# Patient Record
Sex: MALE | Race: BLACK OR AFRICAN AMERICAN | NOT HISPANIC OR LATINO | URBAN - METROPOLITAN AREA
[De-identification: names, ages, dates, MRNs, and addresses within clinical notes are randomized per-mention and may not be internally consistent; named-entity substitution may affect disease eponyms.]

---

## 2021-10-26 ENCOUNTER — INPATIENT (INPATIENT)
Facility: HOSPITAL | Age: 66
LOS: 7 days | Discharge: HOME HEALTH | End: 2021-11-03
Attending: INTERNAL MEDICINE | Admitting: INTERNAL MEDICINE
Payer: MEDICARE

## 2021-10-26 VITALS
DIASTOLIC BLOOD PRESSURE: 82 MMHG | OXYGEN SATURATION: 100 % | SYSTOLIC BLOOD PRESSURE: 197 MMHG | TEMPERATURE: 98 F | WEIGHT: 199.96 LBS | HEART RATE: 83 BPM | RESPIRATION RATE: 16 BRPM | HEIGHT: 70 IN

## 2021-10-26 LAB
ALBUMIN SERPL ELPH-MCNC: 2.5 G/DL — LOW (ref 3.3–5)
ALP SERPL-CCNC: 92 U/L — SIGNIFICANT CHANGE UP (ref 40–120)
ALT FLD-CCNC: 7 U/L — LOW (ref 12–78)
ANION GAP SERPL CALC-SCNC: 19 MMOL/L — HIGH (ref 5–17)
AST SERPL-CCNC: 18 U/L — SIGNIFICANT CHANGE UP (ref 15–37)
BASOPHILS # BLD AUTO: 0.06 K/UL — SIGNIFICANT CHANGE UP (ref 0–0.2)
BASOPHILS NFR BLD AUTO: 0.9 % — SIGNIFICANT CHANGE UP (ref 0–2)
BILIRUB SERPL-MCNC: 0.3 MG/DL — SIGNIFICANT CHANGE UP (ref 0.2–1.2)
BUN SERPL-MCNC: 88 MG/DL — HIGH (ref 7–23)
CALCIUM SERPL-MCNC: 12.1 MG/DL — HIGH (ref 8.5–10.1)
CHLORIDE SERPL-SCNC: 98 MMOL/L — SIGNIFICANT CHANGE UP (ref 96–108)
CO2 SERPL-SCNC: 14 MMOL/L — LOW (ref 22–31)
CREAT SERPL-MCNC: 9.72 MG/DL — HIGH (ref 0.5–1.3)
EOSINOPHIL # BLD AUTO: 0.35 K/UL — SIGNIFICANT CHANGE UP (ref 0–0.5)
EOSINOPHIL NFR BLD AUTO: 5 % — SIGNIFICANT CHANGE UP (ref 0–6)
GLUCOSE SERPL-MCNC: 108 MG/DL — HIGH (ref 70–99)
HCT VFR BLD CALC: 24.1 % — LOW (ref 39–50)
HGB BLD-MCNC: 7.5 G/DL — LOW (ref 13–17)
IMM GRANULOCYTES NFR BLD AUTO: 0.4 % — SIGNIFICANT CHANGE UP (ref 0–1.5)
LACTATE SERPL-SCNC: 3.5 MMOL/L — HIGH (ref 0.7–2)
LYMPHOCYTES # BLD AUTO: 0.9 K/UL — LOW (ref 1–3.3)
LYMPHOCYTES # BLD AUTO: 12.9 % — LOW (ref 13–44)
MAGNESIUM SERPL-MCNC: 2.4 MG/DL — SIGNIFICANT CHANGE UP (ref 1.6–2.6)
MCHC RBC-ENTMCNC: 26 PG — LOW (ref 27–34)
MCHC RBC-ENTMCNC: 31.1 GM/DL — LOW (ref 32–36)
MCV RBC AUTO: 83.4 FL — SIGNIFICANT CHANGE UP (ref 80–100)
MONOCYTES # BLD AUTO: 0.43 K/UL — SIGNIFICANT CHANGE UP (ref 0–0.9)
MONOCYTES NFR BLD AUTO: 6.2 % — SIGNIFICANT CHANGE UP (ref 2–14)
NEUTROPHILS # BLD AUTO: 5.21 K/UL — SIGNIFICANT CHANGE UP (ref 1.8–7.4)
NEUTROPHILS NFR BLD AUTO: 74.6 % — SIGNIFICANT CHANGE UP (ref 43–77)
NRBC # BLD: 0 /100 WBCS — SIGNIFICANT CHANGE UP (ref 0–0)
PLATELET # BLD AUTO: 527 K/UL — HIGH (ref 150–400)
POTASSIUM SERPL-MCNC: 4.8 MMOL/L — SIGNIFICANT CHANGE UP (ref 3.5–5.3)
POTASSIUM SERPL-SCNC: 4.8 MMOL/L — SIGNIFICANT CHANGE UP (ref 3.5–5.3)
PROT SERPL-MCNC: 8.1 GM/DL — SIGNIFICANT CHANGE UP (ref 6–8.3)
RBC # BLD: 2.89 M/UL — LOW (ref 4.2–5.8)
RBC # FLD: 17.2 % — HIGH (ref 10.3–14.5)
SODIUM SERPL-SCNC: 131 MMOL/L — LOW (ref 135–145)
WBC # BLD: 6.98 K/UL — SIGNIFICANT CHANGE UP (ref 3.8–10.5)
WBC # FLD AUTO: 6.98 K/UL — SIGNIFICANT CHANGE UP (ref 3.8–10.5)

## 2021-10-26 PROCEDURE — 99285 EMERGENCY DEPT VISIT HI MDM: CPT

## 2021-10-26 RX ORDER — ACETAMINOPHEN 500 MG
975 TABLET ORAL ONCE
Refills: 0 | Status: COMPLETED | OUTPATIENT
Start: 2021-10-26 | End: 2021-10-26

## 2021-10-26 RX ORDER — SODIUM CHLORIDE 9 MG/ML
2000 INJECTION INTRAMUSCULAR; INTRAVENOUS; SUBCUTANEOUS ONCE
Refills: 0 | Status: COMPLETED | OUTPATIENT
Start: 2021-10-26 | End: 2021-10-26

## 2021-10-26 RX ORDER — HYDRALAZINE HCL 50 MG
10 TABLET ORAL ONCE
Refills: 0 | Status: COMPLETED | OUTPATIENT
Start: 2021-10-26 | End: 2021-10-26

## 2021-10-26 RX ADMIN — SODIUM CHLORIDE 2000 MILLILITER(S): 9 INJECTION INTRAMUSCULAR; INTRAVENOUS; SUBCUTANEOUS at 23:34

## 2021-10-26 RX ADMIN — Medication 10 MILLIGRAM(S): at 23:33

## 2021-10-26 NOTE — ED ADULT NURSE NOTE - OBJECTIVE STATEMENT
Patient received at bed E. Patient A&Ox4. Patient in no acute distress. Patient denies pain and discomfort, patient reports that he Patient received at bed E. Patient A&Ox4. Patient in no acute distress. Patient denies pain and discomfort, patient reports that he w Patient received at bed E. Patient A&Ox4. Patient in no acute distress. Patient denies pain and discomfort, patient reports that he had blood work done this week and was sent to the PMD. Patient reports that the PMD is concerned about GWEN, patient reports that his stream is diminished, but denies burning urination. Patient denies abdominal pain, n/v/d, headache and dizziness. Patient denies chest pain and sob, patient in no acute distress. Patient received at bed E. Patient A&Ox4. Patient in no acute distress. Patient denies pain and discomfort, patient reports that he had blood work done this week and was sent to the PMD. Patient reports that the PMD is concerned about GWEN, patient reports that his stream is diminished, but denies burning urination. Patient denies abdominal pain, n/v/d, headache and dizziness. Patient denies chest pain and sob, patient in no acute distress. Patient has swelling of the right hand and foot with open wound to the right great toe- with green and whitish pus coming out. Patient denies PMH and taking medications daily.

## 2021-10-26 NOTE — ED ADULT TRIAGE NOTE - CHIEF COMPLAINT QUOTE
BIBA as per EMS pt went to urgent care on saturday for R foot and R hand infection, notified today regarding abnormal blood work indicating early kidney failure. Reports no decrease in urine output, denies dysuria.

## 2021-10-27 DIAGNOSIS — N28.89 OTHER SPECIFIED DISORDERS OF KIDNEY AND URETER: ICD-10-CM

## 2021-10-27 DIAGNOSIS — M06.9 RHEUMATOID ARTHRITIS, UNSPECIFIED: ICD-10-CM

## 2021-10-27 DIAGNOSIS — N17.9 ACUTE KIDNEY FAILURE, UNSPECIFIED: ICD-10-CM

## 2021-10-27 LAB
ALBUMIN SERPL ELPH-MCNC: 2.1 G/DL — LOW (ref 3.3–5)
ALP SERPL-CCNC: 75 U/L — SIGNIFICANT CHANGE UP (ref 40–120)
ALT FLD-CCNC: 8 U/L — LOW (ref 12–78)
ANION GAP SERPL CALC-SCNC: 18 MMOL/L — HIGH (ref 5–17)
APPEARANCE UR: CLEAR — SIGNIFICANT CHANGE UP
AST SERPL-CCNC: 15 U/L — SIGNIFICANT CHANGE UP (ref 15–37)
BACTERIA # UR AUTO: ABNORMAL
BILIRUB SERPL-MCNC: 0.3 MG/DL — SIGNIFICANT CHANGE UP (ref 0.2–1.2)
BILIRUB UR-MCNC: NEGATIVE — SIGNIFICANT CHANGE UP
BLD GP AB SCN SERPL QL: SIGNIFICANT CHANGE UP
BUN SERPL-MCNC: 86 MG/DL — HIGH (ref 7–23)
CALCIUM SERPL-MCNC: 10.9 MG/DL — HIGH (ref 8.5–10.1)
CHLORIDE SERPL-SCNC: 101 MMOL/L — SIGNIFICANT CHANGE UP (ref 96–108)
CHOLEST SERPL-MCNC: 117 MG/DL — SIGNIFICANT CHANGE UP
CO2 SERPL-SCNC: 13 MMOL/L — LOW (ref 22–31)
COLOR SPEC: YELLOW — SIGNIFICANT CHANGE UP
CREAT SERPL-MCNC: 9.11 MG/DL — HIGH (ref 0.5–1.3)
CRP SERPL-MCNC: 75 MG/L — HIGH
DIFF PNL FLD: ABNORMAL
EPI CELLS # UR: SIGNIFICANT CHANGE UP
ERYTHROCYTE [SEDIMENTATION RATE] IN BLOOD: 91 MM/HR — HIGH (ref 0–20)
GLUCOSE SERPL-MCNC: 70 MG/DL — SIGNIFICANT CHANGE UP (ref 70–99)
GLUCOSE UR QL: NEGATIVE MG/DL — SIGNIFICANT CHANGE UP
GRAM STN FLD: SIGNIFICANT CHANGE UP
HDLC SERPL-MCNC: 28 MG/DL — LOW
KETONES UR-MCNC: NEGATIVE — SIGNIFICANT CHANGE UP
LACTATE SERPL-SCNC: 2 MMOL/L — SIGNIFICANT CHANGE UP (ref 0.7–2)
LEUKOCYTE ESTERASE UR-ACNC: NEGATIVE — SIGNIFICANT CHANGE UP
LIPID PNL WITH DIRECT LDL SERPL: 72 MG/DL — SIGNIFICANT CHANGE UP
NITRITE UR-MCNC: NEGATIVE — SIGNIFICANT CHANGE UP
NON HDL CHOLESTEROL: 88 MG/DL — SIGNIFICANT CHANGE UP
PH UR: 6 — SIGNIFICANT CHANGE UP (ref 5–8)
POTASSIUM SERPL-MCNC: 5 MMOL/L — SIGNIFICANT CHANGE UP (ref 3.5–5.3)
POTASSIUM SERPL-SCNC: 5 MMOL/L — SIGNIFICANT CHANGE UP (ref 3.5–5.3)
PROT SERPL-MCNC: 6.7 GM/DL — SIGNIFICANT CHANGE UP (ref 6–8.3)
PROT UR-MCNC: 30 MG/DL
RAPID RVP RESULT: SIGNIFICANT CHANGE UP
RBC CASTS # UR COMP ASSIST: SIGNIFICANT CHANGE UP /HPF (ref 0–4)
RHEUMATOID FACT SERPL-ACNC: <10 IU/ML — SIGNIFICANT CHANGE UP (ref 0–13)
SARS-COV-2 RNA SPEC QL NAA+PROBE: SIGNIFICANT CHANGE UP
SODIUM SERPL-SCNC: 132 MMOL/L — LOW (ref 135–145)
SP GR SPEC: 1.01 — SIGNIFICANT CHANGE UP (ref 1.01–1.02)
SPECIMEN SOURCE: SIGNIFICANT CHANGE UP
TRIGL SERPL-MCNC: 81 MG/DL — SIGNIFICANT CHANGE UP
URATE SERPL-MCNC: 11.4 MG/DL — HIGH (ref 3.4–8.8)
URATE SERPL-MCNC: 11.4 MG/DL — HIGH (ref 3.4–8.8)
UROBILINOGEN FLD QL: NEGATIVE MG/DL — SIGNIFICANT CHANGE UP
WBC UR QL: SIGNIFICANT CHANGE UP

## 2021-10-27 PROCEDURE — 76775 US EXAM ABDO BACK WALL LIM: CPT | Mod: 26

## 2021-10-27 PROCEDURE — 99222 1ST HOSP IP/OBS MODERATE 55: CPT

## 2021-10-27 PROCEDURE — 12345: CPT | Mod: NC

## 2021-10-27 PROCEDURE — 73130 X-RAY EXAM OF HAND: CPT | Mod: 26,RT

## 2021-10-27 PROCEDURE — 88304 TISSUE EXAM BY PATHOLOGIST: CPT | Mod: 26

## 2021-10-27 PROCEDURE — 73630 X-RAY EXAM OF FOOT: CPT | Mod: 26,RT

## 2021-10-27 PROCEDURE — 73718 MRI LOWER EXTREMITY W/O DYE: CPT | Mod: 26,RT

## 2021-10-27 RX ORDER — HYDRALAZINE HCL 50 MG
10 TABLET ORAL ONCE
Refills: 0 | Status: COMPLETED | OUTPATIENT
Start: 2021-10-27 | End: 2021-10-27

## 2021-10-27 RX ORDER — HEPARIN SODIUM 5000 [USP'U]/ML
5000 INJECTION INTRAVENOUS; SUBCUTANEOUS EVERY 8 HOURS
Refills: 0 | Status: DISCONTINUED | OUTPATIENT
Start: 2021-10-27 | End: 2021-10-28

## 2021-10-27 RX ORDER — INFLUENZA VIRUS VACCINE 15; 15; 15; 15 UG/.5ML; UG/.5ML; UG/.5ML; UG/.5ML
0.5 SUSPENSION INTRAMUSCULAR ONCE
Refills: 0 | Status: DISCONTINUED | OUTPATIENT
Start: 2021-10-27 | End: 2021-11-03

## 2021-10-27 RX ORDER — TRAMADOL HYDROCHLORIDE 50 MG/1
50 TABLET ORAL EVERY 6 HOURS
Refills: 0 | Status: DISCONTINUED | OUTPATIENT
Start: 2021-10-27 | End: 2021-10-28

## 2021-10-27 RX ORDER — CEFEPIME 1 G/1
500 INJECTION, POWDER, FOR SOLUTION INTRAMUSCULAR; INTRAVENOUS DAILY
Refills: 0 | Status: DISCONTINUED | OUTPATIENT
Start: 2021-10-27 | End: 2021-10-29

## 2021-10-27 RX ORDER — VANCOMYCIN HCL 1 G
1000 VIAL (EA) INTRAVENOUS ONCE
Refills: 0 | Status: COMPLETED | OUTPATIENT
Start: 2021-10-27 | End: 2021-10-27

## 2021-10-27 RX ORDER — SODIUM CHLORIDE 9 MG/ML
1000 INJECTION, SOLUTION INTRAVENOUS
Refills: 0 | Status: COMPLETED | OUTPATIENT
Start: 2021-10-27 | End: 2021-10-27

## 2021-10-27 RX ORDER — PIPERACILLIN AND TAZOBACTAM 4; .5 G/20ML; G/20ML
3.38 INJECTION, POWDER, LYOPHILIZED, FOR SOLUTION INTRAVENOUS ONCE
Refills: 0 | Status: COMPLETED | OUTPATIENT
Start: 2021-10-27 | End: 2021-10-27

## 2021-10-27 RX ADMIN — Medication 10 MILLIGRAM(S): at 05:09

## 2021-10-27 RX ADMIN — PIPERACILLIN AND TAZOBACTAM 3.38 GRAM(S): 4; .5 INJECTION, POWDER, LYOPHILIZED, FOR SOLUTION INTRAVENOUS at 05:39

## 2021-10-27 RX ADMIN — HEPARIN SODIUM 5000 UNIT(S): 5000 INJECTION INTRAVENOUS; SUBCUTANEOUS at 21:34

## 2021-10-27 RX ADMIN — PIPERACILLIN AND TAZOBACTAM 200 GRAM(S): 4; .5 INJECTION, POWDER, LYOPHILIZED, FOR SOLUTION INTRAVENOUS at 05:09

## 2021-10-27 RX ADMIN — Medication 250 MILLIGRAM(S): at 12:50

## 2021-10-27 RX ADMIN — CEFEPIME 100 MILLIGRAM(S): 1 INJECTION, POWDER, FOR SOLUTION INTRAMUSCULAR; INTRAVENOUS at 16:01

## 2021-10-27 RX ADMIN — TRAMADOL HYDROCHLORIDE 50 MILLIGRAM(S): 50 TABLET ORAL at 12:21

## 2021-10-27 RX ADMIN — SODIUM CHLORIDE 100 MILLILITER(S): 9 INJECTION, SOLUTION INTRAVENOUS at 07:53

## 2021-10-27 RX ADMIN — SODIUM CHLORIDE 100 MILLILITER(S): 9 INJECTION, SOLUTION INTRAVENOUS at 19:54

## 2021-10-27 NOTE — H&P ADULT - ASSESSMENT
Patient is a 66M with no reported PMH who was sent to the ED for abnormal labs.  Patient went to an urgent care as he was concerned of an infection to his R hand and R foot.  Was given PO antibiotics there, was later called and instructed to goto the emergency room for elevated creatinine.  Denies history of known kidney disease, states that he urinates multiple times per day.  Patient reports chronic deformity and swelling of his fingers, has not seen a physician for his condition.  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  Lives alone.  NKDA, does not take daily medications.  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  Patient has no other complaints.  Hypertensive in ED to 197/82, labs show significant creatinine elevation.  Will admit to med surg.

## 2021-10-27 NOTE — ED PROVIDER NOTE - PHYSICAL EXAMINATION
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

## 2021-10-27 NOTE — H&P ADULT - PROBLEM SELECTOR PLAN 1
patient likely has significant renal failure due to low BUN:Cr ratio, signs of AOCD, elevated calcium.  Will hydrate, trend labs.  Renal eval in  - Kristine

## 2021-10-27 NOTE — ED PROVIDER NOTE - CARE PLAN
1 Principal Discharge DX:	Acute renal failure  Secondary Diagnosis:	Tophaceous gout  Secondary Diagnosis:	Cellulitis of hand, right  Secondary Diagnosis:	Cellulitis of right foot   Principal Discharge DX:	Acute renal failure  Secondary Diagnosis:	Tophaceous gout  Secondary Diagnosis:	Cellulitis of hand, right  Secondary Diagnosis:	Cellulitis of right foot  Secondary Diagnosis:	Hypertensive emergency

## 2021-10-27 NOTE — CONSULT NOTE ADULT - SUBJECTIVE AND OBJECTIVE BOX
Patient chart reviewed, full consult to follow.     Thank you for the courtesy of this consultation. [facility] NEPHROLOGY [facility], [facility]  NEPHROLOGY AND HYPERTENSION  [address]  [address]  [place] [address]  [phone]    MD ELLI WHITTAKER MD ANDREY GONCHARUK, MD MADHU KORRAPATI, MD YELENA ROSENBERG, MD BINNY KOSHY, MD CHRISTOPHER CAPUTO, MD EDWARD BOVER, MD      Information from chart:  "Patient is a 66y old  Male who presents with a chief complaint of renal failure, hand/foot swelling (27 Oct 2021 18:41)    HPI:  Patient is a 66M with no reported PMH who was sent to the ED for abnormal labs.  Patient went to an urgent care as he was concerned of an infection to his R hand and R foot.  Was given PO antibiotics there, was later called and instructed to goto the emergency room for elevated creatinine.  Denies history of known kidney disease, states that he urinates multiple times per day.  Patient reports chronic deformity and swelling of his fingers, has not seen a physician for his condition.  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  NKDA, does not take daily medications.  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  Patient has no other complaints.  Hypertensive in ED to 197/82, labs show significant creatinine elevation.  Will admit to med surg.  (27 Oct 2021 05:01)   "    Patient has not been under the care of a physician for 6 years; no recent blood work  Noted Tophi in bilateral hands  Cr 9;   No over constitutional sx   No cough; hemoptysis   Noted hypercalcemia and hyperuricemia     PAST MEDICAL & SURGICAL HISTORY:  No pertinent past medical history      FAMILY HISTORY:    Allergies    No Known Allergies    Intolerances      Home Medications:    MEDICATIONS  (STANDING):  cefepime   IVPB 500 milliGRAM(s) IV Intermittent daily  heparin   Injectable 5000 Unit(s) SubCutaneous every 8 hours  influenza   Vaccine 0.5 milliLiter(s) IntraMuscular once    MEDICATIONS  (PRN):  traMADol 50 milliGRAM(s) Oral every 6 hours PRN Moderate Pain (4 - 6)    Vital Signs Last 24 Hrs  T(C): 36.7 (27 Oct 2021 17:24), Max: 36.7 (27 Oct 2021 09:58)  T(F): 98 (27 Oct 2021 17:24), Max: 98 (27 Oct 2021 09:58)  HR: 67 (27 Oct 2021 17:24) (67 - 79)  BP: 120/73 (27 Oct 2021 17:24) (120/73 - 160/76)  BP(mean): --  RR: 17 (27 Oct 2021 17:24) (17 - 18)  SpO2: 100% (27 Oct 2021 17:24) (99% - 100%)    Daily     Daily     10-27-21 @ 07:01  -  10-27-21 @ 22:44  --------------------------------------------------------  IN: 720 mL / OUT: 0 mL / NET: 720 mL      CAPILLARY BLOOD GLUCOSE        PHYSICAL EXAM:      T(C): 36.7 (10-27-21 @ 17:24), Max: 36.7 (10-27-21 @ 09:58)  HR: 67 (10-27-21 @ 17:24) (67 - 79)  BP: 120/73 (10-27-21 @ 17:24) (120/73 - 160/76)  RR: 17 (10-27-21 @ 17:24) (17 - 18)  SpO2: 100% (10-27-21 @ 17:24) (99% - 100%)  Wt(kg): --  Lungs clear  Heart S1S2  Abd soft NT ND  Extremities:   tr edema              10-27    132<L>  |  101  |  86<H>  ----------------------------<  70  5.0   |  13<L>  |  9.11<H>    Ca    10.9<H>      27 Oct 2021 05:27  Mg     2.4     10-26    TPro  6.7  /  Alb  2.1<L>  /  TBili  0.3  /  DBili  x   /  AST  15  /  ALT  8<L>  /  AlkPhos  75  10-27                          7.5    6.98  )-----------( 527      ( 26 Oct 2021 22:16 )             24.1     Creatinine Trend: 9.11<--, 9.72<--  Urinalysis Basic - ( 27 Oct 2021 03:00 )    Color: Yellow / Appearance: Clear / S.010 / pH: x  Gluc: x / Ketone: Negative  / Bili: Negative / Urobili: Negative mg/dL   Blood: x / Protein: 30 mg/dL / Nitrite: Negative   Leuk Esterase: Negative / RBC: 0-2 /HPF / WBC 3-5   Sq Epi: x / Non Sq Epi: Few / Bacteria: Occasional            Assessment   GWEN degree of CKD unclear, associated with tophaceous gout, anemia and hypercalcemia   r/o Urate nephropathy; paraprotein related disease; occult GN    Plan  Paraprotein screen; serologies;   Pb level  Pending course, will arrange for renal bx Friday;   IVF challenge;   Allopurinol   Rheum evaluation     Marcel Carmona MD

## 2021-10-27 NOTE — H&P ADULT - NSHPLABSRESULTS_GEN_ALL_CORE
Recent Vitals  T(C): 36.6 (10-26-21 @ 21:35), Max: 36.6 (10-26-21 @ 21:35)  HR: 83 (10-26-21 @ 21:35) (83 - 83)  BP: 197/82 (10-26-21 @ 21:35) (197/82 - 197/82)  RR: 16 (10-26-21 @ 21:35) (16 - 16)  SpO2: 100% (10-26-21 @ 21:35) (100% - 100%)                        7.5    6.98  )-----------( 527      ( 26 Oct 2021 22:16 )             24.1     10-26    131<L>  |  98  |  88<H>  ----------------------------<  108<H>  4.8   |  14<L>  |  9.72<H>    Ca    12.1<H>      26 Oct 2021 22:16  Mg     2.4     10-26    TPro  8.1  /  Alb  2.5<L>  /  TBili  0.3  /  DBili  x   /  AST  18  /  ALT  7<L>  /  AlkPhos  92  10-26      LIVER FUNCTIONS - ( 26 Oct 2021 22:16 )  Alb: 2.5 g/dL / Pro: 8.1 gm/dL / ALK PHOS: 92 U/L / ALT: 7 U/L / AST: 18 U/L / GGT: x           Urinalysis Basic - ( 27 Oct 2021 03:00 )    Color: Yellow / Appearance: Clear / S.010 / pH: x  Gluc: x / Ketone: Negative  / Bili: Negative / Urobili: Negative mg/dL   Blood: x / Protein: 30 mg/dL / Nitrite: Negative   Leuk Esterase: Negative / RBC: 0-2 /HPF / WBC 3-5   Sq Epi: x / Non Sq Epi: Few / Bacteria: Occasional        Home Medications:

## 2021-10-27 NOTE — H&P ADULT - NSHPREVIEWOFSYSTEMS_GEN_ALL_CORE
Constitutional: no fever, chills, night sweats  Ears: no hearing changes or ear pain,   Nose: no nasal congestion, sinus pain, or rhinorrhea  Cardio: no chest pain, orthopnea, edema, or palpitations  Resp: no dyspnea, cough, wheezing  GI: no nausea, vomiting, diarrhea, constipation, hematochezia, or melena  : no dysuria, urinary frequency, hematuria  MSK: significant pain to hands/feet  Skin: ulcerations to hands/feet   Neuro: no weakness, dizziness, lightheadedness, syncope   Heme/Lymph: no bruising or bleeding

## 2021-10-27 NOTE — CONSULT NOTE ADULT - ASSESSMENT
66M with R foot swelling and wounds   - Pt assessed and evaluated bedside in ED  - Afebrile, no leukocytosis, VSS, ESR 91, CRP pending   - Right foot X ray resident read: obliteration and fragmentation of proximal phalanx heads of digits 1-5 seen, fabi's sign to the R medial border of the first MT, cortical erosions at the base of the distal phalanx hallux, and 2nd digit - possible OM, no signs of subcutaneous emphysema   - Exam: R foot hallux wound to medial aspect of IPJ with probe to bone with fibrous wound bed, no mal odor or erythema, 3cc of tophi expressed from wound, no tracking of wound, R foot 2nd digit with dusky changes, fluctulance noted along dorsal aspect with tense bullae along dorsal aspect of digit, wounds on medial and lateral aspect of digit at the level of the IPJ with tophi, wounds probe to bone, erythema to the digit, no malodor. Left foot wound to medial IPJ of hallux with probe to subQ, no signs of infection, 2nd digit dorsal wound to dermis with no signs of infection.   - After prepping the foot in a sterile manner, 20cc of 1% lidocaine was used to administer an ankle block to the R ankle, then using a sterile #15 blade an incision was made to dorsal aspect of the 2nd digit to the level of subQ and not beyond, about 2cc of tophi expressed along with 1cc of pus with no mal odor, wound was then flushed with 100cc of NS  - Pt tolerated procedure well   - Foot dressed with betadine and DSD  - Agree with admit to medicine   - Recommend IV vancomycin and cefepime   - Recommend toradol for possible gout  - Wound culture taken and tophi sent to pathology   - Pending uric acid results   - RF MR ordered without contrast   - Podiatry plan is local wound care vs surgery pending RF MR and culture results  - Cannot rule out OM as wounds probe to bone   - Discussed with attending  66M with R foot swelling and wounds   - Pt assessed and evaluated bedside in ED  - Afebrile, no leukocytosis, VSS, ESR 91, CRP pending   - Right foot X ray resident read: obliteration and fragmentation of proximal phalanx heads of digits 1-5 seen, fabi's sign to the R medial border of the first MT, cortical erosions at the base of the distal phalanx hallux, and 2nd digit - possible OM, no signs of subcutaneous emphysema   - Exam: R foot hallux wound to medial aspect of IPJ with probe to bone with fibrous wound bed, no mal odor or erythema, 3cc of tophi expressed from wound, no tracking of wound, R foot 2nd digit with dusky changes, fluctulance noted along dorsal aspect with tense bullae along dorsal aspect of digit, wounds on medial and lateral aspect of digit at the level of the IPJ with tophi, wounds probe to bone, erythema to the digit, no malodor. Left foot wound to medial IPJ of hallux with probe to subQ, no signs of infection, 2nd digit dorsal wound to dermis with no signs of infection.   - After prepping the foot in a sterile manner, 20cc of 1% lidocaine was used to administer an ankle block to the R ankle, then using a sterile #15 blade an incision was made to dorsal aspect of the 2nd digit to the level of subQ and not beyond, about 2cc of tophi expressed along with 1cc of pus with no mal odor, wound was then flushed with 100cc of NS  - Pt tolerated procedure well   - Foot dressed with betadine and DSD  - Agree with admit to medicine   - Recommend IV vancomycin and cefepime   - Recommend toradol and colchicine in renal dose for gout   - Wound culture taken and tophi sent to pathology   - Pending uric acid results   - RF MR ordered without contrast   - Podiatry plan is local wound care vs surgery pending RF MR and culture results  - Cannot rule out OM as wounds probe to bone   - Discussed with attending

## 2021-10-27 NOTE — H&P ADULT - HISTORY OF PRESENT ILLNESS
Patient is a 66M with no reported PMH who was sent to the ED for abnormal labs.  Patient went to an urgent care as he was concerned of an infection to his R hand and R foot.  Was given PO antibiotics there, was later called and instructed to goto the emergency room for elevated creatinine.  Denies history of known kidney disease, states that he urinates multiple times per day.  Patient reports chronic deformity and swelling of his fingers, has not seen a physician for his condition.  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  NKDA, does not take daily medications.  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  Patient has no other complaints.  Hypertensive in ED to 197/82, labs show significant creatinine elevation.  Will admit to med surg.

## 2021-10-27 NOTE — ED PROVIDER NOTE - CLINICAL SUMMARY MEDICAL DECISION MAKING FREE TEXT BOX
67 yo M with cellulitis of R hand/foot, ARF, overlying tophaceous gout of hands/feet  -basic labs, ua, cx, lactate, lipid profile, uric acid, type and screen, blood cx, lactate, mag, US renal, XR R hand/foot, ekg, iv, zosyn, antbx coverage 67 yo M with cellulitis of R hand/foot, ARF, overlying tophaceous gout of hands/feet  -basic labs, ua, cx, lactate, lipid profile, uric acid, type and screen, blood cx, lactate, mag, US renal, XR R hand/foot, ekg, iv, zosyn, antbx coverage, hydralazine for pressure

## 2021-10-27 NOTE — ED PROVIDER NOTE - OBJECTIVE STATEMENT
65 yo M sent in by urgent care for elevated renal function on tests that were performed during recent visit for R hand/foot infection.  Pt. was seen in urgent care 4 days ago, given antbx and sent home.  Pt. has no urinary complaints, states he's urinating like normal.  Pt. complains of paresthesia of R hand and foot for months, decreased appetite, and increasing bony deformities of hand and feet over course of months, not diagnosed by other physicians.   ROS: negative for fever, cough, headache, chest pain, shortness of breath, abd pain, nausea, vomiting, diarrhea, rash, paresthesia, and weakness--all other systems reviewed are negative.   PMH: Denies; Meds: Denies; SH: Denies smoking/drug use, hx of alcoholism in past, no beer for months now

## 2021-10-27 NOTE — CONSULT NOTE ADULT - SUBJECTIVE AND OBJECTIVE BOX
MARLA WELLS III  MRN-[id number]        Patient is a 66y old  Male who presents with a chief complaint of renal failure, hand/foot swelling (27 Oct 2021 08:50)      HPI:  Patient is a 66M with no reported PMH who was sent to the ED for abnormal labs.  Patient went to an urgent care as he was concerned of an infection to his R hand and R foot.  Was given PO antibiotics there, was later called and instructed to goto the emergency room for elevated creatinine.  Denies history of known kidney disease, states that he urinates multiple times per day.  Patient reports chronic deformity and swelling of his fingers, has not seen a physician for his condition.  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  NKDA, does not take daily medications.  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  Patient has no other complaints.  Hypertensive in ED to 197/82, labs show significant creatinine elevation.  Will admit to med surg.  (27 Oct 2021 05:01)      ID consulted for workup and antibiotic management     PAST MEDICAL & SURGICAL HISTORY:  No pertinent past medical history        Allergies  No Known Allergies        ANTIMICROBIALS:  cefepime   IVPB 500 daily      MEDICATIONS  (STANDING):  cefepime   IVPB   100 mL/Hr IV Intermittent (10-27-21 @ 16:01)    piperacillin/tazobactam IVPB...   200 mL/Hr IV Intermittent (10-27-21 @ 05:09)    vancomycin  IVPB   250 mL/Hr IV Intermittent (10-27-21 @ 12:50)        OTHER MEDS: MEDICATIONS  (STANDING):  heparin   Injectable 5000 every 8 hours  influenza   Vaccine 0.5 once  traMADol 50 every 6 hours PRN      SOCIAL HISTORY:       FAMILY HISTORY:      REVIEW OF SYSTEMS  [  ] ROS unobtainable because:    [  ] All other systems negative except as noted below:	    Constitutional:  [ ] fever [ ] chills  [ ] weight loss  [ ] weakness  Skin:  [ ] rash [ ] phlebitis	  Eyes: [ ] icterus [ ] pain  [ ] discharge	  ENMT: [ ] sore throat  [ ] thrush [ ] ulcers [ ] exudates  Respiratory: [ ] dyspnea [ ] hemoptysis [ ] cough [ ] sputum	  Cardiovascular:  [ ] chest pain [ ] palpitations [ ] edema	  Gastrointestinal:  [ ] nausea [ ] vomiting [ ] diarrhea [ ] constipation [ ] pain	  Genitourinary:  [ ] dysuria [ ] frequency [ ] hematuria [ ] discharge [ ] flank pain  [ ] incontinence  Musculoskeletal:  [ ] myalgias [ ] arthralgias [ ] arthritis  [ ] back pain  Neurological:  [ ] headache [ ] seizures  [ ] confusion/altered mental status  Psychiatric:  [ ] anxiety [ ] depression	  Hematology/Lymphatics:  [ ] lymphadenopathy  Endocrine:  [ ] adrenal [ ] thyroid  Allergic/Immunologic:	 [ ] transplant [ ] seasonal    Vital Signs Last 24 Hrs  T(F): 98 (10-27-21 @ 17:24), Max: 98 (10-27-21 @ 09:58)    Vital Signs Last 24 Hrs  HR: 67 (10-27-21 @ 17:24) (67 - 83)  BP: 120/73 (10-27-21 @ 17:24) (120/73 - 197/82)  RR: 17 (10-27-21 @ 17:24)  SpO2: 100% (10-27-21 @ 17:24) (99% - 100%)  Wt(kg): --    PHYSICAL EXAM:  Constitutional: non-toxic, no distress  HEAD/EYES: anicteric, no conjunctival injection  ENT:  supple, no thrush  Cardiovascular:   normal S1, S2, no murmur, no edema  Respiratory:  clear BS bilaterally, no wheezes, no rales  GI:  soft, non-tender, normal bowel sounds  :  no arellano, no CVA tenderness  Musculoskeletal:  no synovitis, normal ROM  Neurologic: awake and alert, normal strength, no focal findings  Skin:  no rash, no erythema, no phlebitis  Heme/Onc: no lymphadenopathy   Psychiatric:  awake, alert, appropriate mood          WBC Count: 6.98 K/uL (10-26 @ 22:16)                            7.5    6.98  )-----------( 527      ( 26 Oct 2021 22:16 )             24.1       10-27    132<L>  |  101  |  86<H>  ----------------------------<  70  5.0   |  13<L>  |  9.11<H>    Ca    10.9<H>      27 Oct 2021 05:27  Mg     2.4     10-26    TPro  6.7  /  Alb  2.1<L>  /  TBili  0.3  /  DBili  x   /  AST  15  /  ALT  8<L>  /  AlkPhos  75  10-27      Creatinine Trend: 9.11<--, 9.72<--    Urinalysis Basic - ( 27 Oct 2021 03:00 )    Color: Yellow / Appearance: Clear / S.010 / pH: x  Gluc: x / Ketone: Negative  / Bili: Negative / Urobili: Negative mg/dL   Blood: x / Protein: 30 mg/dL / Nitrite: Negative   Leuk Esterase: Negative / RBC: 0-2 /HPF / WBC 3-5   Sq Epi: x / Non Sq Epi: Few / Bacteria: Occasional        MICROBIOLOGY:          v    Rapid RVP Result: NotDetec (10-27 @ 05:27)          C-Reactive Protein, Serum: 75 (10-27)              RADIOLOGY:      US Renal:  (27 Oct 2021 00:09)       MARLA WELLS III  MRN-[id number]        Patient is a 66y old  Male who presents with a chief complaint of renal failure, hand/foot swelling (27 Oct 2021 08:50)      HPI:  Patient is a 66M with no reported PMH who was sent to the ED for abnormal labs.  Patient went to an urgent care as he was concerned of an infection to his R hand and R foot.  Was given PO antibiotics there, was later called and instructed to goto the emergency room for elevated creatinine.  Denies history of known kidney disease, states that he urinates multiple times per day.  Patient reports chronic deformity and swelling of his fingers, has not seen a physician for his condition.  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  NKDA, does not take daily medications.  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  Patient has no other complaints.  Hypertensive in ED to 197/82, labs show significant creatinine elevation.  Will admit to med surg.  (27 Oct 2021 05:01)    agree with above. patient agrees he hasnt taken care of himself as of late and hasnt seen  dr in years. Denies fevers and chills. No trauma   ID consulted for workup and antibiotic management     PAST MEDICAL & SURGICAL HISTORY:  No pertinent past medical history        Allergies  No Known Allergies        ANTIMICROBIALS:  cefepime   IVPB 500 daily      MEDICATIONS  (STANDING):  cefepime   IVPB   100 mL/Hr IV Intermittent (10-27-21 @ 16:01)    piperacillin/tazobactam IVPB...   200 mL/Hr IV Intermittent (10-27-21 @ 05:09)    vancomycin  IVPB   250 mL/Hr IV Intermittent (10-27-21 @ 12:50)        OTHER MEDS: MEDICATIONS  (STANDING):  heparin   Injectable 5000 every 8 hours  influenza   Vaccine 0.5 once  traMADol 50 every 6 hours PRN      SOCIAL HISTORY:     nonsmoker  past ETOH use  MJ use  no recent drug use     FAMILY HISTORY:  Father had bladder cancern    REVIEW OF SYSTEMS  [  ] ROS unobtainable because:    [ X ] All other systems negative except as noted below:	    Constitutional:  [ ] fever [ ] chills  [ ] weight loss  [ ] weakness  Skin:  [X ] rash [ ] phlebitis [x] skin changes	  Eyes: [ ] icterus [ ] pain  [ ] discharge	  ENMT: [ ] sore throat  [ ] thrush [ ] ulcers [ ] exudates  Respiratory: [ ] dyspnea [ ] hemoptysis [ ] cough [ ] sputum	  Cardiovascular:  [ ] chest pain [ ] palpitations [ ] edema	  Gastrointestinal:  [ ] nausea [ ] vomiting [ ] diarrhea [ ] constipation [ ] pain	  Genitourinary:  [ ] dysuria [ ] frequency [ ] hematuria [ ] discharge [ ] flank pain  [ ] incontinence  Musculoskeletal:  [ ] myalgias [ ] arthralgias [ ] arthritis  [ ] back pain  Neurological:  [ ] headache [ ] seizures  [ ] confusion/altered mental status  Psychiatric:  [ ] anxiety [ ] depression	  Hematology/Lymphatics:  [ ] lymphadenopathy  Endocrine:  [ ] adrenal [ ] thyroid  Allergic/Immunologic:	 [ ] transplant [ ] seasonal    Vital Signs Last 24 Hrs  T(F): 98 (10-27-21 @ 17:24), Max: 98 (10-27-21 @ 09:58)    Vital Signs Last 24 Hrs  HR: 67 (10-27-21 @ 17:24) (67 - 83)  BP: 120/73 (10-27-21 @ 17:24) (120/73 - 197/82)  RR: 17 (10-27-21 @ 17:24)  SpO2: 100% (10-27-21 @ 17:24) (99% - 100%)  Wt(kg): --    PHYSICAL EXAM:  Constitutional: non-toxic, no distress  HEAD/EYES: anicteric, no conjunctival injection  ENT:  supple, no thrush, poor dentition with broken teeth and multiple carries   Cardiovascular:   normal S1, S2, no murmur, no edema  Respiratory:  clear BS bilaterally, no wheezes, no rales  GI:  soft, non-tender, normal bowel sounds  :  no arellano, no CVA tenderness  Musculoskeletal:  no synovitis, normal ROM, tophaceous gouty changes of all 10 fingers with right index finger having tophi eroding through the skin, feet wrapped after debridement   Neurologic: awake and alert, normal strength, no focal findings  Skin:  no rash, no erythema, no phlebitis  Heme/Onc: no cervical  lymphadenopathy   Psychiatric:  awake, alert, appropriate mood          WBC Count: 6.98 K/uL (10-26 @ 22:16)                            7.5    6.98  )-----------( 527      ( 26 Oct 2021 22:16 )             24.1       10-27    132<L>  |  101  |  86<H>  ----------------------------<  70  5.0   |  13<L>  |  9.11<H>    Ca    10.9<H>      27 Oct 2021 05:27  Mg     2.4     10-26    TPro  6.7  /  Alb  2.1<L>  /  TBili  0.3  /  DBili  x   /  AST  15  /  ALT  8<L>  /  AlkPhos  75  10-27      Creatinine Trend: 9.11<--, 9.72<--    Uric Acid, Serum (10.27.21 @ 10:51)    Uric Acid, Serum: 11.4 mg/dL        Urinalysis Basic - ( 27 Oct 2021 03:00 )    Color: Yellow / Appearance: Clear / S.010 / pH: x  Gluc: x / Ketone: Negative  / Bili: Negative / Urobili: Negative mg/dL   Blood: x / Protein: 30 mg/dL / Nitrite: Negative   Leuk Esterase: Negative / RBC: 0-2 /HPF / WBC 3-5   Sq Epi: x / Non Sq Epi: Few / Bacteria: Occasional        MICROBIOLOGY:  Rapid RVP Result: NotDetec (10-27 @ 05:27)      C-Reactive Protein, Serum: 75 (10-27)  Sedimentation Rate, Erythrocyte: 91 mm/hr (10.27.21 @ 05:27)          RADIOLOGY:  < from: Xray Hand 3 Views, Right (10.27.21 @ 04:05) >  IMPRESSION: Joint erosions away from the joint surfaces which may indicate advanced gout in the proper clinical setting. Clinical correlation is suggested.    < from: MR Foot No Cont, Right (10.27.21 @ 11:37) >  IMPRESSION:  Osteomyelitis of the second proximal and middle phalanges.   Diffuse hypointense periarticular foci with diffuse intra-articular and extra-articular osseous erosions, most consistent with patient's history of gout. Amyloid deposition can have a similar appearance, however, no history of long-term dialysis is present.

## 2021-10-27 NOTE — CONSULT NOTE ADULT - ASSESSMENT
------INCOMPLETE NOTE- RECOMMENDATIONS TO FOLLOW---   66M with no reported PMH who was sent to the ED for abnormal labs.   Patient found to have severe gouty changes with high uric acid levels and is in renal failure with creatinine ~9   hand xray shows severe gouty changes  MRI right foot with osteomyelitis of 2nd phalanges  unclear etiology yet of renal failure but ould be related to severe untreated gout   low term antibiotics might be difficult in an individual who hasn't taken care of himself properly in 6 years. In this case, assuming the MRI findings are infectious and not related to rheumatologic disorder, it would be ideal to undergo amputation. would need to assess ability to heal and will order [email or web address].       osteomyelitis  Chronic kidney disease  Gout     Plan:  vancomycin by level   obtain vancomycin trough 23-24 hrs after last dose and redose once level below 15   continue renally dosed cefepime  blood cultures  follow up on foot cultures   may need amputation  obtain RAVINDRA/PVR     #Kidney disease  nephro following  renal biopsy end of the week  follow up on all labs sent by nephro  avoid nephrotoxic drugs    #Gout  start on allopurinol   education on diet and which foods/food categories to avoid  consider dietician consultation     D/W Dr. Lazarus Marie,   Infectious Disease Attending  Pager [phone]  After 5pm/weekends please call [phone] for all inquiries and new consults

## 2021-10-27 NOTE — H&P ADULT - PROBLEM SELECTOR PLAN 2
will send ESR/CRP, JAMES, RF.  Ulcerations unlikely to be from cellulitis but more likely to be from extensive rheumatologic disease.  Will hold further antibiotics.    Patient would benefit from rheumatology consult.

## 2021-10-27 NOTE — CONSULT NOTE ADULT - SUBJECTIVE AND OBJECTIVE BOX
Podiatry pager #: [phone]/ [phone]    Patient is a 66y old  Male who presents with a chief complaint of renal failure, hand/foot swelling (27 Oct 2021 05:01)      HPI:  Patient is a 66M with no reported PMH who was sent to the ED for abnormal labs.  Patient went to an urgent care as he was concerned of an infection to his R hand and R foot.  Was given PO antibiotics there, was later called and instructed to goto the emergency room for elevated creatinine.  Denies history of known kidney disease, states that he urinates multiple times per day.  Patient reports chronic deformity and swelling of his fingers, has not seen a physician for his condition.  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  NKDA, does not take daily medications.  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  Patient has no other complaints.  Hypertensive in ED to 197/82, labs show significant creatinine elevation.  Will admit to med surg.  (27 Oct 2021 05:01)      PAST MEDICAL & SURGICAL HISTORY:  No pertinent past medical history        MEDICATIONS  (STANDING):  lactated ringers. 1000 milliLiter(s) (100 mL/Hr) IV Continuous <Continuous>    MEDICATIONS  (PRN):  traMADol 50 milliGRAM(s) Oral every 6 hours PRN Moderate Pain (4 - 6)      Allergies    No Known Allergies    Intolerances        VITALS:    Vital Signs Last 24 Hrs  T(C): 36.4 (27 Oct 2021 07:28), Max: 36.6 (26 Oct 2021 21:35)  T(F): 97.5 (27 Oct 2021 07:28), Max: 97.8 (26 Oct 2021 21:35)  HR: 77 (27 Oct 2021 07:28) (77 - 83)  BP: 156/75 (27 Oct 2021 07:28) (156/75 - 197/82)  BP(mean): --  RR: 18 (27 Oct 2021 07:28) (16 - 18)  SpO2: 100% (27 Oct 2021 07:28) (100% - 100%)    LABS:                          7.5    6.98  )-----------( 527      ( 26 Oct 2021 22:16 )             24.1       10-27    132<L>  |  101  |  86<H>  ----------------------------<  70  5.0   |  13<L>  |  9.11<H>    Ca    10.9<H>      27 Oct 2021 05:27  Mg     2.4     10-26    TPro  6.7  /  Alb  2.1<L>  /  TBili  0.3  /  DBili  x   /  AST  15  /  ALT  8<L>  /  AlkPhos  75  10-27      CAPILLARY BLOOD GLUCOSE              LOWER EXTREMITY PHYSICAL EXAM:    Vascular: DP/PT _/4, B/L, CFT <_ seconds B/L, Temperature gradient _, B/L.   Neuro: Epicritic sensation _ to the level of _, B/L.  Musculoskeletal/Ortho:  Skin:  Wound #1:   Location:  Size:  Depth:  Wound bed:   Drainage:   Odor:   Periwound:  Etiology:     RADIOLOGY & ADDITIONAL STUDIES:     Podiatry pager #: [phone]/ [phone]    Patient is a 66y old  Male who presents with a chief complaint of renal failure, hand/foot swelling (27 Oct 2021 05:01)      HPI:  Patient is a 66M with no reported PMH who was sent to the ED for abnormal labs.  Patient went to an urgent care as he was concerned of an infection to his R hand and R foot.  Was given PO antibiotics there, was later called and instructed to goto the emergency room for elevated creatinine.  Denies history of known kidney disease, states that he urinates multiple times per day.  Patient reports chronic deformity and swelling of his fingers, has not seen a physician for his condition.  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  NKDA, does not take daily medications.  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  Patient has no other complaints.  Hypertensive in ED to 197/82, labs show significant creatinine elevation.  Will admit to med surg.  (27 Oct 2021 05:01)      PAST MEDICAL & SURGICAL HISTORY:  No pertinent past medical history        MEDICATIONS  (STANDING):  lactated ringers. 1000 milliLiter(s) (100 mL/Hr) IV Continuous <Continuous>    MEDICATIONS  (PRN):  traMADol 50 milliGRAM(s) Oral every 6 hours PRN Moderate Pain (4 - 6)      Allergies    No Known Allergies    Intolerances        VITALS:    Vital Signs Last 24 Hrs  T(C): 36.4 (27 Oct 2021 07:28), Max: 36.6 (26 Oct 2021 21:35)  T(F): 97.5 (27 Oct 2021 07:28), Max: 97.8 (26 Oct 2021 21:35)  HR: 77 (27 Oct 2021 07:28) (77 - 83)  BP: 156/75 (27 Oct 2021 07:28) (156/75 - 197/82)  BP(mean): --  RR: 18 (27 Oct 2021 07:28) (16 - 18)  SpO2: 100% (27 Oct 2021 07:28) (100% - 100%)    LABS:                          7.5    6.98  )-----------( 527      ( 26 Oct 2021 22:16 )             24.1       10-27    132<L>  |  101  |  86<H>  ----------------------------<  70  5.0   |  13<L>  |  9.11<H>    Ca    10.9<H>      27 Oct 2021 05:27  Mg     2.4     10-26    TPro  6.7  /  Alb  2.1<L>  /  TBili  0.3  /  DBili  x   /  AST  15  /  ALT  8<L>  /  AlkPhos  75  10-27      CAPILLARY BLOOD GLUCOSE              LOWER EXTREMITY PHYSICAL EXAM:    Vascular: DP/PT 2/4, B/L, CFT <3 seconds B/L, Temperature gradient warm to cool, B/L.   Neuro: Epicritic sensation intact to the level of digits, B/L.  Musculoskeletal/Ortho: B/L HAV R>L, hammered and laterally deviated digits x10  Skin: R foot hallux wound to medial aspect of IPJ with probe to bone with fibrous wound bed, no mal odor or erythema, 3cc of tophi expressed from wound, no tracking of wound, R foot 2nd digit with dusky changes, fluctulance noted along dorsal aspect with tense bullae along dorsal aspect of digit, wounds on medial and lateral aspect of digit at the level of the IPJ with tophi, wounds probe to bone, erythema to the digit, no malodor. Left foot wound to medial IPJ of hallux with probe to subQ, no signs of infection, 2nd digit dorsal wound to dermis with no signs of infection.       RADIOLOGY & ADDITIONAL STUDIES:    Right foot X ray resident read: obliteration and fragmentation of proximal phalanx heads of digits 1-5 seen, fabi's sign to the R medial border of the first MT, cortical erosions at the base of the distal phalanx hallux, and 2nd digit - possible OM, no signs of subcutaneous emphysema

## 2021-10-28 LAB
ABO RH CONFIRMATION: SIGNIFICANT CHANGE UP
ALBUMIN SERPL ELPH-MCNC: 1.9 G/DL — LOW (ref 3.3–5)
ALP SERPL-CCNC: 72 U/L — SIGNIFICANT CHANGE UP (ref 40–120)
ALT FLD-CCNC: 7 U/L — LOW (ref 12–78)
ANION GAP SERPL CALC-SCNC: 14 MMOL/L — SIGNIFICANT CHANGE UP (ref 5–17)
AST SERPL-CCNC: 17 U/L — SIGNIFICANT CHANGE UP (ref 15–37)
BILIRUB SERPL-MCNC: 0.2 MG/DL — SIGNIFICANT CHANGE UP (ref 0.2–1.2)
BUN SERPL-MCNC: 80 MG/DL — HIGH (ref 7–23)
C3 SERPL-MCNC: 74 MG/DL — LOW (ref 81–157)
C4 SERPL-MCNC: 20 MG/DL — SIGNIFICANT CHANGE UP (ref 13–39)
CALCIUM SERPL-MCNC: 11 MG/DL — HIGH (ref 8.5–10.1)
CHLORIDE SERPL-SCNC: 102 MMOL/L — SIGNIFICANT CHANGE UP (ref 96–108)
CO2 SERPL-SCNC: 16 MMOL/L — LOW (ref 22–31)
COVID-19 SPIKE DOMAIN AB INTERP: POSITIVE
COVID-19 SPIKE DOMAIN ANTIBODY RESULT: 158 U/ML — HIGH
CREAT ?TM UR-MCNC: 46 MG/DL — SIGNIFICANT CHANGE UP
CREAT SERPL-MCNC: 9.04 MG/DL — HIGH (ref 0.5–1.3)
CULTURE RESULTS: SIGNIFICANT CHANGE UP
GLUCOSE SERPL-MCNC: 92 MG/DL — SIGNIFICANT CHANGE UP (ref 70–99)
HAV IGM SER-ACNC: SIGNIFICANT CHANGE UP
HBV CORE IGM SER-ACNC: SIGNIFICANT CHANGE UP
HBV SURFACE AG SER-ACNC: SIGNIFICANT CHANGE UP
HCT VFR BLD CALC: 20.1 % — CRITICAL LOW (ref 39–50)
HCT VFR BLD CALC: 20.9 % — CRITICAL LOW (ref 39–50)
HCV AB S/CO SERPL IA: 0.17 S/CO — SIGNIFICANT CHANGE UP (ref 0–0.99)
HCV AB SERPL-IMP: SIGNIFICANT CHANGE UP
HGB BLD-MCNC: 6.3 G/DL — CRITICAL LOW (ref 13–17)
HGB BLD-MCNC: 6.5 G/DL — CRITICAL LOW (ref 13–17)
HIV 1+2 AB+HIV1 P24 AG SERPL QL IA: SIGNIFICANT CHANGE UP
IGA FLD-MCNC: 554 MG/DL — HIGH (ref 84–499)
IGG FLD-MCNC: 1517 MG/DL — SIGNIFICANT CHANGE UP (ref 610–1660)
IGM SERPL-MCNC: 106 MG/DL — SIGNIFICANT CHANGE UP (ref 35–242)
KAPPA LC SER QL IFE: 14.71 MG/DL — HIGH (ref 0.33–1.94)
KAPPA/LAMBDA FREE LIGHT CHAIN RATIO, SERUM: 1.01 RATIO — SIGNIFICANT CHANGE UP (ref 0.26–1.65)
LAMBDA LC SER QL IFE: 14.63 MG/DL — HIGH (ref 0.57–2.63)
MCHC RBC-ENTMCNC: 26.1 PG — LOW (ref 27–34)
MCHC RBC-ENTMCNC: 26.2 PG — LOW (ref 27–34)
MCHC RBC-ENTMCNC: 31.1 GM/DL — LOW (ref 32–36)
MCHC RBC-ENTMCNC: 31.3 GM/DL — LOW (ref 32–36)
MCV RBC AUTO: 83.4 FL — SIGNIFICANT CHANGE UP (ref 80–100)
MCV RBC AUTO: 84.3 FL — SIGNIFICANT CHANGE UP (ref 80–100)
NRBC # BLD: 0 /100 WBCS — SIGNIFICANT CHANGE UP (ref 0–0)
NRBC # BLD: 0 /100 WBCS — SIGNIFICANT CHANGE UP (ref 0–0)
PLATELET # BLD AUTO: 399 K/UL — SIGNIFICANT CHANGE UP (ref 150–400)
PLATELET # BLD AUTO: 413 K/UL — HIGH (ref 150–400)
POTASSIUM SERPL-MCNC: 5.4 MMOL/L — HIGH (ref 3.5–5.3)
POTASSIUM SERPL-SCNC: 5.4 MMOL/L — HIGH (ref 3.5–5.3)
PROT ?TM UR-MCNC: 24 MG/DL — HIGH (ref 0–12)
PROT SERPL-MCNC: 6 G/DL — SIGNIFICANT CHANGE UP (ref 6–8.3)
PROT SERPL-MCNC: 6 G/DL — SIGNIFICANT CHANGE UP (ref 6–8.3)
PROT SERPL-MCNC: 6.4 GM/DL — SIGNIFICANT CHANGE UP (ref 6–8.3)
PROT/CREAT UR-RTO: 0.5 RATIO — HIGH (ref 0–0.2)
RBC # BLD: 2.41 M/UL — LOW (ref 4.2–5.8)
RBC # BLD: 2.48 M/UL — LOW (ref 4.2–5.8)
RBC # FLD: 17.5 % — HIGH (ref 10.3–14.5)
RBC # FLD: 17.8 % — HIGH (ref 10.3–14.5)
SARS-COV-2 IGG+IGM SERPL QL IA: 158 U/ML — HIGH
SARS-COV-2 IGG+IGM SERPL QL IA: POSITIVE
SODIUM SERPL-SCNC: 132 MMOL/L — LOW (ref 135–145)
SPECIMEN SOURCE: SIGNIFICANT CHANGE UP
URATE UR-MCNC: 12.5 MG/DL — SIGNIFICANT CHANGE UP
VANCOMYCIN TROUGH SERPL-MCNC: 11.2 UG/ML — SIGNIFICANT CHANGE UP (ref 10–20)
WBC # BLD: 6.36 K/UL — SIGNIFICANT CHANGE UP (ref 3.8–10.5)
WBC # BLD: 6.92 K/UL — SIGNIFICANT CHANGE UP (ref 3.8–10.5)
WBC # FLD AUTO: 6.36 K/UL — SIGNIFICANT CHANGE UP (ref 3.8–10.5)
WBC # FLD AUTO: 6.92 K/UL — SIGNIFICANT CHANGE UP (ref 3.8–10.5)

## 2021-10-28 PROCEDURE — 99233 SBSQ HOSP IP/OBS HIGH 50: CPT

## 2021-10-28 PROCEDURE — 74176 CT ABD & PELVIS W/O CONTRAST: CPT | Mod: 26

## 2021-10-28 PROCEDURE — 99232 SBSQ HOSP IP/OBS MODERATE 35: CPT

## 2021-10-28 RX ORDER — ALLOPURINOL 300 MG
100 TABLET ORAL DAILY
Refills: 0 | Status: DISCONTINUED | OUTPATIENT
Start: 2021-10-28 | End: 2021-10-31

## 2021-10-28 RX ORDER — MUPIROCIN 20 MG/G
1 OINTMENT TOPICAL ONCE
Refills: 0 | Status: COMPLETED | OUTPATIENT
Start: 2021-10-28 | End: 2021-10-28

## 2021-10-28 RX ORDER — VANCOMYCIN HCL 1 G
1250 VIAL (EA) INTRAVENOUS ONCE
Refills: 0 | Status: COMPLETED | OUTPATIENT
Start: 2021-10-28 | End: 2021-10-28

## 2021-10-28 RX ORDER — SODIUM BICARBONATE 1 MEQ/ML
650 SYRINGE (ML) INTRAVENOUS THREE TIMES A DAY
Refills: 0 | Status: DISCONTINUED | OUTPATIENT
Start: 2021-10-28 | End: 2021-11-03

## 2021-10-28 RX ORDER — AMLODIPINE BESYLATE 2.5 MG/1
5 TABLET ORAL DAILY
Refills: 0 | Status: DISCONTINUED | OUTPATIENT
Start: 2021-10-28 | End: 2021-10-28

## 2021-10-28 RX ORDER — SODIUM ZIRCONIUM CYCLOSILICATE 10 G/10G
10 POWDER, FOR SUSPENSION ORAL ONCE
Refills: 0 | Status: COMPLETED | OUTPATIENT
Start: 2021-10-28 | End: 2021-10-28

## 2021-10-28 RX ADMIN — TRAMADOL HYDROCHLORIDE 50 MILLIGRAM(S): 50 TABLET ORAL at 12:14

## 2021-10-28 RX ADMIN — SODIUM ZIRCONIUM CYCLOSILICATE 10 GRAM(S): 10 POWDER, FOR SUSPENSION ORAL at 23:15

## 2021-10-28 RX ADMIN — Medication 650 MILLIGRAM(S): at 21:15

## 2021-10-28 RX ADMIN — Medication 100 MILLIGRAM(S): at 17:31

## 2021-10-28 RX ADMIN — HEPARIN SODIUM 5000 UNIT(S): 5000 INJECTION INTRAVENOUS; SUBCUTANEOUS at 13:31

## 2021-10-28 RX ADMIN — MUPIROCIN 1 APPLICATION(S): 20 OINTMENT TOPICAL at 05:22

## 2021-10-28 RX ADMIN — TRAMADOL HYDROCHLORIDE 50 MILLIGRAM(S): 50 TABLET ORAL at 13:29

## 2021-10-28 RX ADMIN — HEPARIN SODIUM 5000 UNIT(S): 5000 INJECTION INTRAVENOUS; SUBCUTANEOUS at 05:22

## 2021-10-28 RX ADMIN — Medication 166.67 MILLIGRAM(S): at 17:30

## 2021-10-28 RX ADMIN — CEFEPIME 100 MILLIGRAM(S): 1 INJECTION, POWDER, FOR SOLUTION INTRAMUSCULAR; INTRAVENOUS at 12:15

## 2021-10-28 NOTE — PROGRESS NOTE ADULT - ASSESSMENT
66M s/p bedside I&D of R foot 2nd digit  - Pt assessed and evaluated bedside   - R foot 2nd digit wound down to bone, no additional discharge/ tophi expressed, periwound erythema, R foot cool to touch, no malodor   - toradol and colchicine in renal dose for gout   -MR showing OM of 2nd proximal phalanx and 2nd  middle phalanx  - Podiatry plan is local wound care vs surgery pending pt's decision pt to talk to son today and decide treatment plan  - Discussed with attending

## 2021-10-28 NOTE — PROGRESS NOTE ADULT - SUBJECTIVE AND OBJECTIVE BOX
NEPHROLOGY PROGRESS NOTE    CHIEF COMPLAINT:  renal failure    HPI:  Renal function about same.     ROS:  denies nausea    EXAM:  T(F): 98.7 (10-28-21 @ 11:32)  HR: 82 (10-28-21 @ 11:32)  BP: 145/58 (10-28-21 @ 11:32)  RR: 17 (10-28-21 @ 11:32)  SpO2: 100% (10-28-21 @ 11:32)    Conversant, in no apparent distress  Normal respiratory effort, lungs clear bilaterally  Heart RRR with no murmur, no peripheral edema         LABS                             6.5    6.92  )-----------( 413      ( 28 Oct 2021 11:32 )             20.9          10-28    132<L>  |  102  |  80<H>  ----------------------------<  92  5.4<H>   |  16<L>  |  9.04<H>    Ca    11.0<H>      28 Oct 2021 11:32  Mg     2.4     10-    TPro  6.4  /  Alb  1.9<L>  /  TBili  0.2  /  DBili  x   /  AST  17  /  ALT  7<L>  /  AlkPhos  72  10-28    UA 11.4      Urinalysis Basic - ( 27 Oct 2021 03:00 )  Color: Yellow / Appearance: Clear / S.010 / pH: x  Gluc: x / Ketone: Negative  / Bili: Negative / Urobili: Negative mg/dL   Blood: x / Protein: 30 mg/dL / Nitrite: Negative   Leuk Esterase: Negative / RBC: 0-2 /HPF / WBC 3-5   Sq Epi: x / Non Sq Epi: Few / Bacteria: Occasional    Urine prot/cr 0.50        Assessment   GWEN degree of CKD unclear, associated with tophaceous gout, anemia and hypercalcemia   r/o Urate nephropathy; paraprotein related disease; occult GN  Mild hyperkalemia/acidosis    Plan  Lokelma 10 grams PO x 1;  NaHCO3 650 mg PO TID  Paraprotein screen; serologies;   Pb level  Renal biopsy Friday  Allopurinol 100 mg PO QD  Rheum evaluation    NEPHROLOGY PROGRESS NOTE    CHIEF COMPLAINT:  renal failure    HPI:  Renal function about same.     ROS:  denies nausea    EXAM:  T(F): 98.7 (10-28-21 @ 11:32)  HR: 82 (10-28-21 @ 11:32)  BP: 145/58 (10-28-21 @ 11:32)  RR: 17 (10-28-21 @ 11:32)  SpO2: 100% (10-28-21 @ 11:32)    Conversant, in no apparent distress  Normal respiratory effort, lungs clear bilaterally  Heart RRR with no murmur, no peripheral edema         LABS                             6.5    6.92  )-----------( 413      ( 28 Oct 2021 11:32 )             20.9          10-28    132<L>  |  102  |  80<H>  ----------------------------<  92  5.4<H>   |  16<L>  |  9.04<H>    Ca    11.0<H>      28 Oct 2021 11:32  Mg     2.4     10-    TPro  6.4  /  Alb  1.9<L>  /  TBili  0.2  /  DBili  x   /  AST  17  /  ALT  7<L>  /  AlkPhos  72  10-28    UA 11.4      Urinalysis Basic - ( 27 Oct 2021 03:00 )  Color: Yellow / Appearance: Clear / S.010 / pH: x  Gluc: x / Ketone: Negative  / Bili: Negative / Urobili: Negative mg/dL   Blood: x / Protein: 30 mg/dL / Nitrite: Negative   Leuk Esterase: Negative / RBC: 0-2 /HPF / WBC 3-5   Sq Epi: x / Non Sq Epi: Few / Bacteria: Occasional    Urine prot/cr 0.50        Assessment   GWEN degree of CKD unclear, associated with tophaceous gout, anemia and hypercalcemia   r/o Urate nephropathy; paraprotein related disease; occult GN less likely  Mild hyperkalemia/acidosis  Hypercalcemia    Plan  Low K diet; Lokelma 10 grams PO x 1;  NaHCO3 650 mg PO TID  Paraprotein screen; serologies;   Pb level  Renal biopsy Friday  Allopurinol 100 mg PO QD  Rheum evaluation

## 2021-10-28 NOTE — PROGRESS NOTE ADULT - ASSESSMENT
66M with no reported PMH who was sent to the ED for abnormal labs.   Patient found to have severe gouty changes with high uric acid levels and is in renal failure with creatinine ~9   hand xray shows severe gouty changes  MRI right foot with osteomyelitis of 2nd phalanges  unclear etiology yet of renal failure but ould be related to severe untreated gout   low term antibiotics might be difficult in an individual who hasn't taken care of himself properly in 6 years. In this case, assuming the MRI findings are infectious and not related to rheumatologic disorder, it would be ideal to undergo amputation. would need to assess ability to heal and will order [email or web address].     10/28: MRI with Osteomyelitis of the right second proximal and middle phalanges, pt is thinking about his options, would like to talk to his son. The pt is afebrile, on RA, no leukocytosis, anemic, wound culture is growing Klebsiella, Vancomycin trough is 11.2, cefepime renally dosed continued.       osteomyelitis  Chronic kidney disease  Gout       Plan:  vancomycin by level 11.2  obtain vancomycin trough 23-24 hrs after last dose and re-dose once level below 15   continue renally dosed cefepime  blood cultures NGTD  follow up on foot cultures - preliminary with Klebsiella   may need amputation - pending pt's decision   obtain RAVINDRA/PVR     #Kidney disease  nephro following  renal biopsy end of the week  follow up on all labs sent by nephro  avoid nephrotoxic drugs    #Gout  allopurinol   education on diet and which foods/food categories to avoid  consider dietician consultation     Discussed with podiatry   Discussed with Dr. Qiu  66M with no reported PMH who was sent to the ED for abnormal labs.   Patient found to have severe gouty changes with high uric acid levels and is in renal failure with creatinine ~9   hand xray shows severe gouty changes  MRI right foot with osteomyelitis of 2nd phalanges  unclear etiology yet of renal failure but ould be related to severe untreated gout   low term antibiotics might be difficult in an individual who hasn't taken care of himself properly in 6 years. In this case, assuming the MRI findings are infectious and not related to rheumatologic disorder, it would be ideal to undergo amputation. would need to assess ability to heal and will order [email or web address].     10/28: MRI with Osteomyelitis of the right second proximal and middle phalanges, pt is thinking about his options, would like to talk to his son. The pt is afebrile, on RA, no leukocytosis, anemic, wound culture is growing Klebsiella, Vancomycin trough is 11.2, give one time dose of Vancomycin 1250 mg IV now, will repeat vancomycin trough in 23-24 hrs, cefepime renally dosed continued.       osteomyelitis  Chronic kidney disease  Gout       Plan:  vancomycin by level 11.2  give one time dose of Vancomycin 1250 mg IV STAT - ordered   obtain vancomycin trough 23-24 hrs after last dose and re-dose once level below 15 - ordered   continue renally dosed cefepime  blood cultures NGTD  follow up on foot cultures - preliminary with Klebsiella   may need amputation - pending pt's decision   obtain RAVINDRA/PVR     #Kidney disease  nephro following  renal biopsy end of the week  follow up on all labs sent by nephro  avoid nephrotoxic drugs    #Gout  allopurinol   education on diet and which foods/food categories to avoid  consider dietician consultation     Discussed with podiatry   Discussed with Dr. Qiu  Discussed with Dr. Ramey (pharmacy)  Discussed with Dr. Marie  66M with no reported PMH who was sent to the ED for abnormal labs.   Patient found to have severe gouty changes with high uric acid levels and is in renal failure with creatinine ~9   hand xray shows severe gouty changes  MRI right foot with osteomyelitis of 2nd phalanges  unclear etiology yet of renal failure but ould be related to severe untreated gout   low term antibiotics might be difficult in an individual who hasn't taken care of himself properly in 6 years. In this case, assuming the MRI findings are infectious and not related to rheumatologic disorder, it would be ideal to undergo amputation. would need to assess ability to heal and will order [email or web address].     10/28: MRI with Osteomyelitis of the right second proximal and middle phalanges, pt is thinking about his options, would like to talk to his son. The pt is afebrile, on RA, no leukocytosis, anemic, wound culture is growing Klebsiella, Vancomycin trough is 11.2, give one time dose of Vancomycin 1250 mg IV now, will repeat vancomycin trough in 23-24 hrs, cefepime renally dosed continued.       osteomyelitis  Chronic kidney disease  Gout   therapeutic drug monitoring required with IV vancomycin      Plan:  vancomycin by level 11.2  give one time dose of Vancomycin 1250 mg IV STAT - ordered   obtain vancomycin trough 23-24 hrs after last dose and re-dose once level below 15 - ordered   continue renally dosed cefepime  blood cultures NGTD  follow up on foot cultures - preliminary with Klebsiella   may need amputation - pending pt's decision   obtain RAVINDRA/PVR     #Kidney disease  nephro following  renal biopsy end of the week  follow up on all labs sent by nephro  avoid nephrotoxic drugs    #Gout  allopurinol   education on diet and which foods/food categories to avoid  consider dietician consultation     Discussed with podiatry   Discussed with Dr. Qiu  Discussed with Dr. Ramey (pharmacy)  Discussed with Dr. Marie

## 2021-10-28 NOTE — PROGRESS NOTE ADULT - SUBJECTIVE AND OBJECTIVE BOX
MARLA WELLS III  MRN-[id number]    Interval History: The pt was seen and examined earlier, no distress, no new complains. The pt is afebrile, on RA, no new lab work.     PAST MEDICAL & SURGICAL HISTORY:  No pertinent past medical history        ROS:    [ ] Unobtainable because:  [x ] All other systems negative    Constitutional: no fever, no chills  Head: no trauma  Eyes: no vision changes, no eye pain  ENT:  no sore throat, no rhinorrhea  Cardiovascular:  no chest pain, no palpitation  Respiratory:  no SOB, no cough  GI:  no abd pain, no vomiting, no diarrhea  urinary: no dysuria, no hematuria, no flank pain  musculoskeletal:  no joint pain, no joint swelling  skin:  no rash  neurology:  no headache, no seizure, no change in mental status  psych: no anxiety, no depression         Allergies  No Known Allergies        ANTIMICROBIALS:  cefepime   IVPB 500 daily      OTHER MEDS:  allopurinol 100 milliGRAM(s) Oral daily  influenza   Vaccine 0.5 milliLiter(s) IntraMuscular once  sodium bicarbonate 650 milliGRAM(s) Oral three times a day  sodium zirconium cyclosilicate 10 Gram(s) Oral once  traMADol 50 milliGRAM(s) Oral every 6 hours PRN      Vital Signs Last 24 Hrs  T(C): 37.1 (28 Oct 2021 11:32), Max: 37.1 (28 Oct 2021 11:32)  T(F): 98.7 (28 Oct 2021 11:32), Max: 98.7 (28 Oct 2021 11:32)  HR: 82 (28 Oct 2021 11:32) (67 - 82)  BP: 145/58 (28 Oct 2021 11:32) (120/73 - 153/74)  BP(mean): --  RR: 17 (28 Oct 2021 11:32) (17 - 18)  SpO2: 100% (28 Oct 2021 11:32) (99% - 100%)    Physical Exam:  Constitutional: non-toxic, no distress  HEAD/EYES: anicteric, no conjunctival injection  ENT:  supple, no thrush, poor dentition with broken teeth and multiple carries   Cardiovascular:   normal S1, S2, no murmur, no edema  Respiratory:  clear BS bilaterally, no wheezes, no rales  GI:  soft, non-tender, normal bowel sounds  :  no arellano, no CVA tenderness  Musculoskeletal:  no synovitis, normal ROM, tophaceous gouty changes of all 10 fingers with right index finger having tophi eroding through the skin, right foot toes 1,2 with swelling, tender, no significant erythema, right 2nd toe surgical wound with no pus or malodor. Left foot toes with swelling - no erythema, no wound, no drainage.   Neurologic: awake and alert, normal strength, no focal findings  Skin:  no rash, no erythema, no phlebitis  Heme/Onc: no cervical  lymphadenopathy   Psychiatric:  awake, alert, appropriate mood    WBC Count: 6.92 K/uL (10-28 @ 11:32)  WBC Count: 6.98 K/uL (10-26 @ 22:16)                            6.5    6.92  )-----------( 413      ( 28 Oct 2021 11:32 )             20.9       10-28    132<L>  |  102  |  80<H>  ----------------------------<  92  5.4<H>   |  16<L>  |  9.04<H>    Ca    11.0<H>      28 Oct 2021 11:32  Mg     2.4     10-26    TPro  6.4  /  Alb  1.9<L>  /  TBili  0.2  /  DBili  x   /  AST  17  /  ALT  7<L>  /  AlkPhos  72  10-28      Urinalysis Basic - ( 27 Oct 2021 03:00 )    Color: Yellow / Appearance: Clear / S.010 / pH: x  Gluc: x / Ketone: Negative  / Bili: Negative / Urobili: Negative mg/dL   Blood: x / Protein: 30 mg/dL / Nitrite: Negative   Leuk Esterase: Negative / RBC: 0-2 /HPF / WBC 3-5   Sq Epi: x / Non Sq Epi: Few / Bacteria: Occasional        Creatinine Trend: 9.04<--, 9.11<--, 9.72<--  Lactate, Blood: 2.0 mmol/L (10-27-21 @ 01:42)  Lactate, Blood: 3.5 mmol/L (10-26-21 @ 22:16)      MICROBIOLOGY:  Vancomycin Level, Trough: 11.2 ug/mL (10-28-21 @ 11:32)  v  .Tissue R foot  10-27-21   Numerous Klebsiella oxytoca/Raoutella ornithinolytica  --    Few polymorphonuclear leukocytes  Numerous Gram Negative Rods per oil power field  Moderate Gram positive cocci in pairs per oil power field      Clean Catch Clean Catch (Midstream)  10-27-21   <10,000 CFU/mL Normal Urogenital Meenu  --  --      .Blood Blood  10-27-21   No growth to date.  --  --          Rapid RVP Result: NotDetec (10-27 @ 05:27)        C-Reactive Protein, Serum: 75 (10-27)            SARS-CoV-2: NotDetec (27 Oct 2021 05:27)    RADIOLOGY:  < from: MR Foot No Cont, Right (10.27.21 @ 11:37) >  EXAM:  MR FOOT RT                            PROCEDURE DATE:  10/27/2021          INTERPRETATION:  RIGHT FOOT MRI    CLINICAL INFORMATION: Foot wound extending to the bone. Gout.  TECHNIQUE: Multiplanar, multisequence MRI was obtained of the right foot.    FINDINGS:    There are extensive periarticular infiltrating T1/STIR hypointense foci is ultimately in diffuse erosive changes. Findings are most consistent with patient's history of gout. There is replacement of the T1 marrow signal with associated edema of the mid/distal aspect of the proximal second phalanx and of the second middle phalanx, consistent with osteomyelitis. Faint nonspecific marrow edema with preservation of the T1 marrow signal is present within the fifth metatarsal head,which may represent sequela of periarticular erosive changes. Remaining marrow signal is grossly maintained. There is hallux valgus. There is diffuse muscle atrophy. 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    IMPRESSION:  Osteomyelitis of the second proximal and middle phalanges.   Diffuse hypointense periarticular foci with diffuse intra-articular and extra-articular osseous erosions, most consistent with patient's history of gout. Amyloid deposition can have a similar appearance, however, no history of long-term dialysis is present.    --- End of Report ---            STEPHANIE GUILLORY MD; Attending Radiologist  This document has been electronically signed. Oct 27 2021 12:58PM    < end of copied text >     MARLA WELLS III  MRN-[id number]    Interval History: The pt was seen and examined earlier, no distress, no new complains. The pt is afebrile, on RA, waiting for his son to arrive and discuss when por if he should proceed amputation     PAST MEDICAL & SURGICAL HISTORY:  No pertinent past medical history        ROS:    [ ] Unobtainable because:  [x ] All other systems negative    Constitutional: no fever, no chills  Head: no trauma  Eyes: no vision changes, no eye pain  ENT:  no sore throat, no rhinorrhea  Cardiovascular:  no chest pain, no palpitation  Respiratory:  no SOB, no cough  GI:  no abd pain, no vomiting, no diarrhea  urinary: no dysuria, no hematuria, no flank pain  musculoskeletal:  no joint pain, no joint swelling  skin:  no rash  neurology:  no headache, no seizure, no change in mental status  psych: no anxiety, no depression         Allergies  No Known Allergies        ANTIMICROBIALS:  cefepime   IVPB 500 daily      OTHER MEDS:  allopurinol 100 milliGRAM(s) Oral daily  influenza   Vaccine 0.5 milliLiter(s) IntraMuscular once  sodium bicarbonate 650 milliGRAM(s) Oral three times a day  sodium zirconium cyclosilicate 10 Gram(s) Oral once  traMADol 50 milliGRAM(s) Oral every 6 hours PRN      Vital Signs Last 24 Hrs  T(C): 37.1 (28 Oct 2021 11:32), Max: 37.1 (28 Oct 2021 11:32)  T(F): 98.7 (28 Oct 2021 11:32), Max: 98.7 (28 Oct 2021 11:32)  HR: 82 (28 Oct 2021 11:32) (67 - 82)  BP: 145/58 (28 Oct 2021 11:32) (120/73 - 153/74)  BP(mean): --  RR: 17 (28 Oct 2021 11:32) (17 - 18)  SpO2: 100% (28 Oct 2021 11:32) (99% - 100%)    Physical Exam:  Constitutional: non-toxic, no distress  HEAD/EYES: anicteric, no conjunctival injection  ENT:  supple, no thrush, poor dentition with broken teeth and multiple carries   Cardiovascular:   normal S1, S2, no murmur, no edema  Respiratory:  clear BS bilaterally, no wheezes, no rales  GI:  soft, non-tender, normal bowel sounds  :  no arellano, no CVA tenderness  Musculoskeletal:  no synovitis, normal ROM, tophaceous gouty changes of all 10 fingers with right index finger having tophi eroding through the skin, right foot toes 1,2 with swelling, tender, no significant erythema, right 2nd toe surgical wound with no pus or malodor. Left foot toes with swelling - no erythema, no wound, no drainage.   Neurologic: awake and alert, normal strength, no focal findings  Skin:  no rash, no erythema, no phlebitis  Heme/Onc: no cervical  lymphadenopathy   Psychiatric:  awake, alert, appropriate mood    WBC Count: 6.92 K/uL (10-28 @ 11:32)  WBC Count: 6.98 K/uL (10-26 @ 22:16)                            6.5    6.92  )-----------( 413      ( 28 Oct 2021 11:32 )             20.9       10-28    132<L>  |  102  |  80<H>  ----------------------------<  92  5.4<H>   |  16<L>  |  9.04<H>    Ca    11.0<H>      28 Oct 2021 11:32  Mg     2.4     10-26    TPro  6.4  /  Alb  1.9<L>  /  TBili  0.2  /  DBili  x   /  AST  17  /  ALT  7<L>  /  AlkPhos  72  10-28      Urinalysis Basic - ( 27 Oct 2021 03:00 )    Color: Yellow / Appearance: Clear / S.010 / pH: x  Gluc: x / Ketone: Negative  / Bili: Negative / Urobili: Negative mg/dL   Blood: x / Protein: 30 mg/dL / Nitrite: Negative   Leuk Esterase: Negative / RBC: 0-2 /HPF / WBC 3-5   Sq Epi: x / Non Sq Epi: Few / Bacteria: Occasional        Creatinine Trend: 9.04<--, 9.11<--, 9.72<--  Lactate, Blood: 2.0 mmol/L (10-27-21 @ 01:42)  Lactate, Blood: 3.5 mmol/L (10-26-21 @ 22:16)      MICROBIOLOGY:  Vancomycin Level, Trough: 11.2 ug/mL (10-28-21 @ 11:32)  v  .Tissue R foot  10-27-21   Numerous Klebsiella oxytoca/Raoutella ornithinolytica  --    Few polymorphonuclear leukocytes  Numerous Gram Negative Rods per oil power field  Moderate Gram positive cocci in pairs per oil power field      Clean Catch Clean Catch (Midstream)  10-27-21   <10,000 CFU/mL Normal Urogenital Meenu  --  --      .Blood Blood  10-27-21   No growth to date.  --  --          Rapid RVP Result: NotDetec (10-27 @ 05:27)        C-Reactive Protein, Serum: 75 (10-27)        SARS-CoV-2: NotDetec (27 Oct 2021 05:27)    RADIOLOGY   MR Foot No Cont, Right (10.27.21 @ 11:37) >    FINDINGS:    There are extensive periarticular infiltrating T1/STIR hypointense foci is ultimately in diffuse erosive changes. Findings are most consistent with patient's history of gout. There is replacement of the T1 marrow signal with associated edema of the mid/distal aspect of the proximal second phalanx and of the second middle phalanx, consistent with osteomyelitis. Faint nonspecific marrow edema with preservation of the T1 marrow signal is present within the fifth metatarsal head,which may represent sequela of periarticular erosive changes. Remaining marrow signal is grossly maintained. There is hallux valgus. There is diffuse muscle atrophy. 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    IMPRESSION:  Osteomyelitis of the second proximal and middle phalanges.   Diffuse hypointense periarticular foci with diffuse intra-articular and extra-articular osseous erosions, most consistent with patient's history of gout. Amyloid deposition can have a similar appearance, however, no history of long-term dialysis is present.

## 2021-10-28 NOTE — PROVIDER CONTACT NOTE (CRITICAL VALUE NOTIFICATION) - SITUATION
NP notified that patient had low hemoglobin of 6.5 and hematocrit of 20.9. RN assessed patient and patient denied chest pain. Pt denied any dizziness. VS WDL. Will continue to monitor patient NP notified that patient had low hemoglobin of 6.5 and hematocrit of 20.9. RN assessed patient and patient denied chest pain. Pt denied any dizziness. VS WDL. NP notified that pt is aymptomatic. Will continue to monitor patient

## 2021-10-28 NOTE — PROGRESS NOTE ADULT - SUBJECTIVE AND OBJECTIVE BOX
INTERVAL HPI/OVERNIGHT EVENTS: Pt seen and examined at bedside. He is anxious bout renal bx tomorrow. No events overnight.    66y  Vital Signs Last 24 Hrs  T(C): 36.1 (28 Oct 2021 17:15), Max: 37.1 (28 Oct 2021 11:32)  T(F): 97 (28 Oct 2021 17:15), Max: 98.7 (28 Oct 2021 11:32)  HR: 84 (28 Oct 2021 17:15) (69 - 84)  BP: 172/74 (28 Oct 2021 17:15) (144/60 - 172/74)  BP(mean): --  RR: 18 (28 Oct 2021 17:15) (17 - 18)  SpO2: 100% (28 Oct 2021 17:15) (99% - 100%)  I&O's Summary    27 Oct 2021 07:01  -  28 Oct 2021 07:00  --------------------------------------------------------  IN: 720 mL / OUT: 0 mL / NET: 720 mL    28 Oct 2021 07:01  -  28 Oct 2021 18:22  --------------------------------------------------------  IN: 360 mL / OUT: 200 mL / NET: 160 mL      MEDICATIONS  (STANDING):  allopurinol 100 milliGRAM(s) Oral daily  cefepime   IVPB 500 milliGRAM(s) IV Intermittent daily  influenza   Vaccine 0.5 milliLiter(s) IntraMuscular once  sodium bicarbonate 650 milliGRAM(s) Oral three times a day  sodium zirconium cyclosilicate 10 Gram(s) Oral once    MEDICATIONS  (PRN):  traMADol 50 milliGRAM(s) Oral every 6 hours PRN Moderate Pain (4 - 6)    LABS:    trop                        6.3    6.36  )-----------( 399      ( 28 Oct 2021 16:15 )             20.1     10-28    132<L>  |  102  |  80<H>  ----------------------------<  92  5.4<H>   |  16<L>  |  9.04<H>    Ca    11.0<H>      28 Oct 2021 11:32  Mg     2.4     10    TPro  6.4  /  Alb  1.9<L>  /  TBili  0.2  /  DBili  x   /  AST  17  /  ALT  7<L>  /  AlkPhos  72  10-28      Urinalysis Basic - ( 27 Oct 2021 03:00 )    Color: Yellow / Appearance: Clear / S.010 / pH: x  Gluc: x / Ketone: Negative  / Bili: Negative / Urobili: Negative mg/dL   Blood: x / Protein: 30 mg/dL / Nitrite: Negative   Leuk Esterase: Negative / RBC: 0-2 /HPF / WBC 3-5   Sq Epi: x / Non Sq Epi: Few / Bacteria: Occasional      CAPILLARY BLOOD GLUCOSE            Urinalysis Basic - ( 27 Oct 2021 03:00 )    Color: Yellow / Appearance: Clear / S.010 / pH: x  Gluc: x / Ketone: Negative  / Bili: Negative / Urobili: Negative mg/dL   Blood: x / Protein: 30 mg/dL / Nitrite: Negative   Leuk Esterase: Negative / RBC: 0-2 /HPF / WBC 3-5   Sq Epi: x / Non Sq Epi: Few / Bacteria: Occasional      REVIEW OF SYSTEMS:  CONSTITUTIONAL: No fever, weight loss, or fatigue  RESPIRATORY: No cough, wheezing, chills or hemoptysis; No shortness of breath  CARDIOVASCULAR: No chest pain, palpitations, dizziness, or leg swelling  GASTROINTESTINAL: No abdominal or epigastric pain. No nausea, vomiting, or hematemesis; No diarrhea or constipation. No melena or hematochezia.  GENITOURINARY: No dysuria, frequency, hematuria, or incontinence  NEUROLOGICAL: No headaches, memory loss, loss of strength, numbness, or tremors  MUSCULOSKELETAL: No joint pain or swelling; No muscle, back, or extremity pain        RADIOLOGY & ADDITIONAL TESTS:    Imaging Personally Reviewed:  [ ] YES  [ ] NO    Consultant(s) Notes Reviewed:  [x ] YES  [ ] NO    PHYSICAL EXAM:  GENERAL: NAD  NERVOUS SYSTEM:  Alert & Oriented X3, moves all extremities  CHEST/LUNG: Clear to auscultation b/l.  HEART: Regular rate and rhythm; No murmurs, rubs, or gallops  ABDOMEN: Soft, Nontender, Nondistended; Bowel sounds present  EXTREMITIES:  R foot intact dressing, L foot toe swelling.      A & P:        Care Discussed with Consultants/Other Providers [ x] YES  [ ] NO

## 2021-10-28 NOTE — PROGRESS NOTE ADULT - SUBJECTIVE AND OBJECTIVE BOX
Podiatry pager #: [phone] ([place])/ [phone] ([facility])    Patient is a 66y old  Male who presents with a chief complaint of renal failure, hand/foot swelling (27 Oct 2021 18:41)       INTERVAL HPI/OVERNIGHT EVENTS:  Patient seen and evaluated at bedside.  Pt is resting comfortable in NAD. Denies N/V/F/C.     Allergies    No Known Allergies    Intolerances        Vital Signs Last 24 Hrs  T(C): 37.1 (28 Oct 2021 11:32), Max: 37.1 (28 Oct 2021 11:32)  T(F): 98.7 (28 Oct 2021 11:32), Max: 98.7 (28 Oct 2021 11:32)  HR: 82 (28 Oct 2021 11:32) (67 - 82)  BP: 145/58 (28 Oct 2021 11:32) (120/73 - 153/74)  BP(mean): --  RR: 17 (28 Oct 2021 11:32) (17 - 18)  SpO2: 100% (28 Oct 2021 11:32) (99% - 100%)    LABS:                        6.5    6.92  )-----------( 413      ( 28 Oct 2021 11:32 )             20.9     10-28    132<L>  |  102  |  80<H>  ----------------------------<  92  5.4<H>   |  16<L>  |  9.04<H>    Ca    11.0<H>      28 Oct 2021 11:32  Mg     2.4     10-26    TPro  6.4  /  Alb  1.9<L>  /  TBili  0.2  /  DBili  x   /  AST  17  /  ALT  7<L>  /  AlkPhos  72  10-28      Urinalysis Basic - ( 27 Oct 2021 03:00 )    Color: Yellow / Appearance: Clear / S.010 / pH: x  Gluc: x / Ketone: Negative  / Bili: Negative / Urobili: Negative mg/dL   Blood: x / Protein: 30 mg/dL / Nitrite: Negative   Leuk Esterase: Negative / RBC: 0-2 /HPF / WBC 3-5   Sq Epi: x / Non Sq Epi: Few / Bacteria: Occasional      CAPILLARY BLOOD GLUCOSE          Lower Extremity Physical Exam:  Vascular: DP/PT 2/4, B/L, CFT <3 seconds B/L, Temperature gradient warm to cool, B/L.   Neuro: Epicritic sensation intact to the level of digits, B/L.  Musculoskeletal/Ortho: B/L HAV R>L, hammered and laterally deviated digits x10  Skin: R foot hallux wound to medial aspect of IPJ with probe to bone with fibrous wound bed, no mal odor or erythema, 3cc of tophi expressed from wound, no tracking of wound, R foot 2nd digit with dusky changes, fluctulance noted along dorsal aspect with tense bullae along dorsal aspect of digit, wounds on medial and lateral aspect of digit at the level of the IPJ with tophi, wounds probe to bone, erythema to the digit, no malodor. Left foot wound to medial IPJ of hallux with probe to subQ, no signs of infection, 2nd digit dorsal wound to dermis with no signs of infection.     RADIOLOGY & ADDITIONAL TESTS:  < from: MR Foot No Cont, Right (10.27.21 @ 11:37) >  EXAM:  MR FOOT RT                            PROCEDURE DATE:  10/27/2021          INTERPRETATION:  RIGHT FOOT MRI    CLINICAL INFORMATION: Foot wound extending to the bone. Gout.  TECHNIQUE: Multiplanar, multisequence MRI was obtained of the right foot.    FINDINGS:    There are extensive periarticular infiltrating T1/STIR hypointense foci is ultimately in diffuse erosive changes. Findings are most consistent with patient's history of gout. There is replacement of the T1 marrow signal with associated edema of the mid/distal aspect of the proximal second phalanx and of the second middle phalanx, consistent with osteomyelitis. Faint nonspecific marrow edema with preservation of the T1 marrow signal is present within the fifth metatarsal head,which may represent sequela of periarticular erosive changes. Remaining marrow signal is grossly maintained. There is hallux valgus. There is diffuse muscle atrophy. 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    IMPRESSION:  Osteomyelitis of the second proximal and middle phalanges.   Diffuse hypointense periarticular foci with diffuse intra-articular and extra-articular osseous erosions, most consistent with patient's history of gout. Amyloid deposition can have a similar appearance, however, no history of long-term dialysis is present.    --- End of Report ---            STEPHANIE GUILLORY MD; Attending Radiologist  This document has been electronically signed. Oct 27 2021 12:58PM    < end of copied text >

## 2021-10-29 LAB
-  AMIKACIN: SIGNIFICANT CHANGE UP
-  AMOXICILLIN/CLAVULANIC ACID: SIGNIFICANT CHANGE UP
-  AMPICILLIN/SULBACTAM: SIGNIFICANT CHANGE UP
-  AMPICILLIN: SIGNIFICANT CHANGE UP
-  AZTREONAM: SIGNIFICANT CHANGE UP
-  CEFAZOLIN: SIGNIFICANT CHANGE UP
-  CEFEPIME: SIGNIFICANT CHANGE UP
-  CEFOXITIN: SIGNIFICANT CHANGE UP
-  CEFTRIAXONE: SIGNIFICANT CHANGE UP
-  CIPROFLOXACIN: SIGNIFICANT CHANGE UP
-  ERTAPENEM: SIGNIFICANT CHANGE UP
-  GENTAMICIN: SIGNIFICANT CHANGE UP
-  IMIPENEM: SIGNIFICANT CHANGE UP
-  LEVOFLOXACIN: SIGNIFICANT CHANGE UP
-  MEROPENEM: SIGNIFICANT CHANGE UP
-  PIPERACILLIN/TAZOBACTAM: SIGNIFICANT CHANGE UP
-  TOBRAMYCIN: SIGNIFICANT CHANGE UP
-  TRIMETHOPRIM/SULFAMETHOXAZOLE: SIGNIFICANT CHANGE UP
ANA TITR SER: NEGATIVE — SIGNIFICANT CHANGE UP
ANION GAP SERPL CALC-SCNC: 14 MMOL/L — SIGNIFICANT CHANGE UP (ref 5–17)
BLD GP AB SCN SERPL QL: SIGNIFICANT CHANGE UP
BUN SERPL-MCNC: 82 MG/DL — HIGH (ref 7–23)
CALCIUM SERPL-MCNC: 11 MG/DL — HIGH (ref 8.4–10.5)
CALCIUM SERPL-MCNC: 11.1 MG/DL — HIGH (ref 8.5–10.1)
CHLORIDE SERPL-SCNC: 103 MMOL/L — SIGNIFICANT CHANGE UP (ref 96–108)
CO2 SERPL-SCNC: 16 MMOL/L — LOW (ref 22–31)
CREAT SERPL-MCNC: 8.55 MG/DL — HIGH (ref 0.5–1.3)
CULTURE RESULTS: SIGNIFICANT CHANGE UP
EOSINOPHIL NFR URNS MANUAL: NEGATIVE — SIGNIFICANT CHANGE UP
FERRITIN SERPL-MCNC: 251 NG/ML — SIGNIFICANT CHANGE UP (ref 30–400)
FOLATE SERPL-MCNC: 8.8 NG/ML — SIGNIFICANT CHANGE UP
GLUCOSE SERPL-MCNC: 68 MG/DL — LOW (ref 70–99)
GRAM STN FLD: SIGNIFICANT CHANGE UP
HCT VFR BLD CALC: 23.8 % — LOW (ref 39–50)
HGB BLD-MCNC: 7.5 G/DL — LOW (ref 13–17)
INR BLD: 1.24 RATIO — HIGH (ref 0.88–1.16)
IRON SATN MFR SERPL: 38 % — SIGNIFICANT CHANGE UP (ref 16–55)
IRON SATN MFR SERPL: 63 UG/DL — SIGNIFICANT CHANGE UP (ref 45–165)
LEAD BLD-MCNC: 3 UG/DL — SIGNIFICANT CHANGE UP (ref 0–4)
MCHC RBC-ENTMCNC: 26.2 PG — LOW (ref 27–34)
MCHC RBC-ENTMCNC: 31.5 GM/DL — LOW (ref 32–36)
MCV RBC AUTO: 83.2 FL — SIGNIFICANT CHANGE UP (ref 80–100)
METHOD TYPE: SIGNIFICANT CHANGE UP
NRBC # BLD: 0 /100 WBCS — SIGNIFICANT CHANGE UP (ref 0–0)
ORGANISM # SPEC MICROSCOPIC CNT: SIGNIFICANT CHANGE UP
ORGANISM # SPEC MICROSCOPIC CNT: SIGNIFICANT CHANGE UP
PLATELET # BLD AUTO: 423 K/UL — HIGH (ref 150–400)
POTASSIUM SERPL-MCNC: 5.4 MMOL/L — HIGH (ref 3.5–5.3)
POTASSIUM SERPL-SCNC: 5.4 MMOL/L — HIGH (ref 3.5–5.3)
PROT ?TM UR-MCNC: 25 MG/DL — HIGH (ref 0–12)
PROTHROM AB SERPL-ACNC: 14.2 SEC — HIGH (ref 10.6–13.6)
PTH-INTACT FLD-MCNC: 8 PG/ML — LOW (ref 15–65)
RBC # BLD: 2.86 M/UL — LOW (ref 4.2–5.8)
RBC # FLD: 17.3 % — HIGH (ref 10.3–14.5)
SODIUM SERPL-SCNC: 133 MMOL/L — LOW (ref 135–145)
SPECIMEN SOURCE: SIGNIFICANT CHANGE UP
TIBC SERPL-MCNC: 165 UG/DL — LOW (ref 220–430)
UIBC SERPL-MCNC: 102 UG/DL — LOW (ref 110–370)
VANCOMYCIN TROUGH SERPL-MCNC: 22 UG/ML — HIGH (ref 10–20)
VIT B12 SERPL-MCNC: 1644 PG/ML — HIGH (ref 232–1245)
WBC # BLD: 6.72 K/UL — SIGNIFICANT CHANGE UP (ref 3.8–10.5)
WBC # FLD AUTO: 6.72 K/UL — SIGNIFICANT CHANGE UP (ref 3.8–10.5)

## 2021-10-29 PROCEDURE — 99233 SBSQ HOSP IP/OBS HIGH 50: CPT

## 2021-10-29 PROCEDURE — 99232 SBSQ HOSP IP/OBS MODERATE 35: CPT

## 2021-10-29 PROCEDURE — 99231 SBSQ HOSP IP/OBS SF/LOW 25: CPT

## 2021-10-29 PROCEDURE — 93925 LOWER EXTREMITY STUDY: CPT | Mod: 26

## 2021-10-29 RX ORDER — SODIUM ZIRCONIUM CYCLOSILICATE 10 G/10G
10 POWDER, FOR SUSPENSION ORAL EVERY 8 HOURS
Refills: 0 | Status: COMPLETED | OUTPATIENT
Start: 2021-10-29 | End: 2021-10-30

## 2021-10-29 RX ORDER — AMLODIPINE BESYLATE 2.5 MG/1
5 TABLET ORAL DAILY
Refills: 0 | Status: DISCONTINUED | OUTPATIENT
Start: 2021-10-29 | End: 2021-10-30

## 2021-10-29 RX ORDER — LIDOCAINE 4 G/100G
1 CREAM TOPICAL ONCE
Refills: 0 | Status: COMPLETED | OUTPATIENT
Start: 2021-10-29 | End: 2021-10-29

## 2021-10-29 RX ORDER — METRONIDAZOLE 500 MG
500 TABLET ORAL EVERY 8 HOURS
Refills: 0 | Status: DISCONTINUED | OUTPATIENT
Start: 2021-10-29 | End: 2021-11-03

## 2021-10-29 RX ORDER — AMLODIPINE BESYLATE 2.5 MG/1
2.5 TABLET ORAL DAILY
Refills: 0 | Status: DISCONTINUED | OUTPATIENT
Start: 2021-10-29 | End: 2021-10-29

## 2021-10-29 RX ORDER — SODIUM CHLORIDE 9 MG/ML
1000 INJECTION, SOLUTION INTRAVENOUS
Refills: 0 | Status: COMPLETED | OUTPATIENT
Start: 2021-10-29 | End: 2021-10-29

## 2021-10-29 RX ORDER — SENNA PLUS 8.6 MG/1
2 TABLET ORAL AT BEDTIME
Refills: 0 | Status: DISCONTINUED | OUTPATIENT
Start: 2021-10-29 | End: 2021-11-03

## 2021-10-29 RX ORDER — CEFTRIAXONE 500 MG/1
2000 INJECTION, POWDER, FOR SOLUTION INTRAMUSCULAR; INTRAVENOUS EVERY 24 HOURS
Refills: 0 | Status: DISCONTINUED | OUTPATIENT
Start: 2021-10-30 | End: 2021-11-03

## 2021-10-29 RX ADMIN — Medication 100 MILLIGRAM(S): at 15:02

## 2021-10-29 RX ADMIN — Medication 500 MILLIGRAM(S): at 21:46

## 2021-10-29 RX ADMIN — CEFEPIME 100 MILLIGRAM(S): 1 INJECTION, POWDER, FOR SOLUTION INTRAMUSCULAR; INTRAVENOUS at 12:53

## 2021-10-29 RX ADMIN — SODIUM ZIRCONIUM CYCLOSILICATE 10 GRAM(S): 10 POWDER, FOR SUSPENSION ORAL at 15:02

## 2021-10-29 RX ADMIN — SODIUM CHLORIDE 100 MILLILITER(S): 9 INJECTION, SOLUTION INTRAVENOUS at 10:47

## 2021-10-29 RX ADMIN — AMLODIPINE BESYLATE 5 MILLIGRAM(S): 2.5 TABLET ORAL at 15:03

## 2021-10-29 RX ADMIN — SODIUM ZIRCONIUM CYCLOSILICATE 10 GRAM(S): 10 POWDER, FOR SUSPENSION ORAL at 21:45

## 2021-10-29 RX ADMIN — Medication 650 MILLIGRAM(S): at 05:43

## 2021-10-29 RX ADMIN — Medication 650 MILLIGRAM(S): at 21:46

## 2021-10-29 RX ADMIN — SENNA PLUS 2 TABLET(S): 8.6 TABLET ORAL at 21:46

## 2021-10-29 RX ADMIN — Medication 650 MILLIGRAM(S): at 16:07

## 2021-10-29 RX ADMIN — LIDOCAINE 1 PATCH: 4 CREAM TOPICAL at 15:19

## 2021-10-29 RX ADMIN — LIDOCAINE 1 PATCH: 4 CREAM TOPICAL at 19:36

## 2021-10-29 NOTE — PHARMACOTHERAPY INTERVENTION NOTE - COMMENTS
Recommended discontinuing vancomycin level on 10/29 @ 14:00 as dose was given on 10/29 @ 16:50. The 14:00 level would be earlier than desired. Recommended discontinuing vancomycin level on 10/29/21 @ 14:00 as dose was given on 10/28/21 @ 16:50. The 14:00 level would be earlier than desired and higher than a true trough.

## 2021-10-29 NOTE — CONSULT NOTE ADULT - ASSESSMENT
Interventional Radiology  Evaluate for Procedure: renal bx    HPI: 66y Male with GWEN unclear etiology    Allergies: nkda  Medications (Abx/Cardiac/Anticoagulation/Blood Products)  cefepime   IVPB: 100 mL/Hr IV Intermittent (10-28 @ 12:15)  heparin   Injectable: 5000 Unit(s) SubCutaneous (10-28 @ 13:31)  vancomycin  IVPB: 250 mL/Hr IV Intermittent (10-27 @ 12:50)  vancomycin  IVPB: 166.67 mL/Hr IV Intermittent (10-28 @ 17:30)    Data:    T(C): 36.6  HR: 75  BP: 146/61  RR: 19  SpO2: 100%    -WBC 6.72 / HgB 7.5 / Hct 23.8 / Plt 423  -Na 133 / Cl 103 / BUN 82 / Glucose 68  -K 5.4 / CO2 16 / Cr 8.55  -ALT -- / Alk Phos -- / T.Bili --  -INR 1.24 / PTT --    Radiology: n/a    Assessment/Plan:   -66y Male with GWEN unclear etiology, request for renal biopsy  -Plan for Monday, requires significant medical optimization over the weekend including HgB>8, uremia correction (either HD and/or DDAVP, will need DDAVP Monday morning), and BP control (systolic no greater than 160).

## 2021-10-29 NOTE — DISCHARGE NOTE PROVIDER - CARE PROVIDER_API CALL
Sheryl Piedra (DPM)  Podiatric Medicine and Surgery  [address] [place] [address]  Phone: [phone]  Fax: [phone]  Follow Up Time:

## 2021-10-29 NOTE — PROGRESS NOTE ADULT - SUBJECTIVE AND OBJECTIVE BOX
INTERVAL HPI/OVERNIGHT EVENTS: pt seen and examined at bedside. denies any complaints, no events overnight.    66y  Vital Signs Last 24 Hrs  T(C): 36.8 (29 Oct 2021 11:16), Max: 36.8 (29 Oct 2021 11:16)  T(F): 98.3 (29 Oct 2021 11:16), Max: 98.3 (29 Oct 2021 11:16)  HR: 75 (29 Oct 2021 11:16) (70 - 84)  BP: 152/58 (29 Oct 2021 11:16) (143/56 - 172/74)  BP(mean): --  RR: 18 (29 Oct 2021 11:16) (17 - 19)  SpO2: 98% (29 Oct 2021 11:16) (96% - 100%)  I&O's Summary    28 Oct 2021 07:01  -  29 Oct 2021 07:00  --------------------------------------------------------  IN: 360 mL / OUT: 500 mL / NET: -140 mL    29 Oct 2021 07:01  -  29 Oct 2021 14:03  --------------------------------------------------------  IN: 0 mL / OUT: 400 mL / NET: -400 mL      MEDICATIONS  (STANDING):  allopurinol 100 milliGRAM(s) Oral daily  amLODIPine   Tablet 5 milliGRAM(s) Oral daily  cefepime   IVPB 500 milliGRAM(s) IV Intermittent daily  influenza   Vaccine 0.5 milliLiter(s) IntraMuscular once  sodium bicarbonate 650 milliGRAM(s) Oral three times a day  sodium zirconium cyclosilicate 10 Gram(s) Oral every 8 hours    MEDICATIONS  (PRN):  traMADol 50 milliGRAM(s) Oral every 6 hours PRN Moderate Pain (4 - 6)    LABS:    trop                        7.5    6.72  )-----------( 423      ( 29 Oct 2021 07:44 )             23.8     10-29    133<L>  |  103  |  82<H>  ----------------------------<  68<L>  5.4<H>   |  16<L>  |  8.55<H>    Ca    11.1<H>      29 Oct 2021 07:44    TPro  6.0  /  Alb  x   /  TBili  x   /  DBili  x   /  AST  x   /  ALT  x   /  AlkPhos  x   10-28    PT/INR - ( 29 Oct 2021 07:44 )   PT: 14.2 sec;   INR: 1.24 ratio             CAPILLARY BLOOD GLUCOSE              REVIEW OF SYSTEMS:  CONSTITUTIONAL: No fever, weight loss, or fatigue  RESPIRATORY: No cough, wheezing, chills or hemoptysis; No shortness of breath  CARDIOVASCULAR: No chest pain, palpitations, dizziness, or leg swelling  GASTROINTESTINAL: No abdominal or epigastric pain. No nausea, vomiting, or hematemesis; No diarrhea or constipation. No melena or hematochezia.  GENITOURINARY: No dysuria, frequency, hematuria, or incontinence  NEUROLOGICAL: No headaches, memory loss, loss of strength, numbness, or tremors  MUSCULOSKELETAL: No joint pain or swelling; No muscle, back, or extremity pain      RADIOLOGY & ADDITIONAL TESTS:    Imaging Personally Reviewed:  [ ] YES  [ ] NO    Consultant(s) Notes Reviewed:  [x ] YES  [ ] NO    PHYSICAL EXAM:  GENERAL: NAD  NERVOUS SYSTEM:  Alert & Oriented X3, moves all extremities  CHEST/LUNG: Clear to auscultation b/l.  HEART: Regular rate and rhythm; No murmurs, rubs, or gallops  ABDOMEN: Soft, Nontender, Nondistended; Bowel sounds present  EXTREMITIES:  R foot intact dressing, L foot toe swelling.      A & P:        Care Discussed with Consultants/Other Providers [ x] YES  [ ] NO

## 2021-10-29 NOTE — DISCHARGE NOTE PROVIDER - NSDCMRMEDTOKEN_GEN_ALL_CORE_FT
amLODIPine 10 mg oral tablet: 1 tab(s) orally once a day MDD:1  Augmentin 500 mg-125 mg oral tablet: 1 tab(s) orally every 12 hours MDD:2 take medication twice a day until 11/10/2021  bisacodyl 5 mg oral delayed release tablet: 1 tab(s) orally every 12 hours, As needed, Constipation  febuxostat 40 mg oral tablet: 1 tab(s) orally once a day  hydrALAZINE 25 mg oral tablet: 1 tab(s) orally every 8 hours  metroNIDAZOLE 500 mg oral tablet: 1 tab(s) orally every 8 hours  pantoprazole 40 mg oral granule, delayed release: 1 tab(s) orally once a day   predniSONE 20 mg oral tablet: 2 tab(s) orally once a day  predniSONE 20 mg oral tablet: 2 tab(s) orally once a day  senna oral tablet: 2 tab(s) orally once a day (at bedtime)  sodium bicarbonate 650 mg oral tablet: 1 tab(s) orally 3 times a day  traMADol 50 mg oral tablet: 1 tab(s) orally every 6 hours, As needed, Moderate Pain (4 - 6) MDD:4   amLODIPine 10 mg oral tablet: 1 tab(s) orally once a day MDD:1  Augmentin 500 mg-125 mg oral tablet: 1 tab(s) orally every 12 hours MDD:2 take medication twice a day until 11/10/2021  bisacodyl 5 mg oral delayed release tablet: 1 tab(s) orally every 12 hours, As needed, Constipation  bumetanide 1 mg oral tablet: 1 tab(s) orally once a day  febuxostat 40 mg oral tablet: 1 tab(s) orally once a day  hydrALAZINE 25 mg oral tablet: 1 tab(s) orally every 8 hours  metroNIDAZOLE 500 mg oral tablet: 1 tab(s) orally every 8 hours  pantoprazole 40 mg oral granule, delayed release: 1 tab(s) orally once a day   predniSONE 20 mg oral tablet: 2 tab(s) orally once a day  predniSONE 20 mg oral tablet: 2 tab(s) orally once a day  senna oral tablet: 2 tab(s) orally once a day (at bedtime)  sodium bicarbonate 650 mg oral tablet: 1 tab(s) orally 3 times a day  traMADol 50 mg oral tablet: 1 tab(s) orally every 6 hours, As needed, Moderate Pain (4 - 6) MDD:4

## 2021-10-29 NOTE — DISCHARGE NOTE PROVIDER - NSDCCPCAREPLAN_GEN_ALL_CORE_FT
PRINCIPAL DISCHARGE DIAGNOSIS  Diagnosis: Acute renal failure  Assessment and Plan of Treatment:       SECONDARY DISCHARGE DIAGNOSES  Diagnosis: Tophaceous gout  Assessment and Plan of Treatment:     Diagnosis: Cellulitis of hand, right  Assessment and Plan of Treatment:     Diagnosis: Cellulitis of right foot  Assessment and Plan of Treatment: follow podiatry directions    Diagnosis: Hypertensive emergency  Assessment and Plan of Treatment: continue antibiotics started in the hospital     PRINCIPAL DISCHARGE DIAGNOSIS  Diagnosis: Acute renal failure  Assessment and Plan of Treatment: follow up with dr simmons for results of biopsy and monitoring of your kidney function      SECONDARY DISCHARGE DIAGNOSES  Diagnosis: Tophaceous gout  Assessment and Plan of Treatment: follow with dr whitehead    Diagnosis: Cellulitis of hand, right  Assessment and Plan of Treatment:     Diagnosis: Cellulitis of right foot  Assessment and Plan of Treatment: follow podiatry directions    Diagnosis: Hypertensive emergency  Assessment and Plan of Treatment: continue antibiotics started in the hospital

## 2021-10-29 NOTE — DISCHARGE NOTE PROVIDER - NSDCFUADDINST_GEN_ALL_CORE_FT
Podiatry Discharge Instructions:  Wound Care: Please apply mupirocin to R foot wounds and dress with gauze and cling.  Weight bearing: Please weight bear as tolerated in a surgical shoe.  Antibiotics: Please continue as instructed.    Follow up with your primary care provider  Take all medication as prescribed. Many of the blood pressure medications that I sent to your pharmacy will only have a minimal number of pills. Please have your primary care provider make out patient decisions as to whether to continue the medications started in the hospital.   Antibiotics are to be followed exactly as prescribed. Please contact Dr. Piedra with any concerns

## 2021-10-29 NOTE — PROGRESS NOTE ADULT - SUBJECTIVE AND OBJECTIVE BOX
[facility] NEPHROLOGY SERVICES, [facility]  NEPHROLOGY AND HYPERTENSION  [address]  [address]  [place] [id number]  [phone]    MD ELLI WHITTAKER, MD VANDANA BULLARD, MD ROBBI LUCERO, MD RUBEN SOLORZANO, MD JANENE MATTA MD          Patient events noted  feels well no distress;     MEDICATIONS  (STANDING):  allopurinol 100 milliGRAM(s) Oral daily  amLODIPine   Tablet 5 milliGRAM(s) Oral daily  influenza   Vaccine 0.5 milliLiter(s) IntraMuscular once  metroNIDAZOLE    Tablet 500 milliGRAM(s) Oral every 8 hours  senna 2 Tablet(s) Oral at bedtime  sodium bicarbonate 650 milliGRAM(s) Oral three times a day  sodium zirconium cyclosilicate 10 Gram(s) Oral every 8 hours    MEDICATIONS  (PRN):  bisacodyl 5 milliGRAM(s) Oral every 12 hours PRN Constipation  traMADol 50 milliGRAM(s) Oral every 6 hours PRN Moderate Pain (4 - 6)      10-28-21 @ 07:01  -  10-29-21 @ 07:00  --------------------------------------------------------  IN: 360 mL / OUT: 500 mL / NET: -140 mL    10-29-21 @ 07:01  -  10-29-21 @ 18:31  --------------------------------------------------------  IN: 0 mL / OUT: 400 mL / NET: -400 mL      PHYSICAL EXAM:      T(C): 36.6 (10-29-21 @ 17:20), Max: 36.8 (10-29-21 @ 11:16)  HR: 74 (10-29-21 @ 17:20) (70 - 76)  BP: 148/- (10-29-21 @ 17:20) (143/56 - 171/77)  RR: 18 (10-29-21 @ 17:20) (17 - 19)  SpO2: 98% (10-29-21 @ 17:20) (96% - 100%)  Wt(kg): --  Lungs clear  Heart S1S2  Abd soft NT ND  Extremities:   bilateral hand tophi edema                                    7.5    6.72  )-----------( 423      ( 29 Oct 2021 07:44 )             23.8     10-29    133<L>  |  103  |  82<H>  ----------------------------<  68<L>  5.4<H>   |  16<L>  |  8.55<H>    Ca    11.1<H>      29 Oct 2021 07:44    TPro  6.0  /  Alb  x   /  TBili  x   /  DBili  x   /  AST  x   /  ALT  x   /  AlkPhos  x   10-28      LIVER FUNCTIONS - ( 28 Oct 2021 15:29 )  Alb: x     / Pro: 6.0 g/dL / ALK PHOS: x     / ALT: x     / AST: x     / GGT: x           Creatinine Trend: 8.55<--, 9.04<--, 9.11<--, 9.72<--          Assessment   GWEN degree of CKD unclear, associated with tophaceous gout, anemia and hypercalcemia   r/o Urate nephropathy; paraprotein related disease; occult GN less likely  Mild hyperkalemia/acidosis  Hypercalcemia    Plan  Low K diet; Lokelma 10 grams PO   Trial IV bicarbonate infusion  Paraprotein screen; serologies;   Pb level  noted  Renal biopsy Monday  Allopurinol 100 mg PO QD  Rheum evaluation     Marcel Carmona MD

## 2021-10-29 NOTE — PROGRESS NOTE ADULT - SUBJECTIVE AND OBJECTIVE BOX
Podiatry pager #: [phone] ([place])/ [phone] ([facility])    Patient is a 66y old  Male who presents with a chief complaint of renal failure, hand/foot swelling (28 Oct 2021 18:22)       INTERVAL HPI/OVERNIGHT EVENTS:  Patient seen and evaluated at bedside.  Pt is resting comfortable in NAD. Denies N/V/F/C.     Allergies    No Known Allergies    Intolerances        Vital Signs Last 24 Hrs  T(C): 36.8 (29 Oct 2021 11:16), Max: 36.8 (29 Oct 2021 11:16)  T(F): 98.3 (29 Oct 2021 11:16), Max: 98.3 (29 Oct 2021 11:16)  HR: 75 (29 Oct 2021 11:16) (70 - 84)  BP: 152/58 (29 Oct 2021 11:16) (143/56 - 172/74)  BP(mean): --  RR: 18 (29 Oct 2021 11:16) (17 - 19)  SpO2: 98% (29 Oct 2021 11:16) (96% - 100%)    LABS:                        7.5    6.72  )-----------( 423      ( 29 Oct 2021 07:44 )             23.8     10-29    133<L>  |  103  |  82<H>  ----------------------------<  68<L>  5.4<H>   |  16<L>  |  8.55<H>    Ca    11.1<H>      29 Oct 2021 07:44    TPro  6.0  /  Alb  x   /  TBili  x   /  DBili  x   /  AST  x   /  ALT  x   /  AlkPhos  x   10-28    PT/INR - ( 29 Oct 2021 07:44 )   PT: 14.2 sec;   INR: 1.24 ratio             CAPILLARY BLOOD GLUCOSE          Lower Extremity Physical Exam:  Vascular: DP/PT 2/4, B/L, CFT <3 seconds B/L, Temperature gradient warm to cool, B/L.   Neuro: Epicritic sensation intact to the level of digits, B/L.  Musculoskeletal/Ortho: B/L HAV R>L, hammered and laterally deviated digits x10  Skin: R foot hallux wound to medial aspect of IPJ to bone with fibrous wound bed, no malodor or erythema,  R foot 2nd digit s/p bedside I&D, no pus, no further gouty tophi expressed    RADIOLOGY & ADDITIONAL TESTS:

## 2021-10-29 NOTE — PHARMACOTHERAPY INTERVENTION NOTE - COMMENTS
Recommended giving vancomycin 1250 mg IV x1 dose given the fact that the patient had a level of 11.2 approximately 23 hours after a 1 gm IV dose x1. Currently level is significantly lower than expected given SCr of 9.04. Subsequent dosing to be determined by level. Target level: 15-20.

## 2021-10-29 NOTE — PROGRESS NOTE ADULT - ASSESSMENT
66M s/p bedside I&D of R foot 2nd digit  - Pt assessed and evaluated bedside   - afebrile, WBC 6.7  - R foot hallux wound to medial aspect of IPJ to bone with fibrous wound bed, no malodor or erythema,  R foot 2nd digit s/p bedside I&D, no pus, no further gouty tophi expressed  - R foot MRI reviewed shoring possible OM 2nd toe middle and proximal phalanx, erosions more likely due to gout rather than infectious process   - pt wishes to opt for conservative management   - no podiatry surgical intervention at this time  - pod podiatry plan for local wound care  - toradol and colchicine in renal dose for gout   - follow up information and instructions can be found in the follow up section of the provider d/ note   - seen with attending

## 2021-10-29 NOTE — DISCHARGE NOTE PROVIDER - NSDCFUADDAPPT_GEN_ALL_CORE_FT
Podiatry Discharge Instructions:  Follow up: Please follow up with Dr. Piedra within 1 week of discharge from the hospital, please call [phone] for appointment and discuss that you recently were seen in the hospital.  Wound Care: Please apply mupirocin to R foot hallux wound, pack 2nd toe wound with 1/4 inch packing and dress with gauze and cling, three times a week.  Weight bearing: Please weight bear as tolerated in a surgical shoe.  Antibiotics: Please continue as instructed. Podiatry Discharge Instructions:  Follow up: Please follow up with Dr. Piedra within 1 week of discharge from the hospital, please call [phone] for appointment and discuss that you recently were seen in the hospital.  Wound Care: Please apply mupirocin to R foot wounds and dress with gauze and cling.  Weight bearing: Please weight bear as tolerated in a surgical shoe.  Antibiotics: Please continue as instructed. Follow up: Please follow up with Dr. Piedra within 1 week of discharge from the hospital, please call [phone] for appointment and discuss that you recently were seen in the hospital.

## 2021-10-29 NOTE — PROGRESS NOTE ADULT - ASSESSMENT
66M with no reported PMH who was sent to the ED for abnormal labs. Patient found to have severe gouty changes with high uric acid levels and is in renal failure with creatinine ~9 hand xray shows severe gouty changes  MRI right foot with osteomyelitis of 2nd phalanges  unclear etiology yet of renal failure but ould be related to severe untreated gout   low term antibiotics might be difficult in an individual who hasn't taken care of himself properly in 6 years. In this case, assuming the MRI findings are infectious and not related to rheumatologic disorder, it would be ideal to undergo amputation. would need to assess ability to heal and will order judith.pvr.       1. Osteomyelitis  MRI R foot: OM of the 2nd prox and middle phalanges.  started on IV vanco and cefepime, renally dosed  vanco trough 11.2 was given 1250mg (10/28), repeat trough today, redose if trough <15.  ID on board  Podiatry on board  f/u foot cx, prelim klebsiella  f/u JUDITH/PVR    2. GWEN  r/o urate nephropathy, ? paraprotein dx  elev Kappa/ lambda, most likely due to kidney injury, ratio is Normal, f/u SPEP, JOHN  Nephrology on board  Renal bx on monday.  f/u bmp    3. Hyperkalemia/ mild acidosis  in the [facility] GWEN  c/w lokelmaNOVA  Nephrology on board    4. Gout  c/w allopurinol  f/u with rheumatology as out patient.    5. Anemia of chronic disease.  could be sec RF vs r/o myeloma  awaiting SPEP and JOHN  pt denies any melena, hematochezia, hematemesis  f/u stool occult  s/p 1U prbc, f/u cbc            66M with no reported PMH who was sent to the ED for abnormal labs. Patient found to have severe gouty changes with high uric acid levels and is in renal failure with creatinine ~9 hand xray shows severe gouty changes  MRI right foot with osteomyelitis of 2nd phalanges  unclear etiology yet of renal failure but ould be related to severe untreated gout   low term antibiotics might be difficult in an individual who hasn't taken care of himself properly in 6 years. In this case, assuming the MRI findings are infectious and not related to rheumatologic disorder, it would be ideal to undergo amputation. would need to assess ability to heal and will order ravindra.pvr.       1. Osteomyelitis  MRI R foot: OM of the 2nd prox and middle phalanges.  cx klebsiella and bacteroides  abx changed to rocephin and flagyl.  Pt is now reconsidering undergoing amputation, notified podiatry resident.  ID on board  Podiatry on board  f/u RAVINDRA/PVR    2. GWEN  r/o urate nephropathy, ? paraprotein dx  elev Kappa/ lambda, most likely due to kidney injury, ratio is Normal, f/u SPEP, JOHN  Nephrology on board  Renal bx on monday.  f/u bmp    3. Hyperkalemia/ mild acidosis  in the [facility] GWEN  c/w [place] [id number]  Nephrology on board    4. Gout  c/w allopurinol  f/u with rheumatology as out patient.    5. Anemia of chronic disease.  could be sec RF vs r/o myeloma  awaiting SPEP and JOHN  pt denies any melena, hematochezia, hematemesis  f/u stool occult  will transfuse 1more unit for the procedure on monday.            66M with no reported PMH who was sent to the ED for abnormal labs. Patient found to have severe gouty changes with high uric acid levels and is in renal failure with creatinine ~9 hand xray shows severe gouty changes  MRI right foot with osteomyelitis of 2nd phalanges     1. Osteomyelitis  MRI R foot: OM of the 2nd prox and middle phalanges.  cx klebsiella and bacteroides  abx changed to rocephin and flagyl.  Pt is now reconsidering undergoing amputation, notified podiatry resident.  ID on board  Podiatry on board  f/u RAVINDRA/PVR    2. GWEN  r/o urate nephropathy, ? paraprotein dx  elev Kappa/ lambda, most likely due to kidney injury, ratio is Normal, f/u SPEP, JOHN  Nephrology on board  Renal bx on monday.  f/u bmp    3. Hyperkalemia/ mild acidosis  in the [facility] GWEN  c/w [facility]  Nephrology on board    4. Gout  c/w allopurinol  f/u with rheumatology as out patient.    5. Anemia of chronic disease.  could be sec RF vs r/o myeloma  awaiting SPEP and JOHN  pt denies any melena, hematochezia, hematemesis  f/u stool occult  will transfuse 1more unit for the procedure on monday.    6. HTN  started on amlodipine.

## 2021-10-29 NOTE — PHARMACOTHERAPY INTERVENTION NOTE - COMMENTS
Recommended obtaining vancomycin level approximately 23 hours after the vancomycin 1250 mg IV x1 dose on 10/28/21. Dose was given at approximately 16:50. Level to be drawn on 10/29/21 at 16:00.

## 2021-10-29 NOTE — DISCHARGE NOTE PROVIDER - HOSPITAL COURSE
Patient is a 66M with no reported PMH who was sent to the ED for abnormal labs.  Patient went to an urgent care as he was concerned of an infection to his R hand and R foot.  Was given PO antibiotics there, was later called and instructed to goto the emergency room for elevated creatinine.  Denies history of known kidney disease, states that he urinates multiple times per day.  Patient reports chronic deformity and swelling of his fingers, has not seen a physician for his condition.  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  Lives alone.  NKDA, does not take daily medications.  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  Patient has no other complaints.  Hypertensive in ED to 197/82, labs show significant creatinine elevation.        Problem: Renal mass.   ·  Plan: CT abdomen to eval mass on US.  ·  Problem: Rheumatoid arthritis.   ·  Plan: will send ESR/CRP, JAMES, RF.  Ulcerations unlikely to be from cellulitis but more likely to be from extensive rheumatologic disease.  Will hold further antibiotic  ·  Problem: Acute renal failure.   ·  Plan: patient likely has significant renal failure due to low BUN:Cr ratio, signs of AOCD, elevated calcium.  Will hydrate, trend labs.  Renal eval in AM - Kristine.    Podiatry:    - R foot hallux wound to medial aspect of IPJ to bone with fibrous wound bed, no malodor or erythema, s/p 10/27 bedside R foot 2nd digit I&D, no pus, no further gouty tophi expressed  - R foot MRI reviewed shoring possible OM 2nd toe middle and proximal phalanx, erosions more likely due to gout rather than infectious process   - no podiatry surgical intervention at this time, erosions on MRI 2/2 gout rather than infectious process  - pod podiatry plan for local wound care  - ID recs PO Aug for total 14 days upon d/c  - pod stable for discharge, follow up information and instructions can be found in the follow up section of the provider d/c note -      ID:  continue ceftriaxone 2g q24hrs  d/c planning: Augmentin 500mg q12hrs (renally dosed) until 11/10/21  continue PO flagyl   blood cultures NGTD   RAVINDRA/PVR- some mild disease but overall good vasculature     path from renal biopsy ureate nephropathy  possible interstitial nephritis   ok from an ID standpoint to give steroids       HTN  started on amlodipine.  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 Kappa/ lambda, most likely due to kidney injury, ratio is Normal, f/u SPEP, IFE, Bence Jones  still pending   Nephrology on board  Renal bx on today   11/2/2021 f/u renal biopsy                8.3    5.17  )-----------( 471      ( 11-03 @ 08:02 )             25.8                8.3    9.95  )-----------( 456      ( 11-02 @ 06:31 )             26.3                7.6    8.38  )-----------( 379      ( 11-01 @ 23:23 )             23.2     11-03    133<L>  |  98     |  74<H>  ----------------------------<  156<H>  4.5     |  22     |  7.80<H>  11-02    134<L>  |  101    |  77<H>  ----------------------------<  102<H>  4.7     |  20<L>  |  8.31<H>    Ca    11.2<H>      03 Nov 2021 08:02  Ca    11.5<H>      02 Nov 2021 06:31  Phos  5.3       11-03  Mg     2.1       11-03    TPro  x      /  Alb  2.2<L>  /  TBili  x      /  DBili  x      /  AST  x      /  ALT  x      /  AlkPhos  x      11-03       66M with no reported PMH who was sent to the ED for abnormal labs. Patient found to have severe gouty changes with high uric acid levels and is in renal failure with creatinine ~9 hand xray shows severe gouty changes  MRI right foot with osteomyelitis of 2nd phalanges     1. Osteomyelitis  MRI R foot: OM of the 2nd prox and middle phalanges.  cx klebsiella and bacteroides  abx changed to rocephin and flagyl.  Pt is now reconsidering undergoing amputation, notified podiatry resident.  ID on board  Podiatry on board   RAVINDRA as above     Cleared by pod/id dc on po abx     2. GWEN  r/o urate nephropathy, ? paraprotein dx  elev Kappa/ lambda, most likely due to kidney injury, ratio is Normal, f/u SPEP, JOHN, bence jones  still pending   Nephrology on board  Renal bx on today   11/2/2021 f/u renal biopsy     Start prednisone and dc w renal f/u     3. Hyperkalemia/ mild acidosis  in the [facility] GWEN  c/w lokelma, [facility]  Nephrology on board   resolved hyperkalemia    4. Gout  c/w allopurinol pain meds, superimposed infection  get  rheumatology consult    10/31/2021 consult noted     5. Anemia of chronic disease.  could be sec RF vs r/o myeloma  awaiting SPEP and JOHN and bence jones  ( still pending on 11/2/2021)  pt denies any melena, hematochezia, hematemesis  f/u stool occult ( was canceled by lab and no one informed MD ), will reorder    s/p 2 units prbc since admission  on 10/28 and again 10/29/2021 monitor hgb   10/31/2021 hgb low but stable  11/2/2021 low but stable     6. HTN  started on amlodipine.  10/30/2021 increase Norvasc  10/31/2021 Bp still elevated adding hydralazine  11/1/2021 uptitrate hydralazine as needed            pt seen and examined 45 min spent on dc planning     Lab test review, Radiology Review, Vitals review, Consultant review and discussion, Physical examination, IDR, Assessment and plan; Plan discussion with patient and family

## 2021-10-29 NOTE — PROGRESS NOTE ADULT - SUBJECTIVE AND OBJECTIVE BOX
MARLA WELLS III  MRN-[id number]    Interval History: The pt was seen and examined earlier, no distress, no new complains. The pt is afebrile, on RA, initially decided to take 6 weeks of antibiotics but after further discussion regarding risks and benefits of both options, namely amputation vs long term antibiotics he is reconsidering undergoing the amputation. ROS of negative unless otherwise states     PAST MEDICAL & SURGICAL HISTORY:  No pertinent past medical history        ROS:    [ ] Unobtainable because:  [x ] All other systems negative          Allergies  No Known Allergies        ANTIMICROBIALS:    influenza   Vaccine 0.5 once  metroNIDAZOLE    Tablet 500 every 8 hours  ceftriaxone     MEDICATIONS  (STANDING):  allopurinol 100 daily  amLODIPine   Tablet 5 daily  influenza   Vaccine 0.5 once  senna 2 at bedtime      PRN  bisacodyl 5 milliGRAM(s) Oral every 12 hours PRN  traMADol 50 milliGRAM(s) Oral every 6 hours PRN      Physical Exam:  Vital Signs Last 24 Hrs  T(F): 98.3 (10-29-21 @ 11:16), Max: 98.3 (10-29-21 @ 11:16)  HR: 75 (10-29-21 @ 11:16)  BP: 152/58 (10-29-21 @ 11:16)  RR: 18 (10-29-21 @ 11:16)  SpO2: 98% (10-29-21 @ 11:16) (96% - 100%)  Wt(kg): --    Physical Exam:  Constitutional: non-toxic, no distress  HEAD/EYES: anicteric, no conjunctival injection  ENT:  supple, no thrush, poor dentition with broken teeth and multiple carries   Cardiovascular:   normal S1, S2, no murmur, no edema  Respiratory:  clear BS bilaterally, no wheezes, no rales  GI:  soft, non-tender, normal bowel sounds  :  no arellano, no CVA tenderness  Musculoskeletal:  no synovitis, normal ROM, tophaceous gouty changes of all 10 fingers with right index finger having tophi eroding through the skin, feet wrapped bilaterally   Neurologic: awake and alert, normal strength, no focal findings  Skin:  no rash, no erythema, no phlebitis  Heme/Onc: no cervical  lymphadenopathy   Psychiatric:  awake, alert, appropriate mood    WBC Count: 6.72 K/uL (10-29 @ 07:44)  WBC Count: 6.36 K/uL (10-28 @ 16:15)  WBC Count: 6.92 K/uL (10-28 @ 11:32)  WBC Count: 6.98 K/uL (10-26 @ 22:16)                            7.5    6.72  )-----------( 423      ( 29 Oct 2021 07:44 )             23.8       10-29    133<L>  |  103  |  82<H>  ----------------------------<  68<L>  5.4<H>   |  16<L>  |  8.55<H>    Ca    11.1<H>      29 Oct 2021 07:44    TPro  6.0  /  Alb  x   /  TBili  x   /  DBili  x   /  AST  x   /  ALT  x   /  AlkPhos  x   10-28      Creatinine Trend: 8.55<--, 9.04<--, 9.11<--, 9.72<--        Urinalysis Basic - ( 27 Oct 2021 03:00 )    Color: Yellow / Appearance: Clear / S.010 / pH: x  Gluc: x / Ketone: Negative  / Bili: Negative / Urobili: Negative mg/dL   Blood: x / Protein: 30 mg/dL / Nitrite: Negative   Leuk Esterase: Negative / RBC: 0-2 /HPF / WBC 3-5   Sq Epi: x / Non Sq Epi: Few / Bacteria: Occasional        Creatinine Trend: 9.04<--, 9.11<--, 9.72<--  Lactate, Blood: 2.0 mmol/L (10-27-21 @ 01:42)  Lactate, Blood: 3.5 mmol/L (10-26-21 @ 22:16)      MICROBIOLOGY:  Vancomycin Level, Trough: 11.2 ug/mL (10-28-21 @ 11:32)  v  .Tissue R foot  10-27-21   Numerous Klebsiella oxytoca/Raoutella ornithinolytica  --    Few polymorphonuclear leukocytes  Numerous Gram Negative Rods per oil power field  Moderate Gram positive cocci in pairs per oil power field      Clean Catch Clean Catch (Midstream)  10-27-21   <10,000 CFU/mL Normal Urogenital Meenu  --  --      .Blood Blood  10-27-21   No growth to date.  --  --          Rapid RVP Result: NotDetec (10-27 @ 05:27)        C-Reactive Protein, Serum: 75 (10-27)        SARS-CoV-2: NotDetec (27 Oct 2021 05:27)    RADIOLOGY   MR Foot No Cont, Right (10.27.21 @ 11:37) >    FINDINGS:    There are extensive periarticular infiltrating T1/STIR hypointense foci is ultimately in diffuse erosive changes. Findings are most consistent with patient's history of gout. There is replacement of the T1 marrow signal with associated edema of the mid/distal aspect of the proximal second phalanx and of the second middle phalanx, consistent with osteomyelitis. Faint nonspecific marrow edema with preservation of the T1 marrow signal is present within the fifth metatarsal head,which may represent sequela of periarticular erosive changes. Remaining marrow signal is grossly maintained. There is hallux valgus. There is diffuse muscle atrophy. 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    IMPRESSION:  Osteomyelitis of the second proximal and middle phalanges.   Diffuse hypointense periarticular foci with diffuse intra-articular and extra-articular osseous erosions, most consistent with patient's history of gout. Amyloid deposition can have a similar appearance, however, no history of long-term dialysis is present.

## 2021-10-29 NOTE — PROGRESS NOTE ADULT - ASSESSMENT
66M with no reported PMH who was sent to the ED for abnormal labs.   Patient found to have severe gouty changes with high uric acid levels and is in renal failure with creatinine ~9   hand xray shows severe gouty changes  MRI right foot with osteomyelitis of 2nd phalanges  unclear etiology yet of renal failure but ould be related to severe untreated gout   low term antibiotics might be difficult in an individual who hasn't taken care of himself properly in 6 years. In this case, assuming the MRI findings are infectious and not related to rheumatologic disorder, it would be ideal to undergo amputation. would need to assess ability to heal and will order [email or web address].     10/28: MRI with Osteomyelitis of the right second proximal and middle phalanges, pt is thinking about his options, would like to talk to his son. The pt is afebrile, on RA, no leukocytosis, anemic, wound culture is growing Klebsiella, Vancomycin trough is 11.2, give one time dose of Vancomycin 1250 mg IV now, will repeat vancomycin trough in 23-24 hrs, cefepime renally dosed continued.   10/29: decided to reconsider amputation, cultures with klebsiella and bacteroides thus have changed to ceftriaxone and PO flagyl, renal biopsy possibly on Monday      osteomyelitis  Chronic kidney disease  Gout   therapeutic drug monitoring required with IV vancomycin      Plan:   Vancomycin 1250 mg  obtain vancomycin trough 23-24 hrs after last dose and re-dose once level below 15 - ordered   stop cefepime  start ceftriaxone 2g q24hrs  start PO flagyl   blood cultures NGTD  favor amputation   RAVINDRA/PVR- some mild disease but overall good vasculature     #Kidney disease  nephro following  renal biopsy Monday  follow up on all labs sent by nephro  avoid nephrotoxic drugs    #Gout  allopurinol   education on diet and which foods/food categories to avoid  consider dietician consultation       Discussed with Dr. Lazarus Marie,   Infectious Disease Attending  Pager [phone]  After 5pm/weekends please call [phone] for all inquiries and new consults

## 2021-10-30 LAB
24R-OH-CALCIDIOL SERPL-MCNC: 25.4 NG/ML — LOW (ref 30–80)
ANION GAP SERPL CALC-SCNC: 11 MMOL/L — SIGNIFICANT CHANGE UP (ref 5–17)
APTT BLD: 31.9 SEC — SIGNIFICANT CHANGE UP (ref 27.5–35.5)
BUN SERPL-MCNC: 79 MG/DL — HIGH (ref 7–23)
CA-I BLD-SCNC: 1.56 MMOL/L — HIGH (ref 1.15–1.33)
CALCIUM SERPL-MCNC: 11.4 MG/DL — HIGH (ref 8.5–10.1)
CHLORIDE SERPL-SCNC: 102 MMOL/L — SIGNIFICANT CHANGE UP (ref 96–108)
CO2 SERPL-SCNC: 20 MMOL/L — LOW (ref 22–31)
CREAT SERPL-MCNC: 8.39 MG/DL — HIGH (ref 0.5–1.3)
GLUCOSE SERPL-MCNC: 91 MG/DL — SIGNIFICANT CHANGE UP (ref 70–99)
HCT VFR BLD CALC: 25.8 % — LOW (ref 39–50)
HGB BLD-MCNC: 8.5 G/DL — LOW (ref 13–17)
MCHC RBC-ENTMCNC: 26.8 PG — LOW (ref 27–34)
MCHC RBC-ENTMCNC: 32.9 GM/DL — SIGNIFICANT CHANGE UP (ref 32–36)
MCV RBC AUTO: 81.4 FL — SIGNIFICANT CHANGE UP (ref 80–100)
NRBC # BLD: 0 /100 WBCS — SIGNIFICANT CHANGE UP (ref 0–0)
PLATELET # BLD AUTO: 403 K/UL — HIGH (ref 150–400)
POTASSIUM SERPL-MCNC: 4.7 MMOL/L — SIGNIFICANT CHANGE UP (ref 3.5–5.3)
POTASSIUM SERPL-SCNC: 4.7 MMOL/L — SIGNIFICANT CHANGE UP (ref 3.5–5.3)
PROT SERPL-MCNC: 6.5 G/DL — SIGNIFICANT CHANGE UP (ref 6–8.3)
PROT SERPL-MCNC: 6.5 G/DL — SIGNIFICANT CHANGE UP (ref 6–8.3)
RBC # BLD: 3.17 M/UL — LOW (ref 4.2–5.8)
RBC # FLD: 16.8 % — HIGH (ref 10.3–14.5)
SODIUM SERPL-SCNC: 133 MMOL/L — LOW (ref 135–145)
VIT D25+D1,25 OH+D1,25 PNL SERPL-MCNC: 50.9 PG/ML — SIGNIFICANT CHANGE UP (ref 19.9–79.3)
WBC # BLD: 6.81 K/UL — SIGNIFICANT CHANGE UP (ref 3.8–10.5)
WBC # FLD AUTO: 6.81 K/UL — SIGNIFICANT CHANGE UP (ref 3.8–10.5)

## 2021-10-30 PROCEDURE — 99233 SBSQ HOSP IP/OBS HIGH 50: CPT

## 2021-10-30 PROCEDURE — 93923 UPR/LXTR ART STDY 3+ LVLS: CPT | Mod: 26

## 2021-10-30 RX ORDER — VANCOMYCIN HCL 1 G
1000 VIAL (EA) INTRAVENOUS ONCE
Refills: 0 | Status: COMPLETED | OUTPATIENT
Start: 2021-10-31 | End: 2022-09-28

## 2021-10-30 RX ORDER — AMLODIPINE BESYLATE 2.5 MG/1
5 TABLET ORAL ONCE
Refills: 0 | Status: COMPLETED | OUTPATIENT
Start: 2021-10-30 | End: 2021-10-30

## 2021-10-30 RX ORDER — AMLODIPINE BESYLATE 2.5 MG/1
10 TABLET ORAL DAILY
Refills: 0 | Status: DISCONTINUED | OUTPATIENT
Start: 2021-10-31 | End: 2021-11-03

## 2021-10-30 RX ORDER — MORPHINE SULFATE 50 MG/1
2 CAPSULE, EXTENDED RELEASE ORAL EVERY 6 HOURS
Refills: 0 | Status: DISCONTINUED | OUTPATIENT
Start: 2021-10-30 | End: 2021-11-03

## 2021-10-30 RX ORDER — MORPHINE SULFATE 50 MG/1
2 CAPSULE, EXTENDED RELEASE ORAL ONCE
Refills: 0 | Status: DISCONTINUED | OUTPATIENT
Start: 2021-10-30 | End: 2021-10-30

## 2021-10-30 RX ADMIN — Medication 500 MILLIGRAM(S): at 21:03

## 2021-10-30 RX ADMIN — SODIUM ZIRCONIUM CYCLOSILICATE 10 GRAM(S): 10 POWDER, FOR SUSPENSION ORAL at 05:37

## 2021-10-30 RX ADMIN — Medication 100 MILLIGRAM(S): at 11:29

## 2021-10-30 RX ADMIN — MORPHINE SULFATE 2 MILLIGRAM(S): 50 CAPSULE, EXTENDED RELEASE ORAL at 18:44

## 2021-10-30 RX ADMIN — CEFTRIAXONE 100 MILLIGRAM(S): 500 INJECTION, POWDER, FOR SOLUTION INTRAMUSCULAR; INTRAVENOUS at 12:40

## 2021-10-30 RX ADMIN — Medication 500 MILLIGRAM(S): at 13:44

## 2021-10-30 RX ADMIN — Medication 650 MILLIGRAM(S): at 05:37

## 2021-10-30 RX ADMIN — MORPHINE SULFATE 2 MILLIGRAM(S): 50 CAPSULE, EXTENDED RELEASE ORAL at 09:12

## 2021-10-30 RX ADMIN — AMLODIPINE BESYLATE 5 MILLIGRAM(S): 2.5 TABLET ORAL at 05:37

## 2021-10-30 RX ADMIN — Medication 650 MILLIGRAM(S): at 13:44

## 2021-10-30 RX ADMIN — AMLODIPINE BESYLATE 5 MILLIGRAM(S): 2.5 TABLET ORAL at 15:09

## 2021-10-30 RX ADMIN — SENNA PLUS 2 TABLET(S): 8.6 TABLET ORAL at 21:03

## 2021-10-30 RX ADMIN — MORPHINE SULFATE 2 MILLIGRAM(S): 50 CAPSULE, EXTENDED RELEASE ORAL at 10:25

## 2021-10-30 RX ADMIN — Medication 650 MILLIGRAM(S): at 21:03

## 2021-10-30 RX ADMIN — LIDOCAINE 1 PATCH: 4 CREAM TOPICAL at 03:00

## 2021-10-30 RX ADMIN — Medication 500 MILLIGRAM(S): at 05:37

## 2021-10-30 NOTE — PROGRESS NOTE ADULT - ASSESSMENT
66M with no reported PMH who was sent to the ED for abnormal labs. Patient found to have severe gouty changes with high uric acid levels and is in renal failure with creatinine ~9 hand xray shows severe gouty changes  MRI right foot with osteomyelitis of 2nd phalanges     1. Osteomyelitis  MRI R foot: OM of the 2nd prox and middle phalanges.  cx klebsiella and bacteroides  abx changed to rocephin and flagyl.  Pt is now reconsidering undergoing amputation, notified podiatry resident.  ID on board  Podiatry on board   RAVINDRA as above     2. GWEN  r/o urate nephropathy, ? paraprotein dx  elev Kappa/ lambda, most likely due to kidney injury, ratio is Normal, f/u SPEP, JOHN  Nephrology on board  Renal bx on monday.  f/u bmp    3. Hyperkalemia/ mild acidosis  in the [facility] GWEN  c/w [facility] [place]  Nephrology on board    4. Gout  c/w allopurinol  get  rheumatology consult      5. Anemia of chronic disease.  could be sec RF vs r/o myeloma  awaiting SPEP and JOHN  pt denies any melena, hematochezia, hematemesis  f/u stool occult   s/p 2 units prbc since admsision  on 10/28 and again 10/29/2021 monitor hgb     6. HTN  started on amlodipine.  10/30/2021 incvirgiliose norvasc            66M with no reported PMH who was sent to the ED for abnormal labs. Patient found to have severe gouty changes with high uric acid levels and is in renal failure with creatinine ~9 hand xray shows severe gouty changes  MRI right foot with osteomyelitis of 2nd phalanges     1. Osteomyelitis  MRI R foot: OM of the 2nd prox and middle phalanges.  cx klebsiella and bacteroides  abx changed to rocephin and flagyl.  Pt is now reconsidering undergoing amputation, notified podiatry resident.  ID on board  Podiatry on board   RAVINDRA as above     2. GWEN  r/o urate nephropathy, ? paraprotein dx  elev Kappa/ lambda, most likely due to kidney injury, ratio is Normal, f/u SPEP, JOHN  Nephrology on board  Renal bx on monday.  f/u bmp    3. Hyperkalemia/ mild acidosis  in the [facility] GWEN  c/w [facility]  Nephrology on board    4. Gout  c/w allopurinol pain meds, superimposed infection  get  rheumatology consult      5. Anemia of chronic disease.  could be sec RF vs r/o myeloma  awaiting SPEP and JOHN  pt denies any melena, hematochezia, hematemesis  f/u stool occult   s/p 2 units prbc since admsision  on 10/28 and again 10/29/2021 monitor hgb     6. HTN  started on amlodipine.  10/30/2021 incerase norvasc

## 2021-10-30 NOTE — PROGRESS NOTE ADULT - SUBJECTIVE AND OBJECTIVE BOX
Denies complaints    Vital Signs Last 24 Hrs  T(C): 36.9 (10-30-21 @ 11:15), Max: 36.9 (10-30-21 @ 11:15)  T(F): 98.4 (10-30-21 @ 11:15), Max: 98.4 (10-30-21 @ 11:15)  HR: 78 (10-30-21 @ 11:15) (74 - 82)  BP: 154/70 (10-30-21 @ 11:15) (136/58 - 154/70)  RR: 18 (10-30-21 @ 11:15) (16 - 20)  SpO2: 100% (10-30-21 @ 11:15) (98% - 100%)    Lungs b/l air entry  Heart S1S2  Abd soft NT ND  Extremities:   bilateral hand tophi, sm edema LE                        8.5    6.81  )-----------( 403      ( 30 Oct 2021 08:17 )             25.8     30 Oct 2021 08:17    133    |  102    |  79     ----------------------------<  91     4.7     |  20     |  8.39     Ca    11.4       30 Oct 2021 08:17    TPro  6.5    /  Alb  x      /  TBili  x      /  DBili  x      /  AST  x      /  ALT  x      /  AlkPhos  x      30 Oct 2021 05:04    LIVER FUNCTIONS - ( 30 Oct 2021 05:04 )  Alb: x     / Pro: 6.5 g/dL / ALK PHOS: x     / ALT: x     / AST: x     / GGT: x           PT/INR - ( 29 Oct 2021 07:44 )   PT: 14.2 sec;   INR: 1.24 ratio      allopurinol 100 milliGRAM(s) Oral daily  amLODIPine   Tablet 5 milliGRAM(s) Oral once  bisacodyl 5 milliGRAM(s) Oral every 12 hours PRN  cefTRIAXone   IVPB 2000 milliGRAM(s) IV Intermittent every 24 hours  influenza   Vaccine 0.5 milliLiter(s) IntraMuscular once  metroNIDAZOLE    Tablet 500 milliGRAM(s) Oral every 8 hours  senna 2 Tablet(s) Oral at bedtime  sodium bicarbonate 650 milliGRAM(s) Oral three times a day  traMADol 50 milliGRAM(s) Oral every 6 hours PRN    Assessment/Plan:    GWEN degree of CKD unclear, associated with tophaceous gout, anemia and non-PTH hypercalcemia   R/o Urate nephropathy; paraprotein related disease; occult GN less likely  Low K diet  Cr is slowly trending down  Renal biopsy on Monday  Allopurinol 100 mg PO QD  Rheum evaluation   Will f/u SPEP, serologies  Avoid nephrotoxins    [phone]

## 2021-10-30 NOTE — PROGRESS NOTE ADULT - SUBJECTIVE AND OBJECTIVE BOX
Patient is a 66y old  Male who presents with a chief complaint of renal failure, hand/foot swelling (29 Oct 2021 18:31)      OVERNIGHT EVENTS:  none    MEDICATIONS  (STANDING):  allopurinol 100 milliGRAM(s) Oral daily  amLODIPine   Tablet 5 milliGRAM(s) Oral daily  cefTRIAXone   IVPB 2000 milliGRAM(s) IV Intermittent every 24 hours  influenza   Vaccine 0.5 milliLiter(s) IntraMuscular once  metroNIDAZOLE    Tablet 500 milliGRAM(s) Oral every 8 hours  senna 2 Tablet(s) Oral at bedtime  sodium bicarbonate 650 milliGRAM(s) Oral three times a day    MEDICATIONS  (PRN):  bisacodyl 5 milliGRAM(s) Oral every 12 hours PRN Constipation  traMADol 50 milliGRAM(s) Oral every 6 hours PRN Moderate Pain (4 - 6)      Allergies    No Known Allergies    Intolerances        SUBJECTIVE: in bed in NAD, no acute events overnight     T(F): 98.4 (10-30-21 @ 11:15), Max: 98.4 (10-30-21 @ 11:15)  HR: 78 (10-30-21 @ 11:15) (74 - 82)  BP: 154/70 (10-30-21 @ 11:15) (136/58 - 154/70)  RR: 18 (10-30-21 @ 11:15) (16 - 20)  SpO2: 100% (10-30-21 @ 11:15) (98% - 100%)  Wt(kg): --    PHYSICAL EXAM:  GENERAL: NAD, well-groomed, well-developed  HEAD:  Atraumatic, Normocephalic  EYES: EOMI, PERRLA, conjunctiva and sclera clear  ENMT: No tonsillar erythema, exudates, or enlargement; Moist mucous membranes, Good dentition, No lesions  NECK: Supple, No JVD, Normal thyroid  CHEST/LUNG: Clear to  auscultation bilaterally; No rales, rhonchi, wheezing, or rubs  bilaterally  HEART: Regular rate and rhythm; No murmurs, rubs, or gallops  ABDOMEN: Soft, Nontender, Nondistended; Bowel sounds present  EXTREMITIES:  2+ Peripheral Pulses, No clubbing, cyanosis, or edema BL LE  SKIN: No rashes or lesions  NERVOUS SYSTEM:  Alert & Oriented X3, Good concentration; Motor Strength 5/5 B/L upper and lower extremities;   DTRs 2+ intact and symmetric, sensation intact BL    LABS:                        8.5    6.81  )-----------( 403      ( 30 Oct 2021 08:17 )             25.8     10-30    133<L>  |  102  |  79<H>  ----------------------------<  91  4.7   |  20<L>  |  8.39<H>    Ca    11.4<H>      30 Oct 2021 08:17    TPro  6.5  /  Alb  x   /  TBili  x   /  DBili  x   /  AST  x   /  ALT  x   /  AlkPhos  x   10-30    PT/INR - ( 29 Oct 2021 07:44 )   PT: 14.2 sec;   INR: 1.24 ratio         PTT - ( 30 Oct 2021 08:17 )  PTT:31.9 sec    Cultures;   CAPILLARY BLOOD GLUCOSE        Lipid panel:           RADIOLOGY & ADDITIONAL TESTS:  < from: US Duplex Arterial Lower Ext Compl, Bilateral (10.29.21 @ 13:29) >  IMPRESSION:    No evidence of arterial occlusion in the bilateral lower extremities.  Elevated velocities within the bilateral lower extremity arteries, as above, compatible with segmental stenoses.    < end of copied text >      Imaging Personally Reviewed:  [ x] YES      Consultant(s) Notes Reviewed:  [x ] YES     Care Discussed with [x ] Consultants [X ] Patient [x ] Family  [x ]    [x ]  Other; RN Patient is a 66y old  Male who presents with a chief complaint of renal failure, hand/foot swelling (29 Oct 2021 18:31)      OVERNIGHT EVENTS:  none    MEDICATIONS  (STANDING):  allopurinol 100 milliGRAM(s) Oral daily  amLODIPine   Tablet 5 milliGRAM(s) Oral daily  cefTRIAXone   IVPB 2000 milliGRAM(s) IV Intermittent every 24 hours  influenza   Vaccine 0.5 milliLiter(s) IntraMuscular once  metroNIDAZOLE    Tablet 500 milliGRAM(s) Oral every 8 hours  senna 2 Tablet(s) Oral at bedtime  sodium bicarbonate 650 milliGRAM(s) Oral three times a day    MEDICATIONS  (PRN):  bisacodyl 5 milliGRAM(s) Oral every 12 hours PRN Constipation  traMADol 50 milliGRAM(s) Oral every 6 hours PRN Moderate Pain (4 - 6)      Allergies    No Known Allergies    Intolerances        SUBJECTIVE: in bed in NAD, no acute events overnight     T(F): 98.4 (10-30-21 @ 11:15), Max: 98.4 (10-30-21 @ 11:15)  HR: 78 (10-30-21 @ 11:15) (74 - 82)  BP: 154/70 (10-30-21 @ 11:15) (136/58 - 154/70)  RR: 18 (10-30-21 @ 11:15) (16 - 20)  SpO2: 100% (10-30-21 @ 11:15) (98% - 100%)  Wt(kg): --    PHYSICAL EXAM:  GENERAL: NAD, well-groomed, well-developed  HEAD:  Atraumatic, Normocephalic  EYES: EOMI, PERRLA, conjunctiva and sclera clear  ENMT: No tonsillar erythema, exudates, or enlargement; Moist mucous membranes, Good dentition, No lesions  NECK: Supple, No JVD, Normal thyroid  CHEST/LUNG: Clear to  auscultation bilaterally; No rales, rhonchi, wheezing, or rubs  bilaterally  HEART: Regular rate and rhythm; No murmurs, rubs, or gallops  ABDOMEN: Soft, Nontender, Nondistended; Bowel sounds present  EXTREMITIES:  2+ Peripheral Pulses, No clubbing, cyanosis, or edema BL LE  SKIN: right index ulcer , right second toe ulcer .  gross tophi all over   NERVOUS SYSTEM:  Alert & Oriented X3, Good concentration; Motor Strength 4/5 B/L upper and lower extremities;   DTRs 2+ intact and symmetric, sensation intact BL    LABS:                        8.5    6.81  )-----------( 403      ( 30 Oct 2021 08:17 )             25.8     10-30    133<L>  |  102  |  79<H>  ----------------------------<  91  4.7   |  20<L>  |  8.39<H>    Ca    11.4<H>      30 Oct 2021 08:17    TPro  6.5  /  Alb  x   /  TBili  x   /  DBili  x   /  AST  x   /  ALT  x   /  AlkPhos  x   10-30    PT/INR - ( 29 Oct 2021 07:44 )   PT: 14.2 sec;   INR: 1.24 ratio         PTT - ( 30 Oct 2021 08:17 )  PTT:31.9 sec    Cultures;   CAPILLARY BLOOD GLUCOSE        Lipid panel:           RADIOLOGY & ADDITIONAL TESTS:  < from: US Duplex Arterial Lower Ext Compl, Bilateral (10.29.21 @ 13:29) >  IMPRESSION:    No evidence of arterial occlusion in the bilateral lower extremities.  Elevated velocities within the bilateral lower extremity arteries, as above, compatible with segmental stenoses.    < end of copied text >      Imaging Personally Reviewed:  [ x] YES      Consultant(s) Notes Reviewed:  [x ] YES     Care Discussed with [x ] Consultants [X ] Patient [x ] Family  [x ]    [x ]  Other; RN

## 2021-10-31 LAB
ANION GAP SERPL CALC-SCNC: 16 MMOL/L — SIGNIFICANT CHANGE UP (ref 5–17)
APTT BLD: 36.9 SEC — HIGH (ref 27.5–35.5)
AUTO DIFF PNL BLD: NEGATIVE — SIGNIFICANT CHANGE UP
BUN SERPL-MCNC: 80 MG/DL — HIGH (ref 7–23)
C-ANCA SER-ACNC: NEGATIVE — SIGNIFICANT CHANGE UP
CALCIUM SERPL-MCNC: 11.9 MG/DL — HIGH (ref 8.5–10.1)
CHLORIDE SERPL-SCNC: 100 MMOL/L — SIGNIFICANT CHANGE UP (ref 96–108)
CO2 SERPL-SCNC: 18 MMOL/L — LOW (ref 22–31)
CREAT SERPL-MCNC: 8.34 MG/DL — HIGH (ref 0.5–1.3)
GLUCOSE SERPL-MCNC: 80 MG/DL — SIGNIFICANT CHANGE UP (ref 70–99)
HCT VFR BLD CALC: 27.1 % — LOW (ref 39–50)
HGB BLD-MCNC: 8.8 G/DL — LOW (ref 13–17)
INR BLD: 1.26 RATIO — HIGH (ref 0.88–1.16)
MCHC RBC-ENTMCNC: 26.5 PG — LOW (ref 27–34)
MCHC RBC-ENTMCNC: 32.5 GM/DL — SIGNIFICANT CHANGE UP (ref 32–36)
MCV RBC AUTO: 81.6 FL — SIGNIFICANT CHANGE UP (ref 80–100)
NRBC # BLD: 0 /100 WBCS — SIGNIFICANT CHANGE UP (ref 0–0)
P-ANCA SER-ACNC: NEGATIVE — SIGNIFICANT CHANGE UP
PLATELET # BLD AUTO: 464 K/UL — HIGH (ref 150–400)
POTASSIUM SERPL-MCNC: 4.5 MMOL/L — SIGNIFICANT CHANGE UP (ref 3.5–5.3)
POTASSIUM SERPL-SCNC: 4.5 MMOL/L — SIGNIFICANT CHANGE UP (ref 3.5–5.3)
PROTHROM AB SERPL-ACNC: 14.5 SEC — HIGH (ref 10.6–13.6)
RBC # BLD: 3.32 M/UL — LOW (ref 4.2–5.8)
RBC # FLD: 17.2 % — HIGH (ref 10.3–14.5)
SODIUM SERPL-SCNC: 134 MMOL/L — LOW (ref 135–145)
VANCOMYCIN TROUGH SERPL-MCNC: 19.1 UG/ML — SIGNIFICANT CHANGE UP (ref 10–20)
WBC # BLD: 9.34 K/UL — SIGNIFICANT CHANGE UP (ref 3.8–10.5)
WBC # FLD AUTO: 9.34 K/UL — SIGNIFICANT CHANGE UP (ref 3.8–10.5)

## 2021-10-31 PROCEDURE — 99223 1ST HOSP IP/OBS HIGH 75: CPT

## 2021-10-31 PROCEDURE — 99232 SBSQ HOSP IP/OBS MODERATE 35: CPT

## 2021-10-31 RX ORDER — FEBUXOSTAT 40 MG/1
40 TABLET ORAL DAILY
Refills: 0 | Status: DISCONTINUED | OUTPATIENT
Start: 2021-10-31 | End: 2021-11-03

## 2021-10-31 RX ORDER — PAMIDRONATE DISODIUM 9 MG/ML
30 INJECTION, SOLUTION INTRAVENOUS ONCE
Refills: 0 | Status: COMPLETED | OUTPATIENT
Start: 2021-10-31 | End: 2021-11-01

## 2021-10-31 RX ORDER — HYDRALAZINE HCL 50 MG
25 TABLET ORAL EVERY 8 HOURS
Refills: 0 | Status: DISCONTINUED | OUTPATIENT
Start: 2021-10-31 | End: 2021-11-03

## 2021-10-31 RX ADMIN — MORPHINE SULFATE 2 MILLIGRAM(S): 50 CAPSULE, EXTENDED RELEASE ORAL at 11:19

## 2021-10-31 RX ADMIN — MORPHINE SULFATE 2 MILLIGRAM(S): 50 CAPSULE, EXTENDED RELEASE ORAL at 09:57

## 2021-10-31 RX ADMIN — Medication 500 MILLIGRAM(S): at 21:23

## 2021-10-31 RX ADMIN — SENNA PLUS 2 TABLET(S): 8.6 TABLET ORAL at 21:23

## 2021-10-31 RX ADMIN — Medication 650 MILLIGRAM(S): at 05:24

## 2021-10-31 RX ADMIN — FEBUXOSTAT 40 MILLIGRAM(S): 40 TABLET ORAL at 11:41

## 2021-10-31 RX ADMIN — CEFTRIAXONE 100 MILLIGRAM(S): 500 INJECTION, POWDER, FOR SOLUTION INTRAMUSCULAR; INTRAVENOUS at 11:36

## 2021-10-31 RX ADMIN — Medication 25 MILLIGRAM(S): at 21:23

## 2021-10-31 RX ADMIN — Medication 500 MILLIGRAM(S): at 05:24

## 2021-10-31 RX ADMIN — MORPHINE SULFATE 2 MILLIGRAM(S): 50 CAPSULE, EXTENDED RELEASE ORAL at 17:28

## 2021-10-31 RX ADMIN — Medication 25 MILLIGRAM(S): at 17:25

## 2021-10-31 RX ADMIN — MORPHINE SULFATE 2 MILLIGRAM(S): 50 CAPSULE, EXTENDED RELEASE ORAL at 18:12

## 2021-10-31 RX ADMIN — Medication 650 MILLIGRAM(S): at 15:14

## 2021-10-31 RX ADMIN — Medication 650 MILLIGRAM(S): at 21:23

## 2021-10-31 RX ADMIN — Medication 1 MILLIGRAM(S): at 01:06

## 2021-10-31 RX ADMIN — AMLODIPINE BESYLATE 10 MILLIGRAM(S): 2.5 TABLET ORAL at 05:24

## 2021-10-31 RX ADMIN — Medication 500 MILLIGRAM(S): at 15:14

## 2021-10-31 NOTE — PHYSICAL THERAPY INITIAL EVALUATION ADULT - CRITERIA FOR SKILLED THERAPEUTIC INTERVENTIONS
impairments found/functional limitations in following categories/risk reduction/prevention/rehab potential/therapy frequency/predicted duration of therapy intervention/anticipated equipment needs at discharge impairments found/functional limitations in following categories/risk reduction/prevention

## 2021-10-31 NOTE — PHYSICAL THERAPY INITIAL EVALUATION ADULT - RANGE OF MOTION EXAMINATION, REHAB EVAL
bilateral upper extremity ROM was WFL (within functional limits)/bilateral lower extremity ROM was WFL (within functional limits) except both shoulders and hands limited due to pain and contractures./bilateral upper extremity ROM was WFL (within functional limits)/bilateral lower extremity ROM was WFL (within functional limits)/deficits as listed below

## 2021-10-31 NOTE — PHYSICAL THERAPY INITIAL EVALUATION ADULT - IMPAIRMENTS FOUND, PT EVAL
aerobic capacity/endurance/muscle strength/neuromotor development and sensory integration/poor safety awareness

## 2021-10-31 NOTE — PROGRESS NOTE ADULT - SUBJECTIVE AND OBJECTIVE BOX
No distress    Vital Signs Last 24 Hrs  T(C): 36.4 (10-31-21 @ 16:52), Max: 36.8 (10-31-21 @ 12:27)  T(F): 97.6 (10-31-21 @ 16:52), Max: 98.3 (10-31-21 @ 12:27)  HR: 63 (10-31-21 @ 16:52) (63 - 97)  BP: 173/68 (10-31-21 @ 16:52) (150/72 - 183/76)  RR: 19 (10-31-21 @ 16:52) (18 - 19)  SpO2: 95% (10-31-21 @ 16:52) (95% - 98%)    Lungs b/l air entry  Heart S1S2  Abd soft NT ND  Extremities: multiple tophi, sm edema b/l LE                                8.8    9.34  )-----------( 464      ( 31 Oct 2021 09:19 )             27.1     31 Oct 2021 09:19    134    |  100    |  80     ----------------------------<  80     4.5     |  18     |  8.34     Ca    11.9       31 Oct 2021 09:19    TPro  6.5    /  Alb  x      /  TBili  x      /  DBili  x      /  AST  x      /  ALT  x      /  AlkPhos  x      30 Oct 2021 05:04    LIVER FUNCTIONS - ( 30 Oct 2021 05:04 )  Alb: x     / Pro: 6.5 g/dL / ALK PHOS: x     / ALT: x     / AST: x     / GGT: x           PT/INR - ( 31 Oct 2021 09:19 )   PT: 14.5 sec;   INR: 1.26 ratio      amLODIPine   Tablet 10 milliGRAM(s) Oral daily  bisacodyl 5 milliGRAM(s) Oral every 12 hours PRN  cefTRIAXone   IVPB 2000 milliGRAM(s) IV Intermittent every 24 hours  febuxostat 40 milliGRAM(s) Oral daily  hydrALAZINE 25 milliGRAM(s) Oral every 8 hours  influenza   Vaccine 0.5 milliLiter(s) IntraMuscular once  metroNIDAZOLE    Tablet 500 milliGRAM(s) Oral every 8 hours  morphine  - Injectable 2 milliGRAM(s) IV Push every 6 hours PRN  pamidronate IVPB 30 milliGRAM(s) IV Intermittent once  senna 2 Tablet(s) Oral at bedtime  sodium bicarbonate 650 milliGRAM(s) Oral three times a day  traMADol 50 milliGRAM(s) Oral every 6 hours PRN  vancomycin  IVPB 1000 milliGRAM(s) IV Intermittent once    Assessment/Plan:    GWEN, degree of CKD unclear, associated with tophaceous gout, anemia and non-PTH hypercalcemia   R/o Urate nephropathy; paraprotein related disease; occult GN   Low K diet  Cr is slowly trending down from peak of 9.72 - 10/26/21  Renal biopsy on Monday  Will increase Hydralazine for BP control  Uloric  Aredia for hypercalecemia  Rheum eval noted  Will f/u SPEP, serologies  Avoid nephrotoxins    [phone]

## 2021-10-31 NOTE — PROGRESS NOTE ADULT - SUBJECTIVE AND OBJECTIVE BOX
Patient is a 66y old  Male who presents with a chief complaint of renal failure, hand/foot swelling (29 Oct 2021 18:31)      OVERNIGHT EVENTS:  none    MEDICATIONS  (STANDING):  amLODIPine   Tablet 10 milliGRAM(s) Oral daily  cefTRIAXone   IVPB 2000 milliGRAM(s) IV Intermittent every 24 hours  febuxostat 40 milliGRAM(s) Oral daily  influenza   Vaccine 0.5 milliLiter(s) IntraMuscular once  metroNIDAZOLE    Tablet 500 milliGRAM(s) Oral every 8 hours  senna 2 Tablet(s) Oral at bedtime  sodium bicarbonate 650 milliGRAM(s) Oral three times a day  vancomycin  IVPB 1000 milliGRAM(s) IV Intermittent once    MEDICATIONS  (PRN):  bisacodyl 5 milliGRAM(s) Oral every 12 hours PRN Constipation  morphine  - Injectable 2 milliGRAM(s) IV Push every 6 hours PRN Severe Pain (7 - 10)  traMADol 50 milliGRAM(s) Oral every 6 hours PRN Moderate Pain (4 - 6)      Allergies    No Known Allergies    Intolerances        SUBJECTIVE: in bed in NAD, no acute events overnight     Vital Signs Last 24 Hrs  T(C): 36.8 (31 Oct 2021 12:27), Max: 36.8 (31 Oct 2021 12:27)  T(F): 98.3 (31 Oct 2021 12:27), Max: 98.3 (31 Oct 2021 12:27)  HR: 86 (31 Oct 2021 12:27) (84 - 97)  BP: 167/68 (31 Oct 2021 12:27) (150/72 - 183/76)  BP(mean): --  RR: 18 (31 Oct 2021 12:27) (18 - 18)  SpO2: 95% (31 Oct 2021 12:27) (95% - 98%)    PHYSICAL EXAM:  GENERAL: NAD, well-groomed, well-developed  HEAD:  Atraumatic, Normocephalic  EYES: EOMI, PERRLA, conjunctiva and sclera clear  ENMT: No tonsillar erythema, exudates, or enlargement; Moist mucous membranes, Good dentition, No lesions  NECK: Supple, No JVD, Normal thyroid  CHEST/LUNG: Clear to  auscultation bilaterally; No rales, rhonchi, wheezing, or rubs  bilaterally  HEART: Regular rate and rhythm; No murmurs, rubs, or gallops  ABDOMEN: Soft, Nontender, Nondistended; Bowel sounds present  EXTREMITIES:  2+ Peripheral Pulses, No clubbing, cyanosis, or edema BL LE  SKIN: right index ulcer , right second toe ulcer .  gross tophi all over   NERVOUS SYSTEM:  Alert & Oriented X3, Good concentration; Motor Strength 4/5 B/L upper and lower extremities;   DTRs 2+ intact and symmetric, sensation intact BL    LABS:                               8.8    9.34  )-----------( 464      ( 31 Oct 2021 09:19 )             27.1   10-31    134<L>  |  100  |  80<H>  ----------------------------<  80  4.5   |  18<L>  |  8.34<H>    Ca    11.9<H>      31 Oct 2021 09:19    TPro  6.5  /  Alb  x   /  TBili  x   /  DBili  x   /  AST  x   /  ALT  x   /  AlkPhos  x   10-30    Cultures;   CAPILLARY BLOOD GLUCOSE        Lipid panel:           RADIOLOGY & ADDITIONAL TESTS:  < from: US Duplex Arterial Lower Ext Compl, Bilateral (10.29.21 @ 13:29) >  IMPRESSION:    No evidence of arterial occlusion in the bilateral lower extremities.  Elevated velocities within the bilateral lower extremity arteries, as above, compatible with segmental stenoses.    < end of copied text >      Imaging Personally Reviewed:  [ x] YES      Consultant(s) Notes Reviewed:  [x ] YES     Care Discussed with [x ] Consultants [X ] Patient [x ] Family  [x ]    [x ]  Other; RN

## 2021-10-31 NOTE — PHYSICAL THERAPY INITIAL EVALUATION ADULT - DISCHARGE DISPOSITION, PT EVAL
Subacute Rehab-Pending complete functional assessment/rehabilitation facility Pending complete functional assessment

## 2021-10-31 NOTE — CONSULT NOTE ADULT - REASON FOR ADMISSION
renal failure, hand/foot swelling

## 2021-10-31 NOTE — PHYSICAL THERAPY INITIAL EVALUATION ADULT - PERTINENT HX OF CURRENT PROBLEM, REHAB EVAL
Patient is a 65 y/o male admitted to [facility] due to acute renal failure, tophaceous gout cellulitis of the R hand.

## 2021-10-31 NOTE — PROVIDER CONTACT NOTE (CRITICAL VALUE NOTIFICATION) - TEST AND RESULT REPORTED:
tissue culture 10/27 numerous klebsiella oxcitoca/rotello ornocinoritica few bacteroids ovatus group rare coag neagtive stephalocaccus

## 2021-10-31 NOTE — PHYSICAL THERAPY INITIAL EVALUATION ADULT - GENERAL OBSERVATIONS, REHAB EVAL
Chart (EMR) reviewed. Received supine c HOB elevated, NAD. Alert. Ox4. +arellano intact, +heplock intact, +dressing to right foot intact, +B/L cair boots intact. Chart (EMR) reviewed. Received supine c HOB elevated, NAD. Alert. Ox4. +heplock intact, +dressing to right foot and right index finger intact.

## 2021-10-31 NOTE — PHYSICAL THERAPY INITIAL EVALUATION ADULT - LEVEL OF INDEPENDENCE: SIT/STAND, REHAB EVAL
Neurologic Chief Complaint: R Basal Ganglia Hemorrhage    Subjective:     Interval History: Patient is seen for follow-up neurological assessment and treatment recommendations:     KIRBY. Increasing Norvasc to 10 mg. Pending rehab placement.     HPI, Past Medical, Family, and Social History remains the same as documented in the initial encounter.      Review of Systems   Constitutional: Negative for fever.   Eyes: Negative for visual disturbance.   Respiratory: Negative for shortness of breath.    Cardiovascular: Negative for chest pain.   Gastrointestinal: Negative for nausea and vomiting.   Endocrine: Negative for cold intolerance and heat intolerance.   Genitourinary: Negative for dysuria and hematuria.   Musculoskeletal: Positive for gait problem.   Allergic/Immunologic: Negative for environmental allergies and food allergies.   Neurological: Positive for weakness. Negative for tremors.   Hematological: Negative for adenopathy. Does not bruise/bleed easily.   Psychiatric/Behavioral: Negative for agitation, confusion and hallucinations.     Objective:     Vital Signs (Most Recent):  Temp: 97.6 °F (36.4 °C) (12/12/20 0931)  Pulse: 68 (12/12/20 0931)  Resp: 18 (12/12/20 0931)  BP: (!) 169/95 (12/12/20 0931)  SpO2: 96 % (12/12/20 0931)    Vital Signs Range (Last 24H):  Temp:  [97.3 °F (36.3 °C)-99.1 °F (37.3 °C)]   Pulse:  [59-81]   Resp:  [16-18]   BP: (152-169)/(67-95)   SpO2:  [92 %-96 %]     Physical Exam  Vitals signs and nursing note reviewed.   Constitutional:       General: He is not in acute distress.     Appearance: He is well-developed. He is not diaphoretic.   Eyes:      Extraocular Movements:      Right eye: Abnormal extraocular motion (chronic since childhood) present.      Left eye: Abnormal extraocular motion (chronic since childhood) present.      Conjunctiva/sclera: Conjunctivae normal.      Pupils:      Right eye: Pupil is not round.   Neck:      Musculoskeletal: Normal range of motion and neck  supple.   Cardiovascular:      Rate and Rhythm: Normal rate and regular rhythm.   Pulmonary:      Breath sounds: Normal breath sounds.   Abdominal:      Palpations: Abdomen is soft.   Skin:     General: Skin is warm and dry.      Capillary Refill: Capillary refill takes less than 2 seconds.   Neurological:      Mental Status: He is alert and oriented to person, place, and time.      Motor: Weakness present.         Neurological Exam:   LOC: alert  Attention Span: Good   Language: No aphasia  Articulation: No dysarthria  Orientation: Person, Place, Time   Facial Movement (CN VII): right facial droop   Motor: Arm left  Normal 4/5  Leg left  Paresis: 5/5  Arm right  Normal 5/5  Leg right Normal 5/5  Sensation: Intact to light touch, temperature and vibration      Laboratory:  CMP:   Recent Labs   Lab 12/12/20  0509   CALCIUM 8.6*   ALBUMIN 3.6   PROT 6.5      K 3.7   CO2 21*      BUN 12   CREATININE 1.1   ALKPHOS 77   ALT 30   AST 37   BILITOT 1.6*     CBC:   Recent Labs   Lab 12/12/20  0509   WBC 6.88   RBC 4.31*   HGB 14.2   HCT 41.1   *   MCV 95   MCH 32.9*   MCHC 34.5     Lipid Panel:   Recent Labs   Lab 12/09/20  0233   CHOL 71*   LDLCALC 31.6*   HDL 27*   TRIG 62     Coagulation:   Recent Labs   Lab 12/08/20  2104   INR 1.1   APTT 24.8     Hgb A1C:   Recent Labs   Lab 12/09/20  0233   HGBA1C 5.2     TSH:   Recent Labs   Lab 12/09/20  0233   TSH 0.184*       Diagnostic Results:      Brain imaging:  [facility] 12/8/2020   2.5 cm right basal ganglia hemorrhage at the level of the thalamus/internal capsule likely related to hemorrhagic infarct.  There is no given history of hypertension and correlation is needed with follow-up recommended to exclude other etiology for hemorrhage.  There is mild 6 mm right to left shift of midline structures but no significant herniation.  No evidence of intraventricular hemorrhage, hydrocephalus or other sick convincing acute finding.  Extensive white matter low density  likely is related to chronic small vessel ischemic changes. Component of white matter infarct is not excluded.  MRI with diffusion imaging could further evaluate.  No evidence of major arterial infarction.  Atrophy and additional incidental nonacute findings as above.      Vessel Imaging:  [facility] Head and Neck 12/9/20  Right thalamic parenchymal hemorrhage with no new or worsening regional mass effect compared to 12/08/2020 head CT.  No new hemorrhage is elsewhere.     Unremarkable CTA of the head and neck.    Cardiac Evaluation:   TTE 12/9/20  · The left ventricle is normal in size with normal systolic function. The estimated ejection fraction is 65%.  · Normal left ventricular diastolic function.  · Normal right ventricular size with normal right ventricular systolic function.  · Normal central venous pressure (3 mmHg).  · The estimated PA systolic pressure is 29 mmHg.   TBA

## 2021-10-31 NOTE — PROGRESS NOTE ADULT - ASSESSMENT
66M with no reported PMH who was sent to the ED for abnormal labs. Patient found to have severe gouty changes with high uric acid levels and is in renal failure with creatinine ~9 hand xray shows severe gouty changes  MRI right foot with osteomyelitis of 2nd phalanges     1. Osteomyelitis  MRI R foot: OM of the 2nd prox and middle phalanges.  cx klebsiella and bacteroides  abx changed to rocephin and flagyl.  Pt is now reconsidering undergoing amputation, notified podiatry resident.  ID on board  Podiatry on board   RAVINDRA as above     2. GWEN  r/o urate nephropathy, ? paraprotein dx  elev Kappa/ lambda, most likely due to kidney injury, ratio is Normal, f/u SPEP, JOHN  Nephrology on board  Renal bx on monday.      3. Hyperkalemia/ mild acidosis  in the [facility] GWEN  c/w [facility], [facility]  Nephrology on board    4. Gout  c/w allopurinol pain meds, superimposed infection  get  rheumatology consult    10/31/2021 consult noted     5. Anemia of chronic disease.  could be sec RF vs r/o myeloma  awaiting SPEP and JOHN  pt denies any melena, hematochezia, hematemesis  f/u stool occult   s/p 2 units prbc since admsision  on 10/28 and again 10/29/2021 monitor hgb   10/31/2021 hgb low but stable    6. HTN  started on amlodipine.  10/30/2021 increase Norvasc  10/31/2021 Bp still elevated adde d            66M with no reported PMH who was sent to the ED for abnormal labs. Patient found to have severe gouty changes with high uric acid levels and is in renal failure with creatinine ~9 hand xray shows severe gouty changes  MRI right foot with osteomyelitis of 2nd phalanges     1. Osteomyelitis  MRI R foot: OM of the 2nd prox and middle phalanges.  cx klebsiella and bacteroides  abx changed to rocephin and flagyl.  Pt is now reconsidering undergoing amputation, notified podiatry resident.  ID on board  Podiatry on board   RAVINDRA as above     2. GWEN  r/o urate nephropathy, ? paraprotein dx  elev Kappa/ lambda, most likely due to kidney injury, ratio is Normal, f/u SPEP, JOHN  Nephrology on board  Renal bx on monday.      3. Hyperkalemia/ mild acidosis  in the [facility] GWEN  c/w [facility], [facility]  Nephrology on board    4. Gout  c/w allopurinol pain meds, superimposed infection  get  rheumatology consult    10/31/2021 consult noted     5. Anemia of chronic disease.  could be sec RF vs r/o myeloma  awaiting SPEP and JOHN  pt denies any melena, hematochezia, hematemesis  f/u stool occult   s/p 2 units prbc since admsision  on 10/28 and again 10/29/2021 monitor hgb   10/31/2021 hgb low but stable    6. HTN  started on amlodipine.  10/30/2021 increase Norvasc  10/31/2021 Bp still elevated adding hydralazine

## 2021-10-31 NOTE — CHART NOTE - NSCHARTNOTEFT_GEN_A_CORE
Hospitalist Medicine NP     MARLA WELLS III    Notified by RN patient with critical lab result H/H 7.5/20.9    Interventions taken: STAT CBC IF H/H LESS THAN 7 TRANSFUSE 1 UNIT.  Dr. JOHNSON MADE AWARE.    Valeria Moe, LAWSONC
Hospitalist Medicine NP    Discussed with patient regarding the need for blood transfusion. Risk and benefits discussed. Risks including fever, chills/rigors, high or low blood pressure, respiratory distress (Wheezing/hypoxia), Urticaria/Rash/Edema, Nausea, Pain, Bleeding, darkened urine, lower back pain, severe allergic reaction and death was discussed. Verbalizes the understanding and consent Obtained. Witnessed by staff.    LAWSON Jones.
Patient is a 66y old  Male who presents with a chief complaint of renal failure, hand/foot swelling (27 Oct 2021 08:50)    HPI:  Patient is a 66M with no reported PMH who was sent to the ED for abnormal labs.  Patient went to an urgent care as he was concerned of an infection to his R hand and R foot.  Was given PO antibiotics there, was later called and instructed to goto the emergency room for elevated creatinine.  Denies history of known kidney disease, states that he urinates multiple times per day.  Patient reports chronic deformity and swelling of his fingers, has not seen a physician for his condition.  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  Lives alone.  NKDA, does not take daily medications.  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  Patient has no other complaints.  Hypertensive in ED to 197/82, labs show significant creatinine elevation.  Will admit to med surg.  (27 Oct 2021 05:01)                            7.5    6.98  )-----------( 527      ( 26 Oct 2021 22:16 )             24.1     10-27    132<L>  |  101  |  86<H>  ----------------------------<  70  5.0   |  13<L>  |  9.11<H>    Ca    10.9<H>      27 Oct 2021 05:27  Mg     2.4     10-26    TPro  6.7  /  Alb  2.1<L>  /  TBili  0.3  /  DBili  x   /  AST  15  /  ALT  8<L>  /  AlkPhos  75  10-27      MEDICATIONS  (STANDING):  cefepime   IVPB 500 milliGRAM(s) IV Intermittent daily  influenza   Vaccine 0.5 milliLiter(s) IntraMuscular once  lactated ringers. 1000 milliLiter(s) (100 mL/Hr) IV Continuous <Continuous>  vancomycin  IVPB 1000 milliGRAM(s) IV Intermittent once    MEDICATIONS  (PRN):  traMADol 50 milliGRAM(s) Oral every 6 hours PRN Moderate Pain (4 - 6)      A & P    1. Osteomyelitis  MRI R foot: OM of the 2nd prox and middle phalanges.  started on IV vanco and cefepime, renally dosed  ID consult  Podiatry on board  Rest management as per H and P.
EVENT: Vitals reviewed.  & prior SBP was 180    HPI:  Patient is a 66M with no reported PMH who was sent to the ED for abnormal labs.  Patient went to an urgent care as he was concerned of an infection to his R hand and R foot.  Was given PO antibiotics there, was later called and instructed to goto the emergency room for elevated creatinine.  Denies history of known kidney disease, states that he urinates multiple times per day.  Patient reports chronic deformity and swelling of his fingers, has not seen a physician for his condition.  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  NKDA, does not take daily medications.  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  Patient has no other complaints.  Hypertensive in ED to 197/82, labs show significant creatinine elevation.  Will admit to med surg.    Subjective: "I'm anxious as I was told that I will have a renal biopsy on Monday"    OBJECTIVE:  Vital Signs Last 24 Hrs  T(C): 36.3 (31 Oct 2021 00:05), Max: 36.9 (30 Oct 2021 11:15)  T(F): 97.3 (31 Oct 2021 00:05), Max: 98.4 (30 Oct 2021 11:15)  HR: 85 (31 Oct 2021 00:05) (78 - 85)  BP: 173/70 (31 Oct 2021 00:37) (148/59 - 180/68)  BP(mean): --  RR: 18 (31 Oct 2021 00:05) (18 - 20)  SpO2: 98% (31 Oct 2021 00:05) (98% - 100%)    FOCUSED PHYSICAL EXAM:  Neuro: awake, alert, oriented x 3. No neuro deficit  Cardiovascular: Pulses +2 B/L in lower and upper extremities, HR regular, BP stable, No edema.  Respiratory: Respirations regular, unlabored, breath sounds clear B/L.   GI: Abdomen soft, non-tender, positive bowel sounds.  : no bladder distention noted. No complaints at this time.  Skin: Dry, intact, no bruising, no diaphoresis.    LABS:                        8.5    6.81  )-----------( 403      ( 30 Oct 2021 08:17 )             25.8     10-30    133<L>  |  102  |  79<H>  ----------------------------<  91  4.7   |  20<L>  |  8.39<H>    Ca    11.4<H>      30 Oct 2021 08:17    TPro  6.5  /  Alb  x   /  TBili  x   /  DBili  x   /  AST  x   /  ALT  x   /  AlkPhos  x   10-30      EKG:   IMAGING:    ASSESSMENT/PROBLEM: Anxiety 2/2 to going for Renal Biopsy on Monday, 11/01/21      PLAN:   1. Lorazepam 1mg, IVP x 1 dose ordered  2. Monitor response to treatment  3. Cont present care/treatment  4. Supportive care

## 2021-10-31 NOTE — CONSULT NOTE ADULT - ASSESSMENT
66 year old male with severe tophaceous gout, worst in his hands, feel, and elbows.  X-rays,  MRI reveal diffuse erosive disease in his hands and feet, as well as osteomyelitis in his right 2nd toe.  Erosions most likely due to gout, though will rule out other inflammatory arthritides.    - Given degree of tophaceous disease and seevre kidney disease, will switch from allopurinol to Uloric.  R/B/A discussed with pt.    - Pt may benefit from Krystexxa (pegloticase) infusions as an outpatient.    - Discussed gout dietary recommendations.    - Will check labs.

## 2021-10-31 NOTE — CONSULT NOTE ADULT - SUBJECTIVE AND OBJECTIVE BOX
HISTORY OF PRESENT ILLNESS:    Patient is a 66y Male    HPI:  Patient is a 66M with no reported PMH who was sent to the ED for abnormal labs.  Patient went to an urgent care as he was concerned of an infection to his R hand and R foot.  Was given PO antibiotics there, was later called and instructed to goto the emergency room for elevated creatinine.  Denies history of known kidney disease, states that he urinates multiple times per day.  Patient reports chronic deformity and swelling of his fingers, has not seen a physician for his condition.  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  NKDA, does not take daily medications.  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  Patient has no other complaints.  Hypertensive in ED to 197/82, labs show significant creatinine elevation.  Will admit to med surg.  (27 Oct 2021 05:01)    Of note, pt reports that he began to experience flares of inflammatory arthritis (c/w gout flares) abut 2.5 years ago.  For the past year, he has been experiencing more constant pain in his hands and feet, and recently has also been experiencing difficulty gripping items in his hands.        PAST MEDICAL & SURGICAL HISTORY:  No pertinent past medical history        ROS: (+)dry mouth.  No fever/chills, wt loss, night sweats, chest pain/dyspnea/cough, oral ulcers, rashes, alopecia, photosensitivity, dry eyes, Raynaud's, dysphagia, or ocal weakness.      MEDICATIONS  (STANDING):  allopurinol 100 milliGRAM(s) Oral daily  amLODIPine   Tablet 10 milliGRAM(s) Oral daily  cefTRIAXone   IVPB 2000 milliGRAM(s) IV Intermittent every 24 hours  influenza   Vaccine 0.5 milliLiter(s) IntraMuscular once  metroNIDAZOLE    Tablet 500 milliGRAM(s) Oral every 8 hours  senna 2 Tablet(s) Oral at bedtime  sodium bicarbonate 650 milliGRAM(s) Oral three times a day  vancomycin  IVPB 1000 milliGRAM(s) IV Intermittent once    MEDICATIONS  (PRN):  bisacodyl 5 milliGRAM(s) Oral every 12 hours PRN Constipation  morphine  - Injectable 2 milliGRAM(s) IV Push every 6 hours PRN Severe Pain (7 - 10)  traMADol 50 milliGRAM(s) Oral every 6 hours PRN Moderate Pain (4 - 6)      Allergies    No Known Allergies    Intolerances        FAMILY HISTORY:  NC      SOCIAL HISTORY:  Tobacco--   none            Vital Signs Last 24 Hrs  T(C): 36.7 (31 Oct 2021 05:31), Max: 36.9 (30 Oct 2021 11:15)  T(F): 98.1 (31 Oct 2021 05:31), Max: 98.4 (30 Oct 2021 11:15)  HR: 97 (31 Oct 2021 05:31) (78 - 97)  BP: 150/72 (31 Oct 2021 06:58) (150/72 - 183/76)  BP(mean): --  RR: 18 (31 Oct 2021 05:31) (18 - 18)  SpO2: 98% (31 Oct 2021 05:31) (98% - 100%)    PHYSICAL EXAM:  General :  NAD  HEENT--  no oral ulcers  Nodes--   LAD  Lungs--  CTA B/L  Heart--  RRR, nlS1 &S2 normal;   Abdomen--  soft, NT, ND +BS  Skin:  no rashes  Musculoskeletal exam:  No active synovitis;  (+)severe diffuse tophaceous disease in B/L hands, elbows (L>R), and feet;  (+)chronic deformities in hands causing severely decreased ROM.    LABS:                        8.8    9.34  )-----------( 464      ( 31 Oct 2021 09:19 )             27.1     10-30    133<L>  |  102  |  79<H>  ----------------------------<  91  4.7   |  20<L>  |  8.39<H>    Ca    11.4<H>      30 Oct 2021 08:17    TPro  6.5  /  Alb  x   /  TBili  x   /  DBili  x   /  AST  x   /  ALT  x   /  AlkPhos  x   10-30    PT/INR - ( 31 Oct 2021 09:19 )   PT: 14.5 sec;   INR: 1.26 ratio         PTT - ( 31 Oct 2021 09:19 )  PTT:36.9 sec    Uric Acid, Random Urine (10.28.21 @ 15:11)    Uric Acid, Random Urine: 12.5: Reference Ranges have NOT been established for random urine analytes due  to variability in fluid intake and concentration. mg/dL    Rheumatoid Factor Quant, Serum or Plasma (10.27.21 @ 09:55)    Rheumatoid Factor Quant, Serum or Plasma: <10: Test Repeated IU/mL    Anti-Nuclear Antibody in AM (10.28.21 @ 15:26)    Anti Nuclear Factor Titer: Negative: Antinuclear AB (JAMES), IFA Method        Culture - Tissue with Gram Stain (10.27.21 @ 11:47)    Gram Stain:   Few polymorphonuclear leukocytes  Numerous Gram Negative Rods per oil power field  Moderate Gram positive cocci in pairs per oil power field    -  Amikacin: S <=16    -  Amoxicillin/Clavulanic Acid: S <=8/4    -  Ampicillin: R >16 These ampicillin results predict results for amoxicillin    -  Ampicillin/Sulbactam: S 8/4 Enterobacter, Citrobacter, and Serratia may develop resistance during prolonged therapy (3-4 days)    -  Aztreonam: S <=4    -  Cefazolin: R >16 Enterobacter, Citrobacter, and Serratia may develop resistance during prolonged therapy (3-4 days)    -  Cefepime: S <=2    -  Cefoxitin: S <=8    -  Ceftriaxone: S <=1 Enterobacter, Citrobacter, and Serratia may develop resistance during prolonged therapy    -  Ciprofloxacin: S <=0.25    -  Ertapenem: S <=0.5    -  Gentamicin: S <=2    -  Imipenem: S <=1    -  Levofloxacin: S <=0.5    -  Meropenem: S <=1    -  Piperacillin/Tazobactam: S <=8    -  Tobramycin: S <=2    -  Trimethoprim/Sulfamethoxazole: S <=0.5/9.5    Specimen Source: .Tissue R foot    Culture Results:   Numerous Klebsiella oxytoca/Raoutella ornithinolytica  Few Bacteroides ovatus group  Rare Coag Negative Staphylococcus "Susceptibilities not performed"    Organism Identification: Klebsiella oxytoca /Raoutella ornithinolytica    Organism: Klebsiella oxytoca /Raoutella ornithinolytica    Method Type: JOCE        RADIOLOGY & ADDITIONAL STUDIES:    < from: Xray Hand 3 Views, Right (10.27.21 @ 04:05) >  EXAM:  XR HAND MIN 3 VIEWS RT                            PROCEDURE DATE:  10/27/2021          INTERPRETATION:  Right hand    HISTORY: Deformities     Three views of the right hand show erosion of the head of the second metacarpal bone with subluxation at the first metacarpal phalangeal joint and joint space loss of the second proximal interphalangeal joint. There are also erosions along the head of the fifth metacarpal bone. The joint spaces are maintained.    IMPRESSION: Joint erosions away from the joint surfaces which may indicate advanced gout in the proper clinical setting. Clinical correlation is suggested.      < end of copied text >    < from: Xray Foot AP + Lateral + Oblique, Right (10.27.21 @ 04:05) >  EXAM:  XR FOOT COMP MIN 3 VIEWS RT                            PROCEDURE DATE:  10/27/2021          INTERPRETATION:  Right foot    HISTORY: Toe infections     Three views of the right foot show erosion of the base of the first proximal phalanx as well as the head of the first metatarsal bone compatible with gouty changes. There are also areas of phalangeal erosion of the first distal phalanx and the second proximal phalanx. The joint spaces are otherwise maintained.    IMPRESSION: Toe erosions as noted.    Further information may be obtained from the patient's subsequent MRI of the right foot.    < end of copied text >      < from: MR Foot No Cont, Right (10.27.21 @ 11:37) >  EXAM:  MR FOOT RT                            PROCEDURE DATE:  10/27/2021          INTERPRETATION:  RIGHT FOOT MRI    CLINICAL INFORMATION: Foot wound extending to the bone. Gout.  TECHNIQUE: Multiplanar, multisequence MRI was obtained of the right foot.    FINDINGS:    There are extensive periarticular infiltrating T1/STIR hypointense foci is ultimately in diffuse erosive changes. Findings are most consistent with patient's history of gout. There is replacement of the T1 marrow signal with associated edema of the mid/distal aspect of the proximal second phalanx and of the second middle phalanx, consistent with osteomyelitis. Faint nonspecific marrow edema with preservation of the T1 marrow signal is present within the fifth metatarsal head,which may represent sequela of periarticular erosive changes. Remaining marrow signal is grossly maintained. There is hallux valgus. There is diffuse muscle atrophy. 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    IMPRESSION:  Osteomyelitis of the second proximal and middle phalanges.   Diffuse hypointense periarticular foci with diffuse intra-articular and extra-articular osseous erosions, most consistent with patient's history of gout. Amyloid deposition can have a similar appearance, however, no history of long-term dialysis is present.    < end of copied text >

## 2021-10-31 NOTE — PHYSICAL THERAPY INITIAL EVALUATION ADULT - ADDITIONAL COMMENTS
Pt is a 65 y/o male who lives in a private house c his wife. Pt states that he has a wheel chair ramp that he uses to enter the house. The pt has no stairs inside the house. [facility] from [facility] and he was only there for about 3-4 days. Pt states that he uses a RW at home for transfers and ADL's and uses a wheel chair when he has to go long distances. Pt states that he does not have a HHA and that his wife helps if he needs it. Pt is a 65 y/o male who lives alone in a Coop c elevator. The pt has no entry steps and 1 level inside the apt. Independent with all ADL's and ambulation using straight cane for long distance.

## 2021-11-01 LAB
ACE SERPL-CCNC: 73 U/L — SIGNIFICANT CHANGE UP (ref 14–82)
ANION GAP SERPL CALC-SCNC: 14 MMOL/L — SIGNIFICANT CHANGE UP (ref 5–17)
BUN SERPL-MCNC: 79 MG/DL — HIGH (ref 7–23)
CALCIUM SERPL-MCNC: 10.9 MG/DL — HIGH (ref 8.5–10.1)
CCP IGG SERPL-ACNC: <8 UNITS — SIGNIFICANT CHANGE UP
CHLORIDE SERPL-SCNC: 102 MMOL/L — SIGNIFICANT CHANGE UP (ref 96–108)
CO2 SERPL-SCNC: 19 MMOL/L — LOW (ref 22–31)
CREAT SERPL-MCNC: 8.34 MG/DL — HIGH (ref 0.5–1.3)
CULTURE RESULTS: SIGNIFICANT CHANGE UP
CULTURE RESULTS: SIGNIFICANT CHANGE UP
GBM IGG SER-ACNC: 3 — SIGNIFICANT CHANGE UP (ref 0–20)
GLUCOSE SERPL-MCNC: 94 MG/DL — SIGNIFICANT CHANGE UP (ref 70–99)
HCT VFR BLD CALC: 23.2 % — LOW (ref 39–50)
HGB BLD-MCNC: 7.6 G/DL — LOW (ref 13–17)
MCHC RBC-ENTMCNC: 27 PG — SIGNIFICANT CHANGE UP (ref 27–34)
MCHC RBC-ENTMCNC: 32.8 GM/DL — SIGNIFICANT CHANGE UP (ref 32–36)
MCV RBC AUTO: 82.6 FL — SIGNIFICANT CHANGE UP (ref 80–100)
NRBC # BLD: 0 /100 WBCS — SIGNIFICANT CHANGE UP (ref 0–0)
PLATELET # BLD AUTO: 379 K/UL — SIGNIFICANT CHANGE UP (ref 150–400)
POTASSIUM SERPL-MCNC: 4.5 MMOL/L — SIGNIFICANT CHANGE UP (ref 3.5–5.3)
POTASSIUM SERPL-SCNC: 4.5 MMOL/L — SIGNIFICANT CHANGE UP (ref 3.5–5.3)
RBC # BLD: 2.81 M/UL — LOW (ref 4.2–5.8)
RBC # FLD: 17.7 % — HIGH (ref 10.3–14.5)
RF+CCP IGG SER-IMP: NEGATIVE — SIGNIFICANT CHANGE UP
SODIUM SERPL-SCNC: 135 MMOL/L — SIGNIFICANT CHANGE UP (ref 135–145)
SPECIMEN SOURCE: SIGNIFICANT CHANGE UP
SPECIMEN SOURCE: SIGNIFICANT CHANGE UP
WBC # BLD: 8.38 K/UL — SIGNIFICANT CHANGE UP (ref 3.8–10.5)
WBC # FLD AUTO: 8.38 K/UL — SIGNIFICANT CHANGE UP (ref 3.8–10.5)

## 2021-11-01 PROCEDURE — 88313 SPECIAL STAINS GROUP 2: CPT | Mod: 26

## 2021-11-01 PROCEDURE — 99233 SBSQ HOSP IP/OBS HIGH 50: CPT

## 2021-11-01 PROCEDURE — 88348 ELECTRON MICROSCOPY DX: CPT | Mod: 26

## 2021-11-01 PROCEDURE — 99232 SBSQ HOSP IP/OBS MODERATE 35: CPT

## 2021-11-01 PROCEDURE — 50200 RENAL BIOPSY PERQ: CPT | Mod: RT

## 2021-11-01 PROCEDURE — 88350 IMFLUOR EA ADDL 1ANTB STN PX: CPT | Mod: 26

## 2021-11-01 PROCEDURE — 88346 IMFLUOR 1ST 1ANTB STAIN PX: CPT | Mod: 26

## 2021-11-01 PROCEDURE — 88305 TISSUE EXAM BY PATHOLOGIST: CPT | Mod: 26

## 2021-11-01 PROCEDURE — 76942 ECHO GUIDE FOR BIOPSY: CPT | Mod: 26

## 2021-11-01 RX ORDER — VANCOMYCIN HCL 1 G
1000 VIAL (EA) INTRAVENOUS ONCE
Refills: 0 | Status: COMPLETED | OUTPATIENT
Start: 2021-11-01 | End: 2021-11-01

## 2021-11-01 RX ORDER — COLCHICINE 0.6 MG
0.3 TABLET ORAL DAILY
Refills: 0 | Status: DISCONTINUED | OUTPATIENT
Start: 2021-11-01 | End: 2021-11-01

## 2021-11-01 RX ADMIN — Medication 650 MILLIGRAM(S): at 22:07

## 2021-11-01 RX ADMIN — Medication 0.3 MILLIGRAM(S): at 13:18

## 2021-11-01 RX ADMIN — FEBUXOSTAT 40 MILLIGRAM(S): 40 TABLET ORAL at 18:18

## 2021-11-01 RX ADMIN — MORPHINE SULFATE 2 MILLIGRAM(S): 50 CAPSULE, EXTENDED RELEASE ORAL at 13:43

## 2021-11-01 RX ADMIN — Medication 500 MILLIGRAM(S): at 22:07

## 2021-11-01 RX ADMIN — Medication 25 MILLIGRAM(S): at 22:07

## 2021-11-01 RX ADMIN — SENNA PLUS 2 TABLET(S): 8.6 TABLET ORAL at 22:06

## 2021-11-01 RX ADMIN — Medication 25 MILLIGRAM(S): at 13:19

## 2021-11-01 RX ADMIN — MORPHINE SULFATE 2 MILLIGRAM(S): 50 CAPSULE, EXTENDED RELEASE ORAL at 03:18

## 2021-11-01 RX ADMIN — Medication 25 MILLIGRAM(S): at 06:03

## 2021-11-01 RX ADMIN — CEFTRIAXONE 100 MILLIGRAM(S): 500 INJECTION, POWDER, FOR SOLUTION INTRAMUSCULAR; INTRAVENOUS at 13:14

## 2021-11-01 RX ADMIN — MORPHINE SULFATE 2 MILLIGRAM(S): 50 CAPSULE, EXTENDED RELEASE ORAL at 13:13

## 2021-11-01 RX ADMIN — MORPHINE SULFATE 2 MILLIGRAM(S): 50 CAPSULE, EXTENDED RELEASE ORAL at 19:54

## 2021-11-01 RX ADMIN — Medication 500 MILLIGRAM(S): at 13:19

## 2021-11-01 RX ADMIN — Medication 650 MILLIGRAM(S): at 06:03

## 2021-11-01 RX ADMIN — Medication 250 MILLIGRAM(S): at 13:31

## 2021-11-01 RX ADMIN — PAMIDRONATE DISODIUM 65 MILLIGRAM(S): 9 INJECTION, SOLUTION INTRAVENOUS at 15:15

## 2021-11-01 RX ADMIN — Medication 650 MILLIGRAM(S): at 13:19

## 2021-11-01 RX ADMIN — AMLODIPINE BESYLATE 10 MILLIGRAM(S): 2.5 TABLET ORAL at 06:03

## 2021-11-01 RX ADMIN — Medication 500 MILLIGRAM(S): at 06:03

## 2021-11-01 RX ADMIN — MORPHINE SULFATE 2 MILLIGRAM(S): 50 CAPSULE, EXTENDED RELEASE ORAL at 20:52

## 2021-11-01 NOTE — PROGRESS NOTE ADULT - ASSESSMENT
66M s/p bedside I&D of R foot 2nd digit  - Afebrile, WBC 9.34  - R foot hallux wound to medial aspect of IPJ to bone with fibrous wound bed, no malodor or erythema, s/p 10/27 bedside R foot 2nd digit I&D, no pus, no further gouty tophi expressed  - R foot MRI reviewed shoring possible OM 2nd toe middle and proximal phalanx, erosions more likely due to gout rather than infectious process   - no podiatry surgical intervention at this time, erosions on MRI 2/2 gout rather than infectious process  - pod podiatry plan for local wound care  - toradol and colchicine in renal dose for gout   - follow up information and instructions can be found in the follow up section of the provider d/c note   - discussed with attending

## 2021-11-01 NOTE — PROGRESS NOTE ADULT - SUBJECTIVE AND OBJECTIVE BOX
IR Post Procedure Note    Diagnosis: GWEN    Procedure: Random Renal Biopsy    : Juan Simmons MD    Contrast: None    Anesthesia: 1% Lidocaine Subcutaneous, Sedation administered by Anesthesiology    Estimated Blood Loss: Less than 10cc    Specimens: Specimens identified, labeled, confirmed and sent to lab. Adequacy of sample confirmed by Cytopathology Team.    Complications: No Immediate Complications    Anticoagulation: Resume in 48 Hours    Findings & Plan: 3 18g core bx of Right kidney obtained w 18g needle under US guidance. Sample adequacy confirmed by cytopathology. No hematoma or complications on post procedure US.      Please call Interventional Radiology with any questions, concerns, or issues.

## 2021-11-01 NOTE — PHARMACOTHERAPY INTERVENTION NOTE - COMMENTS
MD called this evening to follow up and return call from pharmacist this morning on colchicine 0.3mg daily. Patients scr-8.3 and MD would like to change order to colchicine 0.3mg q 48h.

## 2021-11-01 NOTE — PROGRESS NOTE ADULT - SUBJECTIVE AND OBJECTIVE BOX
Patient is a 66y old  Male who presents with a chief complaint of renal failure, hand/foot swelling (29 Oct 2021 18:31)      OVERNIGHT EVENTS:  none    MEDICATIONS  (STANDING):  amLODIPine   Tablet 10 milliGRAM(s) Oral daily  cefTRIAXone   IVPB 2000 milliGRAM(s) IV Intermittent every 24 hours  colchicine 0.3 milliGRAM(s) Oral daily  febuxostat 40 milliGRAM(s) Oral daily  hydrALAZINE 25 milliGRAM(s) Oral every 8 hours  influenza   Vaccine 0.5 milliLiter(s) IntraMuscular once  metroNIDAZOLE    Tablet 500 milliGRAM(s) Oral every 8 hours  pamidronate IVPB 30 milliGRAM(s) IV Intermittent once  senna 2 Tablet(s) Oral at bedtime  sodium bicarbonate 650 milliGRAM(s) Oral three times a day  vancomycin  IVPB 1000 milliGRAM(s) IV Intermittent once    MEDICATIONS  (PRN):  bisacodyl 5 milliGRAM(s) Oral every 12 hours PRN Constipation  morphine  - Injectable 2 milliGRAM(s) IV Push every 6 hours PRN Severe Pain (7 - 10)  traMADol 50 milliGRAM(s) Oral every 6 hours PRN Moderate Pain (4 - 6)      Allergies    No Known Allergies    Intolerances        SUBJECTIVE: in bed in NAD, no acute events overnight     Vital Signs Last 24 Hrs  T(C): 36.5 (01 Nov 2021 08:54), Max: 37 (01 Nov 2021 00:10)  T(F): 97.7 (01 Nov 2021 08:54), Max: 98.6 (01 Nov 2021 00:10)  HR: 92 (01 Nov 2021 08:54) (63 - 92)  BP: 155/71 (01 Nov 2021 08:54) (123/74 - 173/68)  BP(mean): --  RR: 18 (01 Nov 2021 08:54) (18 - 19)  SpO2: 96% (01 Nov 2021 08:54) (95% - 100%)    PHYSICAL EXAM:  GENERAL: NAD, well-groomed, well-developed  HEAD:  Atraumatic, Normocephalic  EYES: EOMI, PERRLA, conjunctiva and sclera clear  ENMT: No tonsillar erythema, exudates, or enlargement; Moist mucous membranes, Good dentition, No lesions  NECK: Supple, No JVD, Normal thyroid  CHEST/LUNG: Clear to  auscultation bilaterally; No rales, rhonchi, wheezing, or rubs  bilaterally  HEART: Regular rate and rhythm; No murmurs, rubs, or gallops  ABDOMEN: Soft, Nontender, Nondistended; Bowel sounds present  EXTREMITIES:  2+ Peripheral Pulses, No clubbing, cyanosis, or edema BL LE  SKIN: right index ulcer , right second toe ulcer .  gross tophi all over   NERVOUS SYSTEM:  Alert & Oriented X3, Good concentration; Motor Strength 4/5 B/L upper and lower extremities;   DTRs 2+ intact and symmetric, sensation intact BL    LABS:                                    8.8    9.34  )-----------( 464      ( 31 Oct 2021 09:19 )             27.1     11-01    135  |  102  |  79<H>  ----------------------------<  94  4.5   |  19<L>  |  8.34<H>    Ca    10.9<H>      01 Nov 2021 07:42      Cultures;   CAPILLARY BLOOD GLUCOSE        Lipid panel:           RADIOLOGY & ADDITIONAL TESTS:  < from: US Duplex Arterial Lower Ext Compl, Bilateral (10.29.21 @ 13:29) >  IMPRESSION:    No evidence of arterial occlusion in the bilateral lower extremities.  Elevated velocities within the bilateral lower extremity arteries, as above, compatible with segmental stenoses.    < end of copied text >      Imaging Personally Reviewed:  [ x] YES      Consultant(s) Notes Reviewed:  [x ] YES     Care Discussed with [x ] Consultants [X ] Patient [x ] Family  [x ]    [x ]  Other; RN

## 2021-11-01 NOTE — PROGRESS NOTE ADULT - SUBJECTIVE AND OBJECTIVE BOX
MARLA WELLS III  MRN-[id number]    Interval History: The pt was seen and examined earlier, no distress, no new complains. s/p renal biopsy today.     PAST MEDICAL & SURGICAL HISTORY:  No pertinent past medical history        ROS:    [ ] Unobtainable because:  [x ] All other systems negative          Allergies  No Known Allergies        ANTIMICROBIALS:    cefTRIAXone   IVPB 2000 every 24 hours  influenza   Vaccine 0.5 once  metroNIDAZOLE    Tablet 500 every 8 hours      MEDICATIONS  (STANDING):  amLODIPine   Tablet 10 daily  febuxostat 40 daily  hydrALAZINE 25 every 8 hours  influenza   Vaccine 0.5 once  senna 2 at bedtime      PRN  bisacodyl 5 milliGRAM(s) Oral every 12 hours PRN  morphine  - Injectable 2 milliGRAM(s) IV Push every 6 hours PRN  traMADol 50 milliGRAM(s) Oral every 6 hours PRN      Physical Exam:  Vital Signs Last 24 Hrs  T(F): 97.4 (21 @ 17:53), Max: 98.6 (21 @ 00:10)  HR: 93 (21 @ 17:53)  BP: 148/83 (21 @ 17:53)  RR: 17 (21 @ 17:53)  SpO2: 96% (21 @ 17:53) (96% - 100%)  Wt(kg): --      Physical Exam:  Constitutional: non-toxic, no distress  HEAD/EYES: anicteric, no conjunctival injection, congenital malformation of both eyes   ENT:  supple, no thrush, poor dentition with broken teeth and multiple carries   Cardiovascular:   normal S1, S2, no murmur, no edema  Respiratory:  clear BS bilaterally, no wheezes, no rales  GI:  soft, non-tender, normal bowel sounds  :  no arellano, no CVA tenderness  Musculoskeletal:  no synovitis, normal ROM, tophaceous gouty changes of all 10 fingers with right index finger having tophi eroding through the skin, feet wrapped bilaterally   Neurologic: awake and alert, normal strength, no focal findings  Skin:  no rash, no erythema, no phlebitis, dressing on back c/d/i  Heme/Onc: no cervical  lymphadenopathy   Psychiatric:  awake, alert, appropriate mood    WBC Count: 9.34 K/uL (10 @ 09:19)  WBC Count: 6.81 K/uL (10-30 @ 08:17)  WBC Count: 6.72 K/uL (10-29 @ 07:44)  WBC Count: 6.36 K/uL (10-28 @ 16:15)  WBC Count: 6.92 K/uL (10-28 @ 11:32)  WBC Count: 6.98 K/uL (10-26 @ 22:16)                            8.8    9.34  )-----------( 464      ( 31 Oct 2021 09:19 )             27.1           135  |  102  |  79<H>  ----------------------------<  94  4.5   |  19<L>  |  8.34<H>    Ca    10.9<H>      2021 07:42        Creatinine Trend: 8.34<--, 8.34<--, 8.39<--, 8.55<--, 9.04<--, 9.11<--            Urinalysis Basic - ( 27 Oct 2021 03:00 )    Color: Yellow / Appearance: Clear / S.010 / pH: x  Gluc: x / Ketone: Negative  / Bili: Negative / Urobili: Negative mg/dL   Blood: x / Protein: 30 mg/dL / Nitrite: Negative   Leuk Esterase: Negative / RBC: 0-2 /HPF / WBC 3-5   Sq Epi: x / Non Sq Epi: Few / Bacteria: Occasional        Creatinine Trend: 9.04<--, 9.11<--, 9.72<--  Lactate, Blood: 2.0 mmol/L (10-27-21 @ 01:42)  Lactate, Blood: 3.5 mmol/L (10-26-21 @ 22:16)      MICROBIOLOGY:  Vancomycin Level, Trough: 11.2 ug/mL (10-28-21 @ 11:32)  v  .Tissue R foot  10-27-21   Numerous Klebsiella oxytoca/Raoutella ornithinolytica  --    Few polymorphonuclear leukocytes  Numerous Gram Negative Rods per oil power field  Moderate Gram positive cocci in pairs per oil power field      Clean Catch Clean Catch (Midstream)  10-27-21   <10,000 CFU/mL Normal Urogenital Meenu  --  --      .Blood Blood  10-27-21   No growth to date.  --  --          Rapid RVP Result: NotDetec (10-27 @ 05:27)        C-Reactive Protein, Serum: 75 (10-27)        SARS-CoV-2: NotDetec (27 Oct 2021 05:27)    RADIOLOGY   MR Foot No Cont, Right (10.27.21 @ 11:37) >    FINDINGS:    There are extensive periarticular infiltrating T1/STIR hypointense foci is ultimately in diffuse erosive changes. Findings are most consistent with patient's history of gout. There is replacement of the T1 marrow signal with associated edema of the mid/distal aspect of the proximal second phalanx and of the second middle phalanx, consistent with osteomyelitis. Faint nonspecific marrow edema with preservation of the T1 marrow signal is present within the fifth metatarsal head,which may represent sequela of periarticular erosive changes. Remaining marrow signal is grossly maintained. There is hallux valgus. There is diffuse muscle atrophy. 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    IMPRESSION:  Osteomyelitis of the second proximal and middle phalanges.   Diffuse hypointense periarticular foci with diffuse intra-articular and extra-articular osseous erosions, most consistent with patient's history of gout. Amyloid deposition can have a similar appearance, however, no history of long-term dialysis is present.

## 2021-11-01 NOTE — PROGRESS NOTE ADULT - SUBJECTIVE AND OBJECTIVE BOX
[facility] NEPHROLOGY SERVICES, [facility]  NEPHROLOGY AND HYPERTENSION  [address]  [address]  [place] [address]  [phone]    MD ELLI WHITTAKER, MD VANDANA BULLARD, MD ROBBI LUCERO, MD RUBEN SOLORZANO, MD JANENE MATTA MD          Patient events noted  Post renal bx    MEDICATIONS  (STANDING):  amLODIPine   Tablet 10 milliGRAM(s) Oral daily  cefTRIAXone   IVPB 2000 milliGRAM(s) IV Intermittent every 24 hours  febuxostat 40 milliGRAM(s) Oral daily  hydrALAZINE 25 milliGRAM(s) Oral every 8 hours  influenza   Vaccine 0.5 milliLiter(s) IntraMuscular once  metroNIDAZOLE    Tablet 500 milliGRAM(s) Oral every 8 hours  senna 2 Tablet(s) Oral at bedtime  sodium bicarbonate 650 milliGRAM(s) Oral three times a day    MEDICATIONS  (PRN):  bisacodyl 5 milliGRAM(s) Oral every 12 hours PRN Constipation  morphine  - Injectable 2 milliGRAM(s) IV Push every 6 hours PRN Severe Pain (7 - 10)  traMADol 50 milliGRAM(s) Oral every 6 hours PRN Moderate Pain (4 - 6)      10-31-21 @ 07:  -  21 @ 07:00  --------------------------------------------------------  IN: 0 mL / OUT: 200 mL / NET: -200 mL    21 @ 07:  -  21 @ 22:16  --------------------------------------------------------  IN: 550 mL / OUT: 650 mL / NET: -100 mL      PHYSICAL EXAM:      T(C): 36.3 (21 @ 17:53), Max: 37 (21 @ 00:10)  HR: 93 (11-01-21 @ 17:53) (75 - 93)  BP: 148/83 (21 @ 17:53) (135/64 - 168/68)  RR: 17 (21 @ 17:53) (17 - 19)  SpO2: 96% (21 @ 17:53) (96% - 100%)  Wt(kg): --  Lungs clear  Heart S1S2  Abd soft NT ND  Extremities:   tophi  1 edema                                    8.8    9.34  )-----------( 464      ( 31 Oct 2021 09:19 )             27.1         135  |  102  |  79<H>  ----------------------------<  94  4.5   |  19<L>  |  8.34<H>    Ca    10.9<H>      2021 07:42    Immunoelectrophoresis, Serum (10.28.21 @ 15:29)    Protein Total, Serum: 6.0 g/dL    Total Protein, Serum: 6.0 g/dL    Quantitative Ig mg/dL    Quantitative IgA: 554 mg/dL    Quantitative IgM: 106 mg/dL    JOHN Kappa: 14.71 mg/dL    JOHN Lambda: 14.63 mg/dL    Kappa/Lambda Free Light Chain Ratio, Serum: 1.01 Ratio    Anti-Nuclear Antibody in AM (10.28.21 @ 15:26)    Anti Nuclear Factor Titer: Negative: Antinuclear AB (JAMES), IFA Method          Creatinine Trend: 8.34<--, 8.34<--, 8.39<--, 8.55<--, 9.04<--, 9.11<--    Assessment   GWEN degree of CKD unclear, associated with tophaceous gout, anemia and hypercalcemia   r/o Urate nephropathy; paraprotein related disease; occult GN less likely  Mild hyperkalemia/acidosis  Hypercalcemia    Plan  Oral bicarbonate   Paraprotein screen; serologies noted  Pb level  noted  Allopurinol 100 mg PO QD  Rheum evaluation noted; low dose colchicine; renal dosing       Marcel Carmona MD

## 2021-11-01 NOTE — PROGRESS NOTE ADULT - ASSESSMENT
66M with no reported PMH who was sent to the ED for abnormal labs.   Patient found to have severe gouty changes with high uric acid levels and is in renal failure with creatinine ~9   hand xray shows severe gouty changes  MRI right foot with osteomyelitis of 2nd phalanges  unclear etiology yet of renal failure but ould be related to severe untreated gout   low term antibiotics might be difficult in an individual who hasn't taken care of himself properly in 6 years. In this case, assuming the MRI findings are infectious and not related to rheumatologic disorder, it would be ideal to undergo amputation. would need to assess ability to heal and will order ravindra.pvr.     10/28: MRI with Osteomyelitis of the right second proximal and middle phalanges, pt is thinking about his options, would like to talk to his son. The pt is afebrile, on RA, no leukocytosis, anemic, wound culture is growing Klebsiella, Vancomycin trough is 11.2, give one time dose of Vancomycin 1250 mg IV now, will repeat vancomycin trough in 23-24 hrs, cefepime renally dosed continued.   10/29: decided to reconsider amputation, cultures with klebsiella and bacteroides thus have changed to ceftriaxone and PO flagyl, renal biopsy possibly on Monday 11/1: s/p renal biopsy today. patient's gout is quite extensive and many of the bony changes seen on imaging may mimic infection such  as osteomyelitis. Patient never had fevers, denies trauma, has negative blood cultures. Per podiatry and rheum these changes are more consistent with his hx of gout rather then infection. It would seem reasonable to treat Mr. Ulloa for a skin and soft tissue infection for 10-14 days at which point stop antibiotics and focus on treating his gout. he is taking alluporinol as well as renally dosed colchicine with plan to treat out-patient with specialized infusions as per rheumatology. while inpatient can continue IV antibiotics though can likely be transitioned to augmentin to complete 14 days of antibiotics.          osteomyelitis  Chronic kidney disease  Gout   therapeutic drug monitoring required with IV vancomycin      Plan:  stop further doses of vancomycin   continue ceftriaxone 2g q24hrs  d/c planning: Augmentin 500mg q12hrs (renally dosed) until 11/10/21  continue PO flagyl   blood cultures NGTD   RAVINDRA/PVR- some mild disease but overall good vasculature     #Kidney disease  nephro following  s/p renal biopsy-f/u pathology  follow up on all labs sent by nephro and rheumatology   avoid nephrotoxic drugs    #Gout  allopurinol   education on diet and which foods/food categories to avoid  consider dietician consultation   Rheum consult appreciated     Discussed with Dr. Martin Marie DO  Infectious Disease Attending  Pager [phone]  After 5pm/weekends please call [phone] for all inquiries and new consults

## 2021-11-01 NOTE — PROGRESS NOTE ADULT - ASSESSMENT
66M with no reported PMH who was sent to the ED for abnormal labs. Patient found to have severe gouty changes with high uric acid levels and is in renal failure with creatinine ~9 hand xray shows severe gouty changes  MRI right foot with osteomyelitis of 2nd phalanges     1. Osteomyelitis  MRI R foot: OM of the 2nd prox and middle phalanges.  cx klebsiella and bacteroides  abx changed to rocephin and flagyl.  Pt is now reconsidering undergoing amputation, notified podiatry resident.  ID on board  Podiatry on board   RAVINDRA as above     2. GWEN  r/o urate nephropathy, ? paraprotein dx  elev Kappa/ lambda, most likely due to kidney injury, ratio is Normal, f/u SPEP, JOHN, bence jones  still pending   Nephrology on board  Renal bx on today       3. Hyperkalemia/ mild acidosis  in the [facility] GWEN  c/w lokelma, NAHCO3  Nephrology on board   resolved hyperkalemia    4. Gout  c/w allopurinol pain meds, superimposed infection  get  rheumatology consult    10/31/2021 consult noted     5. Anemia of chronic disease.  could be sec RF vs r/o myeloma  awaiting SPEP and JOHN na dbence jones   pt denies any melena, hematochezia, hematemesis  f/u stool occult   s/p 2 units prbc since admsision  on 10/28 and again 10/29/2021 monitor hgb   10/31/2021 hgb low but stable    6. HTN  started on amlodipine.  10/30/2021 increase Norvasc  10/31/2021 Bp still elevated adding hydralazine  11/1/2021 uptitrate hydralazine as needed

## 2021-11-01 NOTE — PROGRESS NOTE ADULT - SUBJECTIVE AND OBJECTIVE BOX
INTERVAL HPI/OVERNIGHT EVENTS:  Pt underwent kidney bx today.  Feeling fine overall.  Still w/ burning pain in his fingertips.  No significant joint pains at this time.    MEDICATIONS  (STANDING):  amLODIPine   Tablet 10 milliGRAM(s) Oral daily  cefTRIAXone   IVPB 2000 milliGRAM(s) IV Intermittent every 24 hours  febuxostat 40 milliGRAM(s) Oral daily  hydrALAZINE 25 milliGRAM(s) Oral every 8 hours  influenza   Vaccine 0.5 milliLiter(s) IntraMuscular once  metroNIDAZOLE    Tablet 500 milliGRAM(s) Oral every 8 hours  senna 2 Tablet(s) Oral at bedtime  sodium bicarbonate 650 milliGRAM(s) Oral three times a day    MEDICATIONS  (PRN):  bisacodyl 5 milliGRAM(s) Oral every 12 hours PRN Constipation  morphine  - Injectable 2 milliGRAM(s) IV Push every 6 hours PRN Severe Pain (7 - 10)  traMADol 50 milliGRAM(s) Oral every 6 hours PRN Moderate Pain (4 - 6)      Allergies    No Known Allergies      Vital Signs Last 24 Hrs  T(C): 36.3 (01 Nov 2021 17:53), Max: 37 (01 Nov 2021 00:10)  T(F): 97.4 (01 Nov 2021 17:53), Max: 98.6 (01 Nov 2021 00:10)  HR: 93 (01 Nov 2021 17:53) (75 - 93)  BP: 148/83 (01 Nov 2021 17:53) (123/74 - 168/68)  BP(mean): --  RR: 17 (01 Nov 2021 17:53) (17 - 19)  SpO2: 96% (01 Nov 2021 17:53) (96% - 100%)    PHYSICAL EXAM:  General :  NAD  HEENT--  no oral ulcers  Nodes--   LAD  Lungs--  CTA B/L  Heart--  RRR, nlS1 &S2 normal;   Abdomen--  soft, NT, ND +BS  Skin:  no rashes  Musculoskeletal exam:  No active synovitis;  (+)severe diffuse tophaceous disease in B/L hands, elbows (L>R), and feet;  (+)chronic deformities in hands causing severely decreased ROM.      LABS:                        8.8    9.34  )-----------( 464      ( 31 Oct 2021 09:19 )             27.1     11-01    135  |  102  |  79<H>  ----------------------------<  94  4.5   |  19<L>  |  8.34<H>    Ca    10.9<H>      01 Nov 2021 07:42      PT/INR - ( 31 Oct 2021 09:19 )   PT: 14.5 sec;   INR: 1.26 ratio         PTT - ( 31 Oct 2021 09:19 )  PTT:36.9 sec    Cyclic Citrullinated Peptide AB (10.31.21 @ 12:53)    Cyclic Cit Pep Result: <8 Units    CCP Antibody IgG Interpretation: Negative: Method: EIA  Cyclic Citrullinated Peptide Ab, IGG Reference Range:                Units                </= 19        Negative                20 – 39       Weak Positive                40 – 80       Moderate Positive                >80             Strong Positive        RADIOLOGY & ADDITIONAL TESTS:

## 2021-11-01 NOTE — PROGRESS NOTE ADULT - SUBJECTIVE AND OBJECTIVE BOX
Patient is a 66y old  Male who presents with a chief complaint of renal failure, hand/foot swelling (31 Oct 2021 18:43)       INTERVAL HPI/OVERNIGHT EVENTS:  Patient seen and evaluated at bedside.  Pt is resting comfortable in NAD. Denies N/V/F/C.    Allergies    No Known Allergies    Intolerances        Vital Signs Last 24 Hrs  T(C): 36.8 (01 Nov 2021 05:28), Max: 37 (01 Nov 2021 00:10)  T(F): 98.2 (01 Nov 2021 05:28), Max: 98.6 (01 Nov 2021 00:10)  HR: 82 (01 Nov 2021 05:28) (63 - 87)  BP: 151/73 (01 Nov 2021 05:28) (123/74 - 173/68)  BP(mean): --  RR: 19 (01 Nov 2021 05:28) (18 - 19)  SpO2: 98% (01 Nov 2021 05:28) (95% - 100%)    LABS:                        8.8    9.34  )-----------( 464      ( 31 Oct 2021 09:19 )             27.1     10-31    134<L>  |  100  |  80<H>  ----------------------------<  80  4.5   |  18<L>  |  8.34<H>    Ca    11.9<H>      31 Oct 2021 09:19      PT/INR - ( 31 Oct 2021 09:19 )   PT: 14.5 sec;   INR: 1.26 ratio         PTT - ( 31 Oct 2021 09:19 )  PTT:36.9 sec    CAPILLARY BLOOD GLUCOSE          Lower Extremity Physical Exam:  Vascular: DP/PT 2/4, B/L, CFT <3 seconds B/L, Temperature gradient warm to cool, B/L.   Neuro: Epicritic sensation intact to the level of digits, B/L.  Musculoskeletal/Ortho: B/L HAV R>L, hammered and laterally deviated digits x10  Skin: R foot hallux wound to medial aspect of IPJ to bone with fibrous wound bed, no malodor or erythema,  R foot 2nd digit s/p bedside I&D, no pus, no further gouty tophi expressed    RADIOLOGY & ADDITIONAL TESTS:

## 2021-11-01 NOTE — PROGRESS NOTE ADULT - ASSESSMENT
66 year old male with severe tophaceous gout, worst in his hands, feel, and elbows.  X-rays,  MRI reveal diffuse erosive disease in his hands and feet, as well as osteomyelitis in his right 2nd toe.  Erosions most likely due to gout; w/u for other inflammatory arthritides negative.    - Continue Uloric 40mg daily.  On colchicine 0.3mg every other day (renal dosing) as PPX.    - Pt may benefit from Krystexxa (pegloticase) infusions as an outpatient.    - Discussed gout dietary recommendations.

## 2021-11-02 LAB
ANA TITR SER: NEGATIVE — SIGNIFICANT CHANGE UP
ANION GAP SERPL CALC-SCNC: 13 MMOL/L — SIGNIFICANT CHANGE UP (ref 5–17)
BUN SERPL-MCNC: 77 MG/DL — HIGH (ref 7–23)
CALCIUM SERPL-MCNC: 11.5 MG/DL — HIGH (ref 8.5–10.1)
CHLORIDE SERPL-SCNC: 101 MMOL/L — SIGNIFICANT CHANGE UP (ref 96–108)
CO2 SERPL-SCNC: 20 MMOL/L — LOW (ref 22–31)
CREAT SERPL-MCNC: 8.31 MG/DL — HIGH (ref 0.5–1.3)
GLUCOSE SERPL-MCNC: 102 MG/DL — HIGH (ref 70–99)
HCT VFR BLD CALC: 26.3 % — LOW (ref 39–50)
HGB BLD-MCNC: 8.3 G/DL — LOW (ref 13–17)
M PROTEIN 24H UR ELPH-MRATE: PRESENT
MCHC RBC-ENTMCNC: 26.6 PG — LOW (ref 27–34)
MCHC RBC-ENTMCNC: 31.6 GM/DL — LOW (ref 32–36)
MCV RBC AUTO: 84.3 FL — SIGNIFICANT CHANGE UP (ref 80–100)
NRBC # BLD: 0 /100 WBCS — SIGNIFICANT CHANGE UP (ref 0–0)
PLATELET # BLD AUTO: 456 K/UL — HIGH (ref 150–400)
POTASSIUM SERPL-MCNC: 4.7 MMOL/L — SIGNIFICANT CHANGE UP (ref 3.5–5.3)
POTASSIUM SERPL-SCNC: 4.7 MMOL/L — SIGNIFICANT CHANGE UP (ref 3.5–5.3)
RBC # BLD: 3.12 M/UL — LOW (ref 4.2–5.8)
RBC # FLD: 17.9 % — HIGH (ref 10.3–14.5)
SODIUM SERPL-SCNC: 134 MMOL/L — LOW (ref 135–145)
WBC # BLD: 9.95 K/UL — SIGNIFICANT CHANGE UP (ref 3.8–10.5)
WBC # FLD AUTO: 9.95 K/UL — SIGNIFICANT CHANGE UP (ref 3.8–10.5)

## 2021-11-02 PROCEDURE — 99232 SBSQ HOSP IP/OBS MODERATE 35: CPT

## 2021-11-02 RX ORDER — ERYTHROPOIETIN 10000 [IU]/ML
10000 INJECTION, SOLUTION INTRAVENOUS; SUBCUTANEOUS ONCE
Refills: 0 | Status: DISCONTINUED | OUTPATIENT
Start: 2021-11-02 | End: 2021-11-03

## 2021-11-02 RX ORDER — SODIUM CHLORIDE 9 MG/ML
1000 INJECTION INTRAMUSCULAR; INTRAVENOUS; SUBCUTANEOUS
Refills: 0 | Status: DISCONTINUED | OUTPATIENT
Start: 2021-11-02 | End: 2021-11-02

## 2021-11-02 RX ORDER — SODIUM CHLORIDE 9 MG/ML
1000 INJECTION, SOLUTION INTRAVENOUS
Refills: 0 | Status: COMPLETED | OUTPATIENT
Start: 2021-11-02 | End: 2021-11-02

## 2021-11-02 RX ORDER — PANTOPRAZOLE SODIUM 20 MG/1
40 TABLET, DELAYED RELEASE ORAL DAILY
Refills: 0 | Status: DISCONTINUED | OUTPATIENT
Start: 2021-11-02 | End: 2021-11-03

## 2021-11-02 RX ADMIN — FEBUXOSTAT 40 MILLIGRAM(S): 40 TABLET ORAL at 11:42

## 2021-11-02 RX ADMIN — MORPHINE SULFATE 2 MILLIGRAM(S): 50 CAPSULE, EXTENDED RELEASE ORAL at 19:13

## 2021-11-02 RX ADMIN — Medication 500 MILLIGRAM(S): at 13:48

## 2021-11-02 RX ADMIN — Medication 40 MILLIGRAM(S): at 17:03

## 2021-11-02 RX ADMIN — Medication 650 MILLIGRAM(S): at 05:33

## 2021-11-02 RX ADMIN — Medication 500 MILLIGRAM(S): at 21:51

## 2021-11-02 RX ADMIN — Medication 650 MILLIGRAM(S): at 21:51

## 2021-11-02 RX ADMIN — AMLODIPINE BESYLATE 10 MILLIGRAM(S): 2.5 TABLET ORAL at 05:33

## 2021-11-02 RX ADMIN — Medication 25 MILLIGRAM(S): at 13:49

## 2021-11-02 RX ADMIN — CEFTRIAXONE 100 MILLIGRAM(S): 500 INJECTION, POWDER, FOR SOLUTION INTRAMUSCULAR; INTRAVENOUS at 11:40

## 2021-11-02 RX ADMIN — MORPHINE SULFATE 2 MILLIGRAM(S): 50 CAPSULE, EXTENDED RELEASE ORAL at 18:42

## 2021-11-02 RX ADMIN — SENNA PLUS 2 TABLET(S): 8.6 TABLET ORAL at 21:51

## 2021-11-02 RX ADMIN — MORPHINE SULFATE 2 MILLIGRAM(S): 50 CAPSULE, EXTENDED RELEASE ORAL at 12:38

## 2021-11-02 RX ADMIN — Medication 650 MILLIGRAM(S): at 13:48

## 2021-11-02 RX ADMIN — SODIUM CHLORIDE 100 MILLILITER(S): 9 INJECTION, SOLUTION INTRAVENOUS at 15:49

## 2021-11-02 RX ADMIN — Medication 500 MILLIGRAM(S): at 05:33

## 2021-11-02 RX ADMIN — MORPHINE SULFATE 2 MILLIGRAM(S): 50 CAPSULE, EXTENDED RELEASE ORAL at 11:40

## 2021-11-02 RX ADMIN — Medication 25 MILLIGRAM(S): at 05:33

## 2021-11-02 RX ADMIN — Medication 25 MILLIGRAM(S): at 21:51

## 2021-11-02 NOTE — DIETITIAN INITIAL EVALUATION ADULT. - OTHER INFO
Pt report a good appetite, consuming > 75% of meal PTA, with a UBW of 190-200# States no chewing/swallowing difficulty.  Denies any nausea, vomiting, diarrhea or constipation.  Pt live alone, but son is his caregiver and does the shopping/cooking.  Pt has a h/o htn; believes to be elevated due to the procedure.  Pt is currently on a no potassium diet, due to renal disease; labs indicate potassium levels are WDL. Pt has h/o of gout, experiencing swelling in the hand and foot. Addressed pt condition, Pt report a good appetite, consuming > 75% of meal PTA. Pt was unsure of UBW, stating a range of 190-200#. States no chewing/swallowing difficulty.  Denies any nausea, vomiting, diarrhea or constipation.  Pt lives alone, but son is caregiver and does the shopping/cooking for pt.  Pt has a h/o htn; believes to be elevated due to the procedure.  Per flowsheets, pt consuming 90% of meals during LOS. Pt is currently on a no potassium diet, due to renal disease; labs indicate potassium levels are WDL. Pt has h/o of gout, experiencing swelling in the hand and foot.  Educated and provide material on purine, low sodium diet.  Pt expressed understanding. Pt report a good appetite, consuming 90 % of meals as per flowsheets. Pt was unsure of UBW, stating a range of 190-200# unclear of any recent weight changes. Questionable accuracy of weight history. States no chewing/swallowing difficulty.  Denies any nausea, vomiting, diarrhea or constipation.  Pt lives alone, but son is caregiver and does the shopping/cooking for pt.  Pt has a h/o htn; believes to be elevated due to current hospital medical procedures. PTA pt reports eating well.  Pt is currently on a no concentrated potassium diet; as per labs potassium levels have been WDL since 10/30. Pt  received Lokelma for elevated potassium. Suggest dc potassium restriction. Per chart noted severe gout related changes, experiencing swelling in the hand and foot.  Educated and provided material on low purine, low sodium diet.  Pt expressed understanding. Noted elevated BUN/Cr and eGFR = 7. Nephrology following.

## 2021-11-02 NOTE — DIETITIAN INITIAL EVALUATION ADULT. - REASON INDICATOR FOR ASSESSMENT
Pt seen for consult on diet for renal disease and gout. Pt seen for consult for assessment and education.

## 2021-11-02 NOTE — PROGRESS NOTE ADULT - SUBJECTIVE AND OBJECTIVE BOX
Patient is a 66y old  Male who presents with a chief complaint of renal failure, hand/foot swelling (29 Oct 2021 18:31)      OVERNIGHT EVENTS:  none    MEDICATIONS  (STANDING):  amLODIPine   Tablet 10 milliGRAM(s) Oral daily  cefTRIAXone   IVPB 2000 milliGRAM(s) IV Intermittent every 24 hours  febuxostat 40 milliGRAM(s) Oral daily  hydrALAZINE 25 milliGRAM(s) Oral every 8 hours  influenza   Vaccine 0.5 milliLiter(s) IntraMuscular once  metroNIDAZOLE    Tablet 500 milliGRAM(s) Oral every 8 hours  senna 2 Tablet(s) Oral at bedtime  sodium bicarbonate 650 milliGRAM(s) Oral three times a day    MEDICATIONS  (PRN):  bisacodyl 5 milliGRAM(s) Oral every 12 hours PRN Constipation  morphine  - Injectable 2 milliGRAM(s) IV Push every 6 hours PRN Severe Pain (7 - 10)  traMADol 50 milliGRAM(s) Oral every 6 hours PRN Moderate Pain (4 - 6)      Allergies    No Known Allergies    Intolerances        SUBJECTIVE: in bed in NAD, no acute events overnight     Vital Signs Last 24 Hrs  T(C): 36.9 (02 Nov 2021 11:28), Max: 37.2 (01 Nov 2021 23:45)  T(F): 98.4 (02 Nov 2021 11:28), Max: 98.9 (01 Nov 2021 23:45)  HR: 87 (02 Nov 2021 11:28) (84 - 98)  BP: 131/57 (02 Nov 2021 11:28) (131/57 - 168/68)  BP(mean): --  RR: 17 (02 Nov 2021 11:28) (17 - 17)  SpO2: 100% (02 Nov 2021 11:28) (96% - 100%)    PHYSICAL EXAM:  GENERAL: NAD, well-groomed, well-developed  HEAD:  Atraumatic, Normocephalic  EYES: EOMI, PERRLA, conjunctiva and sclera clear  ENMT: No tonsillar erythema, exudates, or enlargement; Moist mucous membranes, Good dentition, No lesions  NECK: Supple, No JVD, Normal thyroid  CHEST/LUNG: Clear to  auscultation bilaterally; No rales, rhonchi, wheezing, or rubs  bilaterally  HEART: Regular rate and rhythm; No murmurs, rubs, or gallops  ABDOMEN: Soft, Nontender, Nondistended; Bowel sounds present  EXTREMITIES:  2+ Peripheral Pulses, No clubbing, cyanosis, or edema BL LE  SKIN: right index ulcer , right second toe ulcer .  gross tophi all over   NERVOUS SYSTEM:  Alert & Oriented X3, Good concentration; Motor Strength 4/5 B/L upper and lower extremities;   DTRs 2+ intact and symmetric, sensation intact BL    LABS:                                                      8.3    9.95  )-----------( 456      ( 02 Nov 2021 06:31 )             26.3   11-02    134<L>  |  101  |  77<H>  ----------------------------<  102<H>  4.7   |  20<L>  |  8.31<H>    Ca    11.5<H>      02 Nov 2021 06:31      Cultures;   CAPILLARY BLOOD GLUCOSE        Lipid panel:           RADIOLOGY & ADDITIONAL TESTS:  < from: US Duplex Arterial Lower Ext Compl, Bilateral (10.29.21 @ 13:29) >  IMPRESSION:    No evidence of arterial occlusion in the bilateral lower extremities.  Elevated velocities within the bilateral lower extremity arteries, as above, compatible with segmental stenoses.    < end of copied text >      Imaging Personally Reviewed:  [ x] YES      Consultant(s) Notes Reviewed:  [x ] YES     Care Discussed with [x ] Consultants [X ] Patient [x ] Family  [x ]    [x ]  Other; RN

## 2021-11-02 NOTE — DIETITIAN INITIAL EVALUATION ADULT. - ETIOLOGY
inadequate energy need related to altered GI function. inadequate energy/protein intake related to dx of renal failure and painful, severe gout

## 2021-11-02 NOTE — PROGRESS NOTE ADULT - SUBJECTIVE AND OBJECTIVE BOX
[facility] NEPHROLOGY SERVICES, [facility]  NEPHROLOGY AND HYPERTENSION  [address]  [address]  [place] [address]  [phone]    MD ELLI WHITTAKER, MD VANDANA BULLARD, MD ROBBI LUCERO, MD RUBEN SOLORZANO, MD JANENE MATTA MD          Patient events noted  No distress    MEDICATIONS  (STANDING):  amLODIPine   Tablet 10 milliGRAM(s) Oral daily  cefTRIAXone   IVPB 2000 milliGRAM(s) IV Intermittent every 24 hours  epoetin marilynn-epbx (RETACRIT) Injectable 68036 Unit(s) SubCutaneous once  febuxostat 40 milliGRAM(s) Oral daily  hydrALAZINE 25 milliGRAM(s) Oral every 8 hours  influenza   Vaccine 0.5 milliLiter(s) IntraMuscular once  metroNIDAZOLE    Tablet 500 milliGRAM(s) Oral every 8 hours  senna 2 Tablet(s) Oral at bedtime  sodium bicarbonate 650 milliGRAM(s) Oral three times a day  sodium chloride 0.9%. 1000 milliLiter(s) (125 mL/Hr) IV Continuous <Continuous>    MEDICATIONS  (PRN):  bisacodyl 5 milliGRAM(s) Oral every 12 hours PRN Constipation  morphine  - Injectable 2 milliGRAM(s) IV Push every 6 hours PRN Severe Pain (7 - 10)  traMADol 50 milliGRAM(s) Oral every 6 hours PRN Moderate Pain (4 - 6)      11-01-21 @ 07:01  -  11-02-21 @ 07:00  --------------------------------------------------------  IN: 550 mL / OUT: 1050 mL / NET: -500 mL    11-02-21 @ 07:01  -  11-02-21 @ 14:00  --------------------------------------------------------  IN: 400 mL / OUT: 0 mL / NET: 400 mL      PHYSICAL EXAM:      T(C): 36.9 (11-02-21 @ 11:28), Max: 37.2 (11-01-21 @ 23:45)  HR: 83 (11-02-21 @ 13:50) (83 - 98)  BP: 116/56 (11-02-21 @ 13:50) (116/56 - 166/76)  RR: 17 (11-02-21 @ 11:28) (17 - 17)  SpO2: 97% (11-02-21 @ 11:42) (96% - 100%)  Wt(kg): --  Lungs clear  Heart S1S2  Abd soft NT ND  Extremities:  tophaceous gout 1  edema                                    8.3    9.95  )-----------( 456      ( 02 Nov 2021 06:31 )             26.3     11-02    134<L>  |  101  |  77<H>  ----------------------------<  102<H>  4.7   |  20<L>  |  8.31<H>    Ca    11.5<H>      02 Nov 2021 06:31          Creatinine Trend: 8.31<--, 8.34<--, 8.34<--, 8.39<--, 8.55<--, 9.04<--      Assessment   GWEN CKD, degree unclear   Renal Bx c/w urate nephropathy; underlying interstitial nephritis   Unclear chronicity as sampling poor   Hypercalcemia; unclear etiology await serum IF and urine BJ;   Renal indices remain stable, albeit severely impaired.     Plan:  Follow pthrp;   IVF trial NS  Will discuss with ID regarding course of prednisone  Follow serum IF and urine BJ    Marcel Carmona MD

## 2021-11-02 NOTE — DIETITIAN INITIAL EVALUATION ADULT. - MALNUTRITION
mild malnutrition in context of chronic illness moderate malnutrition in context of acute illness Moderate malnutrition in context of acute illness

## 2021-11-02 NOTE — DIETITIAN INITIAL EVALUATION ADULT. - PERTINENT MEDS FT
MEDICATIONS  (STANDING):  amLODIPine   Tablet 10 milliGRAM(s) Oral daily  cefTRIAXone   IVPB 2000 milliGRAM(s) IV Intermittent every 24 hours  febuxostat 40 milliGRAM(s) Oral daily  hydrALAZINE 25 milliGRAM(s) Oral every 8 hours  influenza   Vaccine 0.5 milliLiter(s) IntraMuscular once  metroNIDAZOLE    Tablet 500 milliGRAM(s) Oral every 8 hours  senna 2 Tablet(s) Oral at bedtime  sodium bicarbonate 650 milliGRAM(s) Oral three times a day    MEDICATIONS  (PRN):  bisacodyl 5 milliGRAM(s) Oral every 12 hours PRN Constipation  morphine  - Injectable 2 milliGRAM(s) IV Push every 6 hours PRN Severe Pain (7 - 10)  traMADol 50 milliGRAM(s) Oral every 6 hours PRN Moderate Pain (4 - 6)

## 2021-11-02 NOTE — PROGRESS NOTE ADULT - ASSESSMENT
66M with no reported PMH who was sent to the ED for abnormal labs. Patient found to have severe gouty changes with high uric acid levels and is in renal failure with creatinine ~9 hand xray shows severe gouty changes  MRI right foot with osteomyelitis of 2nd phalanges     1. Osteomyelitis  MRI R foot: OM of the 2nd prox and middle phalanges.  cx klebsiella and bacteroides  abx changed to rocephin and flagyl.  Pt is now reconsidering undergoing amputation, notified podiatry resident.  ID on board  Podiatry on board   RAVINDRA as above     2. GWEN  r/o urate nephropathy, ? paraprotein dx  elev Kappa/ lambda, most likely due to kidney injury, ratio is Normal, f/u SPEP, JOHN, bence seth  still pending   Nephrology on board  Renal bx on today   11/2/2021 f/u renal biopsy     3. Hyperkalemia/ mild acidosis  in the [facility] GWEN  c/w lokelma, NAHCO3  Nephrology on board   resolved hyperkalemia    4. Gout  c/w allopurinol pain meds, superimposed infection  get  rheumatology consult    10/31/2021 consult noted     5. Anemia of chronic disease.  could be sec RF vs r/o myeloma  awaiting SPEP and JHON and bence seth  ( still pending on 11/2/2021)  pt denies any melena, hematochezia, hematemesis  f/u stool occult ( was canceled by lab and no one informed MD ), will reorder    s/p 2 units prbc since admission  on 10/28 and again 10/29/2021 monitor hgb   10/31/2021 hgb low but stable  11/2/2021 low but stable     6. HTN  started on amlodipine.  10/30/2021 increase Norvasc  10/31/2021 Bp still elevated adding hydralazine  11/1/2021 uptitrate hydralazine as needed

## 2021-11-02 NOTE — PROGRESS NOTE ADULT - ASSESSMENT
66M with no reported PMH who was sent to the ED for abnormal labs.   Patient found to have severe gouty changes with high uric acid levels and is in renal failure with creatinine ~9   hand xray shows severe gouty changes  MRI right foot with osteomyelitis of 2nd phalanges  unclear etiology yet of renal failure but ould be related to severe untreated gout   low term antibiotics might be difficult in an individual who hasn't taken care of himself properly in 6 years. In this case, assuming the MRI findings are infectious and not related to rheumatologic disorder, it would be ideal to undergo amputation. would need to assess ability to heal and will order ravindra.pvr.     10/28: MRI with Osteomyelitis of the right second proximal and middle phalanges, pt is thinking about his options, would like to talk to his son. The pt is afebrile, on RA, no leukocytosis, anemic, wound culture is growing Klebsiella, Vancomycin trough is 11.2, give one time dose of Vancomycin 1250 mg IV now, will repeat vancomycin trough in 23-24 hrs, cefepime renally dosed continued.   10/29: decided to reconsider amputation, cultures with klebsiella and bacteroides thus have changed to ceftriaxone and PO flagyl, renal biopsy possibly on Monday 11/1: s/p renal biopsy today. patient's gout is quite extensive and many of the bony changes seen on imaging may mimic infection such  as osteomyelitis. Patient never had fevers, denies trauma, has negative blood cultures. Per podiatry and rheum these changes are more consistent with his hx of gout rather then infection. It would seem reasonable to treat Mr. Ulloa for a skin and soft tissue infection for 10-14 days at which point stop antibiotics and focus on treating his gout. he is taking alluporinol as well as renally dosed colchicine with plan to treat out-patient with specialized infusions as per rheumatology. while inpatient can continue IV antibiotics though can likely be transitioned to augmentin to complete 14 days of antibiotics.      11/2: remains afebrile, no leukocytosis, renal biopsy is pending result, Ceftriaxone and Flagyl continued while inpatient, will change abx to Augmentin upon discharge to complete 14 days course total.       osteomyelitis  Chronic kidney disease  Gout   therapeutic drug monitoring required with IV vancomycin      Plan:  continue ceftriaxone 2g q24hrs  d/c planning: Augmentin 500mg q12hrs (renally dosed) until 11/10/21  continue PO flagyl   blood cultures NGTD   RAVINDRA/PVR- some mild disease but overall good vasculature     #Kidney disease  nephro following  s/p renal biopsy-f/u pathology  follow up on all labs sent by nephro and rheumatology   avoid nephrotoxic drugs    #Gout  allopurinol   education on diet and which foods/food categories to avoid  consider dietician consultation   Rheumatology on board

## 2021-11-02 NOTE — PROGRESS NOTE ADULT - SUBJECTIVE AND OBJECTIVE BOX
WELLSMARLA III  MRN-[id number]    Follow Up: right foot wound infection     Interval History: The pt was seen and examined earlier, no distress, no new complains, in a good mood, s/p renal biopsy yesterday. The pt is afebrile, on RA, no leukocytosis.     PAST MEDICAL & SURGICAL HISTORY:  No pertinent past medical history        ROS:    [ ] Unobtainable because:  [x ] All other systems negative    Constitutional: no fever, no chills  Head: no trauma  Eyes: no vision changes, no eye pain  ENT:  no sore throat, no rhinorrhea  Cardiovascular:  no chest pain, no palpitation  Respiratory:  no SOB, no cough  GI:  no abd pain, no vomiting, no diarrhea  urinary: no dysuria, no hematuria, no flank pain  musculoskeletal:  no joint pain, no joint swelling  skin:  no rash  neurology:  no headache, no seizure, no change in mental status  psych: no anxiety, no depression         Allergies  No Known Allergies        ANTIMICROBIALS:  cefTRIAXone   IVPB 2000 every 24 hours  metroNIDAZOLE    Tablet 500 every 8 hours      OTHER MEDS:  amLODIPine   Tablet 10 milliGRAM(s) Oral daily  bisacodyl 5 milliGRAM(s) Oral every 12 hours PRN  dextrose 5% 1000 milliLiter(s) IV Continuous <Continuous>  epoetin marilynn-epbx (RETACRIT) Injectable 04171 Unit(s) SubCutaneous once  febuxostat 40 milliGRAM(s) Oral daily  hydrALAZINE 25 milliGRAM(s) Oral every 8 hours  influenza   Vaccine 0.5 milliLiter(s) IntraMuscular once  morphine  - Injectable 2 milliGRAM(s) IV Push every 6 hours PRN  pantoprazole   Suspension 40 milliGRAM(s) Oral daily  predniSONE   Tablet 20 milliGRAM(s) Oral daily  senna 2 Tablet(s) Oral at bedtime  sodium bicarbonate 650 milliGRAM(s) Oral three times a day  traMADol 50 milliGRAM(s) Oral every 6 hours PRN      Vital Signs Last 24 Hrs  T(C): 36.9 (02 Nov 2021 11:28), Max: 37.2 (01 Nov 2021 23:45)  T(F): 98.4 (02 Nov 2021 11:28), Max: 98.9 (01 Nov 2021 23:45)  HR: 83 (02 Nov 2021 13:50) (83 - 98)  BP: 116/56 (02 Nov 2021 13:50) (116/56 - 166/76)  BP(mean): --  RR: 17 (02 Nov 2021 11:28) (17 - 17)  SpO2: 97% (02 Nov 2021 11:42) (96% - 100%)    Physical Exam:  Constitutional: non-toxic, no distress  HEAD/EYES: anicteric, no conjunctival injection, congenital malformation of both eyes   ENT:  supple, no thrush, poor dentition with broken teeth and multiple carries   Cardiovascular:   normal S1, S2, no murmur, no edema  Respiratory:  clear BS bilaterally, no wheezes, no rales  GI:  soft, non-tender, normal bowel sounds  :  no arellano, no CVA tenderness  Musculoskeletal:  no synovitis, normal ROM, tophaceous gouty changes of all 10 fingers with right index finger having tophi eroding through the skin, feet wrapped bilaterally   Neurologic: awake and alert, normal strength, no focal findings  Skin:  no rash, no erythema, no phlebitis, dressing on back c/d/i  Heme/Onc: no cervical  lymphadenopathy   Psychiatric:  awake, alert, appropriate mood    WBC Count: 9.95 K/uL (11-02 @ 06:31)  WBC Count: 8.38 K/uL (11-01 @ 23:23)  WBC Count: 9.34 K/uL (10-31 @ 09:19)  WBC Count: 6.81 K/uL (10-30 @ 08:17)  WBC Count: 6.72 K/uL (10-29 @ 07:44)  WBC Count: 6.36 K/uL (10-28 @ 16:15)  WBC Count: 6.92 K/uL (10-28 @ 11:32)                            8.3    9.95  )-----------( 456      ( 02 Nov 2021 06:31 )             26.3       11-02    134<L>  |  101  |  77<H>  ----------------------------<  102<H>  4.7   |  20<L>  |  8.31<H>    Ca    11.5<H>      02 Nov 2021 06:31            Creatinine Trend: 8.31<--, 8.34<--, 8.34<--, 8.39<--, 8.55<--, 9.04<--      MICROBIOLOGY:  v  .Tissue R foot  10-27-21   Numerous Klebsiella oxytoca/Raoutella ornithinolytica  Few Bacteroides ovatus group  Rare Coag Negative Staphylococcus "Susceptibilities not performed"  --  Klebsiella oxytoca /Raoutella ornithinolytica      Clean Catch Clean Catch (Midstream)  10-27-21   <10,000 CFU/mL Normal Urogenital Meenu  --  --      .Blood Blood  10-27-21   No Growth Final  --  --          Rapid RVP Result: Yumiko (10-27 @ 05:27)        C-Reactive Protein, Serum: 75 (10-27)    Ferritin, Serum: 251 (10-29)          SARS-CoV-2: Yumiko (27 Oct 2021 05:27)    RADIOLOGY:

## 2021-11-02 NOTE — DIETITIAN INITIAL EVALUATION ADULT. - OTHER CALCULATIONS
Ht (cm):   177.8      Wt (kg):  71.6    BMI:  22.6         IBW: 75.4kg         %IBW: 107           UBW: unknown        %UBW: unknown

## 2021-11-03 ENCOUNTER — FORM ENCOUNTER (OUTPATIENT)
Age: 66
End: 2021-11-03

## 2021-11-03 VITALS
HEART RATE: 78 BPM | DIASTOLIC BLOOD PRESSURE: 67 MMHG | RESPIRATION RATE: 16 BRPM | SYSTOLIC BLOOD PRESSURE: 140 MMHG | OXYGEN SATURATION: 100 % | TEMPERATURE: 98 F

## 2021-11-03 LAB
% ALBUMIN: 44.4 % — SIGNIFICANT CHANGE UP
% ALPHA 1: 8.4 % — SIGNIFICANT CHANGE UP
% ALPHA 2: 11.5 % — SIGNIFICANT CHANGE UP
% BETA: 13.6 % — SIGNIFICANT CHANGE UP
% GAMMA: 22.1 % — SIGNIFICANT CHANGE UP
ALBUMIN SERPL ELPH-MCNC: 2.2 G/DL — LOW (ref 3.3–5)
ALBUMIN SERPL ELPH-MCNC: 2.7 G/DL — LOW (ref 3.6–5.5)
ALBUMIN/GLOB SERPL ELPH: 0.8 RATIO — SIGNIFICANT CHANGE UP
ALPHA1 GLOB SERPL ELPH-MCNC: 0.5 G/DL — HIGH (ref 0.1–0.4)
ALPHA2 GLOB SERPL ELPH-MCNC: 0.7 G/DL — SIGNIFICANT CHANGE UP (ref 0.5–1)
ANION GAP SERPL CALC-SCNC: 13 MMOL/L — SIGNIFICANT CHANGE UP (ref 5–17)
B-GLOBULIN SERPL ELPH-MCNC: 0.8 G/DL — SIGNIFICANT CHANGE UP (ref 0.5–1)
BUN SERPL-MCNC: 74 MG/DL — HIGH (ref 7–23)
CALCIUM SERPL-MCNC: 11.2 MG/DL — HIGH (ref 8.5–10.1)
CHLORIDE SERPL-SCNC: 98 MMOL/L — SIGNIFICANT CHANGE UP (ref 96–108)
CO2 SERPL-SCNC: 22 MMOL/L — SIGNIFICANT CHANGE UP (ref 22–31)
CREAT SERPL-MCNC: 7.8 MG/DL — HIGH (ref 0.5–1.3)
FLUAV AG NPH QL: SIGNIFICANT CHANGE UP
FLUBV AG NPH QL: SIGNIFICANT CHANGE UP
GAMMA GLOBULIN: 1.3 G/DL — SIGNIFICANT CHANGE UP (ref 0.6–1.6)
GLUCOSE SERPL-MCNC: 156 MG/DL — HIGH (ref 70–99)
HCT VFR BLD CALC: 25.8 % — LOW (ref 39–50)
HGB BLD-MCNC: 8.3 G/DL — LOW (ref 13–17)
INTERPRETATION SERPL IFE-IMP: SIGNIFICANT CHANGE UP
MAGNESIUM SERPL-MCNC: 2.1 MG/DL — SIGNIFICANT CHANGE UP (ref 1.6–2.6)
MCHC RBC-ENTMCNC: 26.6 PG — LOW (ref 27–34)
MCHC RBC-ENTMCNC: 32.2 GM/DL — SIGNIFICANT CHANGE UP (ref 32–36)
MCV RBC AUTO: 82.7 FL — SIGNIFICANT CHANGE UP (ref 80–100)
NRBC # BLD: 0 /100 WBCS — SIGNIFICANT CHANGE UP (ref 0–0)
OB PNL STL: NEGATIVE — SIGNIFICANT CHANGE UP
PHOSPHATE SERPL-MCNC: 5.3 MG/DL — HIGH (ref 2.5–4.5)
PLATELET # BLD AUTO: 471 K/UL — HIGH (ref 150–400)
POTASSIUM SERPL-MCNC: 4.5 MMOL/L — SIGNIFICANT CHANGE UP (ref 3.5–5.3)
POTASSIUM SERPL-SCNC: 4.5 MMOL/L — SIGNIFICANT CHANGE UP (ref 3.5–5.3)
PROT PATTERN SERPL ELPH-IMP: SIGNIFICANT CHANGE UP
RBC # BLD: 3.12 M/UL — LOW (ref 4.2–5.8)
RBC # FLD: 17.7 % — HIGH (ref 10.3–14.5)
SARS-COV-2 RNA SPEC QL NAA+PROBE: SIGNIFICANT CHANGE UP
SODIUM SERPL-SCNC: 133 MMOL/L — LOW (ref 135–145)
URATE SERPL-MCNC: 9.5 MG/DL — HIGH (ref 3.4–8.8)
WBC # BLD: 5.17 K/UL — SIGNIFICANT CHANGE UP (ref 3.8–10.5)
WBC # FLD AUTO: 5.17 K/UL — SIGNIFICANT CHANGE UP (ref 3.8–10.5)

## 2021-11-03 PROCEDURE — 99239 HOSP IP/OBS DSCHRG MGMT >30: CPT

## 2021-11-03 PROCEDURE — 99232 SBSQ HOSP IP/OBS MODERATE 35: CPT

## 2021-11-03 RX ORDER — TRAMADOL HYDROCHLORIDE 50 MG/1
1 TABLET ORAL
Qty: 20 | Refills: 0
Start: 2021-11-03 | End: 2021-11-07

## 2021-11-03 RX ORDER — HYDRALAZINE HCL 50 MG
1 TABLET ORAL
Qty: 45 | Refills: 0
Start: 2021-11-03 | End: 2021-11-17

## 2021-11-03 RX ORDER — FEBUXOSTAT 40 MG/1
1 TABLET ORAL
Qty: 0 | Refills: 0 | DISCHARGE
Start: 2021-11-03

## 2021-11-03 RX ORDER — BUMETANIDE 0.25 MG/ML
1 INJECTION INTRAMUSCULAR; INTRAVENOUS
Qty: 30 | Refills: 0
Start: 2021-11-03 | End: 2021-12-02

## 2021-11-03 RX ORDER — PANTOPRAZOLE SODIUM 20 MG/1
1 TABLET, DELAYED RELEASE ORAL
Qty: 15 | Refills: 0
Start: 2021-11-03 | End: 2021-11-17

## 2021-11-03 RX ORDER — HYDRALAZINE HCL 50 MG
1 TABLET ORAL
Qty: 0 | Refills: 0 | DISCHARGE
Start: 2021-11-03

## 2021-11-03 RX ORDER — BUMETANIDE 0.25 MG/ML
1 INJECTION INTRAMUSCULAR; INTRAVENOUS DAILY
Refills: 0 | Status: DISCONTINUED | OUTPATIENT
Start: 2021-11-03 | End: 2021-11-03

## 2021-11-03 RX ORDER — AMLODIPINE BESYLATE 2.5 MG/1
1 TABLET ORAL
Qty: 30 | Refills: 0
Start: 2021-11-03 | End: 2021-12-02

## 2021-11-03 RX ORDER — SENNA PLUS 8.6 MG/1
2 TABLET ORAL
Qty: 0 | Refills: 0 | DISCHARGE
Start: 2021-11-03

## 2021-11-03 RX ORDER — SODIUM BICARBONATE 1 MEQ/ML
1 SYRINGE (ML) INTRAVENOUS
Qty: 21 | Refills: 0
Start: 2021-11-03 | End: 2021-11-09

## 2021-11-03 RX ORDER — METRONIDAZOLE 500 MG
1 TABLET ORAL
Qty: 90 | Refills: 0
Start: 2021-11-03 | End: 2021-12-02

## 2021-11-03 RX ADMIN — BUMETANIDE 1 MILLIGRAM(S): 0.25 INJECTION INTRAMUSCULAR; INTRAVENOUS at 15:44

## 2021-11-03 RX ADMIN — Medication 500 MILLIGRAM(S): at 13:09

## 2021-11-03 RX ADMIN — Medication 500 MILLIGRAM(S): at 06:29

## 2021-11-03 RX ADMIN — CEFTRIAXONE 100 MILLIGRAM(S): 500 INJECTION, POWDER, FOR SOLUTION INTRAMUSCULAR; INTRAVENOUS at 11:33

## 2021-11-03 RX ADMIN — Medication 650 MILLIGRAM(S): at 13:09

## 2021-11-03 RX ADMIN — Medication 25 MILLIGRAM(S): at 13:09

## 2021-11-03 RX ADMIN — Medication 650 MILLIGRAM(S): at 06:29

## 2021-11-03 RX ADMIN — Medication 40 MILLIGRAM(S): at 06:29

## 2021-11-03 RX ADMIN — AMLODIPINE BESYLATE 10 MILLIGRAM(S): 2.5 TABLET ORAL at 06:29

## 2021-11-03 RX ADMIN — Medication 25 MILLIGRAM(S): at 06:29

## 2021-11-03 RX ADMIN — FEBUXOSTAT 40 MILLIGRAM(S): 40 TABLET ORAL at 11:33

## 2021-11-03 RX ADMIN — PANTOPRAZOLE SODIUM 40 MILLIGRAM(S): 20 TABLET, DELAYED RELEASE ORAL at 11:33

## 2021-11-03 NOTE — PROGRESS NOTE ADULT - ASSESSMENT
66M with no reported PMH who was sent to the ED for abnormal labs.   Patient found to have severe gouty changes with high uric acid levels and is in renal failure with creatinine ~9   hand xray shows severe gouty changes  MRI right foot with osteomyelitis of 2nd phalanges  unclear etiology yet of renal failure but ould be related to severe untreated gout   low term antibiotics might be difficult in an individual who hasn't taken care of himself properly in 6 years. In this case, assuming the MRI findings are infectious and not related to rheumatologic disorder, it would be ideal to undergo amputation. would need to assess ability to heal and will order ravindra.pvr.     10/28: MRI with Osteomyelitis of the right second proximal and middle phalanges, pt is thinking about his options, would like to talk to his son. The pt is afebrile, on RA, no leukocytosis, anemic, wound culture is growing Klebsiella, Vancomycin trough is 11.2, give one time dose of Vancomycin 1250 mg IV now, will repeat vancomycin trough in 23-24 hrs, cefepime renally dosed continued.   10/29: decided to reconsider amputation, cultures with klebsiella and bacteroides thus have changed to ceftriaxone and PO flagyl, renal biopsy possibly on Monday 11/1: s/p renal biopsy today. patient's gout is quite extensive and many of the bony changes seen on imaging may mimic infection such  as osteomyelitis. Patient never had fevers, denies trauma, has negative blood cultures. Per podiatry and rheum these changes are more consistent with his hx of gout rather then infection. It would seem reasonable to treat Mr. Ulloa for a skin and soft tissue infection for 10-14 days at which point stop antibiotics and focus on treating his gout. he is taking alluporinol as well as renally dosed colchicine with plan to treat out-patient with specialized infusions as per rheumatology. while inpatient can continue IV antibiotics though can likely be transitioned to augmentin to complete 14 days of antibiotics.      11/2: remains afebrile, no leukocytosis, renal biopsy is pending result, Ceftriaxone and Flagyl continued while inpatient, will change abx to Augmentin upon discharge to complete 14 days course total.   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 Pt will follow up with nephrology as an op.     osteomyelitis  Chronic kidney disease  Gout   therapeutic drug monitoring required with IV vancomycin      Plan:  continue ceftriaxone 2g q24hrs while in pt  d/c planning: Augmentin 500mg q12hrs (renally dosed) until 11/10/21  continue PO flagyl while in pt  blood cultures NGTD   RAVINDRA/PVR- some mild disease but overall good vasculature     #Kidney disease  nephro following  s/p renal biopsy  follow up on all labs sent by nephro and rheumatology   avoid nephrotoxic drugs    #Gout  allopurinol   education on diet and which foods/food categories to avoid  consider dietician consultation   Rheumatology on board  66M with no reported PMH who was sent to the ED for abnormal labs.   Patient found to have severe gouty changes with high uric acid levels and is in renal failure with creatinine ~9   hand xray shows severe gouty changes  MRI right foot with osteomyelitis of 2nd phalanges  unclear etiology yet of renal failure but ould be related to severe untreated gout   low term antibiotics might be difficult in an individual who hasn't taken care of himself properly in 6 years. In this case, assuming the MRI findings are infectious and not related to rheumatologic disorder, it would be ideal to undergo amputation. would need to assess ability to heal and will order ravindra.pvr.     10/28: MRI with Osteomyelitis of the right second proximal and middle phalanges, pt is thinking about his options, would like to talk to his son. The pt is afebrile, on RA, no leukocytosis, anemic, wound culture is growing Klebsiella, Vancomycin trough is 11.2, give one time dose of Vancomycin 1250 mg IV now, will repeat vancomycin trough in 23-24 hrs, cefepime renally dosed continued.   10/29: decided to reconsider amputation, cultures with klebsiella and bacteroides thus have changed to ceftriaxone and PO flagyl, renal biopsy possibly on Monday 11/1: s/p renal biopsy today. patient's gout is quite extensive and many of the bony changes seen on imaging may mimic infection such  as osteomyelitis. Patient never had fevers, denies trauma, has negative blood cultures. Per podiatry and rheum these changes are more consistent with his hx of gout rather then infection. It would seem reasonable to treat Mr. Ulloa for a skin and soft tissue infection for 10-14 days at which point stop antibiotics and focus on treating his gout. he is taking alluporinol as well as renally dosed colchicine with plan to treat out-patient with specialized infusions as per rheumatology. while inpatient can continue IV antibiotics though can likely be transitioned to augmentin to complete 14 days of antibiotics.      11/2: remains afebrile, no leukocytosis, renal biopsy is pending result, Ceftriaxone and Flagyl continued while inpatient, will change abx to Augmentin upon discharge to complete 14 days course total.   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 started on steroids by nephrology. Pt will follow up with nephrology as an op.     osteomyelitis  Chronic kidney disease  Gout   therapeutic drug monitoring required with IV vancomycin      Plan:  continue ceftriaxone 2g q24hrs while in pt  d/c planning: Augmentin 500mg q12hrs (renally dosed) until 11/10/21  continue PO flagyl while in pt  blood cultures NGTD   RAVINDRA/PVR- some mild disease but overall good vasculature     #Kidney disease  nephro following  s/p renal biopsy  follow up on all labs sent by nephro and rheumatology   avoid nephrotoxic drugs    #Gout  allopurinol   education on diet and which foods/food categories to avoid  consider dietician consultation   Rheumatology on board

## 2021-11-03 NOTE — PROGRESS NOTE ADULT - SUBJECTIVE AND OBJECTIVE BOX
MARLA WELLS III  MRN-[id number]    Follow Up:  right foot infected wound    Interval History: The pt was seen and examined earlier, no distress, no new complains, eager to go home. The pt is afebrile, on RA, no WBC.    PAST MEDICAL & SURGICAL HISTORY:  No pertinent past medical history        ROS:    [ ] Unobtainable because:  [ x] All other systems negative    Constitutional: no fever, no chills  Head: no trauma  Eyes: no vision changes, no eye pain  ENT:  no sore throat, no rhinorrhea  Cardiovascular:  no chest pain, no palpitation  Respiratory:  no SOB, no cough  GI:  no abd pain, no vomiting, no diarrhea  urinary: no dysuria, no hematuria, no flank pain  musculoskeletal:  bilateral feet pain secondary to GOUT, better   skin:  no rash  neurology:  no headache, no seizure, no change in mental status  psych: no anxiety, no depression         Allergies  No Known Allergies        ANTIMICROBIALS:  cefTRIAXone   IVPB 2000 every 24 hours  metroNIDAZOLE    Tablet 500 every 8 hours      OTHER MEDS:  amLODIPine   Tablet 10 milliGRAM(s) Oral daily  bisacodyl 5 milliGRAM(s) Oral every 12 hours PRN  buMETAnide 1 milliGRAM(s) Oral daily  epoetin marilynn-epbx (RETACRIT) Injectable 87335 Unit(s) SubCutaneous once  febuxostat 40 milliGRAM(s) Oral daily  hydrALAZINE 25 milliGRAM(s) Oral every 8 hours  influenza   Vaccine 0.5 milliLiter(s) IntraMuscular once  morphine  - Injectable 2 milliGRAM(s) IV Push every 6 hours PRN  pantoprazole   Suspension 40 milliGRAM(s) Oral daily  predniSONE   Tablet 40 milliGRAM(s) Oral daily  senna 2 Tablet(s) Oral at bedtime  sodium bicarbonate 650 milliGRAM(s) Oral three times a day  traMADol 50 milliGRAM(s) Oral every 6 hours PRN      Vital Signs Last 24 Hrs  T(C): 36.7 (03 Nov 2021 12:26), Max: 36.8 (02 Nov 2021 16:41)  T(F): 98 (03 Nov 2021 12:26), Max: 98.3 (02 Nov 2021 16:41)  HR: 88 (03 Nov 2021 14:40) (76 - 97)  BP: 139/63 (03 Nov 2021 14:40) (134/57 - 151/72)  BP(mean): --  RR: 17 (03 Nov 2021 12:26) (17 - 17)  SpO2: 100% (03 Nov 2021 14:40) (99% - 100%)    Physical Exam:  Constitutional: non-toxic, no distress  HEAD/EYES: anicteric, no conjunctival injection, congenital malformation of both eyes   ENT:  supple, no thrush, poor dentition with broken teeth and multiple carries   Cardiovascular:   normal S1, S2, no murmur, no edema  Respiratory:  clear BS bilaterally, no wheezes, no rales  GI:  soft, non-tender, normal bowel sounds  :  no arellano, no CVA tenderness  Musculoskeletal:  no synovitis, normal ROM, tophaceous gouty changes of all 10 fingers with right index finger having tophi eroding through the skin, feet wrapped bilaterally   Neurologic: awake and alert, normal strength, no focal findings  Skin:  no rash, no erythema, no phlebitis  Heme/Onc: no cervical  lymphadenopathy   Psychiatric:  awake, alert, appropriate mood    WBC Count: 5.17 K/uL (11-03 @ 08:02)  WBC Count: 9.95 K/uL (11-02 @ 06:31)  WBC Count: 8.38 K/uL (11-01 @ 23:23)  WBC Count: 9.34 K/uL (10-31 @ 09:19)  WBC Count: 6.81 K/uL (10-30 @ 08:17)  WBC Count: 6.72 K/uL (10-29 @ 07:44)  WBC Count: 6.36 K/uL (10-28 @ 16:15)                            8.3    5.17  )-----------( 471      ( 03 Nov 2021 08:02 )             25.8       11-03    133<L>  |  98  |  74<H>  ----------------------------<  156<H>  4.5   |  22  |  7.80<H>    Ca    11.2<H>      03 Nov 2021 08:02  Phos  5.3     11-03  Mg     2.1     11-03    TPro  x   /  Alb  2.2<L>  /  TBili  x   /  DBili  x   /  AST  x   /  ALT  x   /  AlkPhos  x   11-03          Creatinine Trend: 7.80<--, 8.31<--, 8.34<--, 8.34<--, 8.39<--, 8.55<--      MICROBIOLOGY:  v  .Tissue R foot  10-27-21   Numerous Klebsiella oxytoca/Raoutella ornithinolytica  Few Bacteroides ovatus group  Rare Coag Negative Staphylococcus "Susceptibilities not performed"  --  Klebsiella oxytoca /Raoutella ornithinolytica      Clean Catch Clean Catch (Midstream)  10-27-21   <10,000 CFU/mL Normal Urogenital Meenu  --  --      .Blood Blood  10-27-21   No Growth Final  --  --      C-Reactive Protein, Serum: 75 (10-27)    Ferritin, Serum: 251 (10-29)          SARS-CoV-2: Yumiko (27 Oct 2021 05:27)    RADIOLOGY:

## 2021-11-03 NOTE — DISCHARGE NOTE NURSING/CASE MANAGEMENT/SOCIAL WORK - PATIENT PORTAL LINK FT
You can access the FollowMyHealth Patient Portal offered by [facility] by registering at the following website: [email or web address]. By joining [facility]’s FollowMyHealth portal, you will also be able to view your health information using other applications (apps) compatible with our system.

## 2021-11-03 NOTE — PROGRESS NOTE ADULT - SUBJECTIVE AND OBJECTIVE BOX
[facility] NEPHROLOGY SERVICES, [facility]  NEPHROLOGY AND HYPERTENSION  [address]  [address]  [place] [address]  [phone]    MD ELLI WHITTAKER, MD VANDANA BULLARD, MD ROBBI LUCERO, MD RUBEN SOLORZANO, MD JANENE MATTA MD          Patient events noted  feels well    MEDICATIONS  (STANDING):  amLODIPine   Tablet 10 milliGRAM(s) Oral daily  buMETAnide 1 milliGRAM(s) Oral daily  cefTRIAXone   IVPB 2000 milliGRAM(s) IV Intermittent every 24 hours  epoetin marilynn-epbx (RETACRIT) Injectable 30490 Unit(s) SubCutaneous once  febuxostat 40 milliGRAM(s) Oral daily  hydrALAZINE 25 milliGRAM(s) Oral every 8 hours  influenza   Vaccine 0.5 milliLiter(s) IntraMuscular once  metroNIDAZOLE    Tablet 500 milliGRAM(s) Oral every 8 hours  pantoprazole   Suspension 40 milliGRAM(s) Oral daily  predniSONE   Tablet 40 milliGRAM(s) Oral daily  senna 2 Tablet(s) Oral at bedtime  sodium bicarbonate 650 milliGRAM(s) Oral three times a day    MEDICATIONS  (PRN):  bisacodyl 5 milliGRAM(s) Oral every 12 hours PRN Constipation  morphine  - Injectable 2 milliGRAM(s) IV Push every 6 hours PRN Severe Pain (7 - 10)  traMADol 50 milliGRAM(s) Oral every 6 hours PRN Moderate Pain (4 - 6)      11-02-21 @ 07:01  -  11-03-21 @ 07:00  --------------------------------------------------------  IN: 760 mL / OUT: 450 mL / NET: 310 mL    11-03-21 @ 07:01  -  11-03-21 @ 23:51  --------------------------------------------------------  IN: 960 mL / OUT: 0 mL / NET: 960 mL      PHYSICAL EXAM:      T(C): 36.6 (11-03-21 @ 16:39), Max: 36.8 (11-03-21 @ 00:20)  HR: 78 (11-03-21 @ 16:39) (78 - 97)  BP: 140/67 (11-03-21 @ 16:39) (134/57 - 151/72)  RR: 16 (11-03-21 @ 16:39) (16 - 17)  SpO2: 100% (11-03-21 @ 16:39) (100% - 100%)  Wt(kg): --  Lungs clear  Heart S1S2  Abd soft NT ND  Extremities:   2 edema                                    8.3    5.17  )-----------( 471      ( 03 Nov 2021 08:02 )             25.8     11-03    133<L>  |  98  |  74<H>  ----------------------------<  156<H>  4.5   |  22  |  7.80<H>    Ca    11.2<H>      03 Nov 2021 08:02  Phos  5.3     11-03  Mg     2.1     11-03    TPro  x   /  Alb  2.2<L>  /  TBili  x   /  DBili  x   /  AST  x   /  ALT  x   /  AlkPhos  x   11-03      LIVER FUNCTIONS - ( 03 Nov 2021 08:02 )  Alb: 2.2 g/dL / Pro: x     / ALK PHOS: x     / ALT: x     / AST: x     / GGT: x           Creatinine Trend: 7.80<--, 8.31<--, 8.34<--, 8.34<--, 8.39<--, 8.55<--      Assessment   GWEN CKD, degree unclear   Renal Bx c/w urate nephropathy; underlying interstitial nephritis   Unclear chronicity as sampling poor   Hypercalcemia; unclear etiology await serum IF and urine BJ;   Renal indices remain stable, albeit severely impaired.     Plan:  Add Bumex 1 mg daily  Follow pthrp;   Prednisone 40 mg daily   Will follow with me as outpatient     Marcel Carmona MD

## 2021-11-03 NOTE — PROGRESS NOTE ADULT - ATTENDING COMMENTS
Discussed with patient all risk benefits and alternatives  PAtient having a lot of anxiety about amputation  Bone destruction probably more from gout  PAtient will discuss with son this evening
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 Kristine Marie, DO  Infectious Disease Attending  Pager [phone]  After 5pm/weekends please call [phone] for all inquiries and new consults
patient doing well   creatinine getting better   steroids started for interstitial nephritis   gout will be treated with colchicine, allopurinol and the steroids as well   needs to follow up with rheum outpatient with Dr. Brown    D/W Dr. Kristine Marie,   Infectious Disease Attending  Pager [phone]  After 5pm/weekends please call [phone] for all inquiries and new consults
Do not recommend toe Amp  Destruction of bone more from gouty arthritis  Will cont local woundcare
Will follow up in the wound care center
agree with above plan  if proceeds with amputation, hopefully can avoid long term antibiotics   continue antibiotics for now  continue treatment for severe gout  consider Rheumatology consultation   needs improvement in kidney funtion    Immanuel Marie DO  Infectious Disease Attending  Pager [phone]  After 5pm/weekends please call [phone] for all inquiries and new consults

## 2021-11-03 NOTE — PROGRESS NOTE ADULT - REASON FOR ADMISSION
renal failure, hand/foot swelling

## 2021-11-03 NOTE — PROGRESS NOTE ADULT - SUBJECTIVE AND OBJECTIVE BOX
Patient is a 66y old  Male who presents with a chief complaint of renal failure, hand/foot swelling (02 Nov 2021 15:44)       INTERVAL HPI/OVERNIGHT EVENTS:  Patient seen and evaluated at bedside.  Pt is resting comfortable in NAD. Denies N/V/F/C.    Allergies    No Known Allergies    Intolerances        Vital Signs Last 24 Hrs  T(C): 36.8 (03 Nov 2021 05:07), Max: 36.9 (02 Nov 2021 11:28)  T(F): 98.3 (03 Nov 2021 05:07), Max: 98.4 (02 Nov 2021 11:28)  HR: 86 (03 Nov 2021 05:07) (76 - 97)  BP: 151/72 (03 Nov 2021 05:07) (116/56 - 151/72)  BP(mean): --  RR: 17 (03 Nov 2021 05:07) (17 - 17)  SpO2: 100% (03 Nov 2021 05:07) (97% - 100%)    LABS:                        8.3    5.17  )-----------( 471      ( 03 Nov 2021 08:02 )             25.8     11-03    133<L>  |  98  |  74<H>  ----------------------------<  156<H>  4.5   |  22  |  7.80<H>    Ca    11.2<H>      03 Nov 2021 08:02  Phos  5.3     11-03  Mg     2.1     11-03    TPro  x   /  Alb  2.2<L>  /  TBili  x   /  DBili  x   /  AST  x   /  ALT  x   /  AlkPhos  x   11-03        CAPILLARY BLOOD GLUCOSE          Lower Extremity Physical Exam:  Vascular: DP/PT 2/4, B/L, CFT <3 seconds B/L, Temperature gradient warm to cool, B/L.   Neuro: Epicritic sensation intact to the level of digits, B/L.  Musculoskeletal/Ortho: B/L HAV R>L, hammered and laterally deviated digits x10  Skin: R foot hallux wound to medial aspect of IPJ to bone with fibrous wound bed, no malodor or erythema,  R foot 2nd digit s/p bedside I&D, no pus, no further gouty tophi expressed    RADIOLOGY & ADDITIONAL TESTS:

## 2021-11-03 NOTE — PROGRESS NOTE ADULT - ASSESSMENT
66M s/p bedside I&D of R foot 2nd digit  - Afebrile, No leukocytosis  - R foot hallux wound to medial aspect of IPJ to bone with fibrous wound bed, no malodor or erythema, s/p 10/27 bedside R foot 2nd digit I&D, no pus, no further gouty tophi expressed  - R foot MRI reviewed shoring possible OM 2nd toe middle and proximal phalanx, erosions more likely due to gout rather than infectious process   - no podiatry surgical intervention at this time, erosions on MRI 2/2 gout rather than infectious process  - pod podiatry plan for local wound care  - ID recs PO Aug for total 14 days upon d/c  - pod stable for discharge, follow up information and instructions can be found in the follow up section of the provider d/c note   - discussed with attending

## 2021-11-03 NOTE — DISCHARGE NOTE NURSING/CASE MANAGEMENT/SOCIAL WORK - NSDCFUADDAPPT_GEN_ALL_CORE_FT
Podiatry Discharge Instructions:  Follow up: Please follow up with Dr. Piedra within 1 week of discharge from the hospital, please call [phone] for appointment and discuss that you recently were seen in the hospital.  Wound Care: Please apply mupirocin to R foot wounds and dress with gauze and cling.  Weight bearing: Please weight bear as tolerated in a surgical shoe.  Antibiotics: Please continue as instructed.

## 2021-11-04 LAB — SURGICAL PATHOLOGY STUDY: SIGNIFICANT CHANGE UP

## 2021-11-05 LAB
% ALBUMIN: 42.8 % — SIGNIFICANT CHANGE UP
% ALPHA 1: 8.9 % — SIGNIFICANT CHANGE UP
% ALPHA 2: 11.9 % — SIGNIFICANT CHANGE UP
% BETA: 14 % — SIGNIFICANT CHANGE UP
% GAMMA: 22.4 % — SIGNIFICANT CHANGE UP
ALBUMIN SERPL ELPH-MCNC: 2.8 G/DL — LOW (ref 3.6–5.5)
ALBUMIN/GLOB SERPL ELPH: 0.8 RATIO — SIGNIFICANT CHANGE UP
ALPHA1 GLOB SERPL ELPH-MCNC: 0.6 G/DL — HIGH (ref 0.1–0.4)
ALPHA2 GLOB SERPL ELPH-MCNC: 0.8 G/DL — SIGNIFICANT CHANGE UP (ref 0.5–1)
B-GLOBULIN SERPL ELPH-MCNC: 0.9 G/DL — SIGNIFICANT CHANGE UP (ref 0.5–1)
GAMMA GLOBULIN: 1.5 G/DL — SIGNIFICANT CHANGE UP (ref 0.6–1.6)
INTERPRETATION SERPL IFE-IMP: SIGNIFICANT CHANGE UP
INTERPRETATION SERPL IFE-IMP: SIGNIFICANT CHANGE UP
PROT PATTERN SERPL ELPH-IMP: SIGNIFICANT CHANGE UP

## 2021-11-08 ENCOUNTER — OUTPATIENT (OUTPATIENT)
Dept: OUTPATIENT SERVICES | Facility: HOSPITAL | Age: 66
LOS: 1 days | Discharge: HOME | End: 2021-11-08

## 2021-11-08 DIAGNOSIS — L89.899 PRESSURE ULCER OF OTHER SITE, UNSPECIFIED STAGE: ICD-10-CM

## 2021-11-08 LAB — PTH RELATED PROT SERPL-MCNC: <2 PMOL/L — SIGNIFICANT CHANGE UP

## 2021-11-09 DIAGNOSIS — Z79.52 LONG TERM (CURRENT) USE OF SYSTEMIC STEROIDS: ICD-10-CM

## 2021-11-09 DIAGNOSIS — M06.9 RHEUMATOID ARTHRITIS, UNSPECIFIED: ICD-10-CM

## 2021-11-09 DIAGNOSIS — Z79.891 LONG TERM (CURRENT) USE OF OPIATE ANALGESIC: ICD-10-CM

## 2021-11-09 DIAGNOSIS — M10.00 IDIOPATHIC GOUT, UNSPECIFIED SITE: ICD-10-CM

## 2021-11-09 DIAGNOSIS — Z87.891 PERSONAL HISTORY OF NICOTINE DEPENDENCE: ICD-10-CM

## 2021-11-09 DIAGNOSIS — R60.9 EDEMA, UNSPECIFIED: ICD-10-CM

## 2021-11-09 DIAGNOSIS — Z72.4 INAPPROPRIATE DIET AND EATING HABITS: ICD-10-CM

## 2021-11-09 DIAGNOSIS — H26.9 UNSPECIFIED CATARACT: ICD-10-CM

## 2021-11-09 DIAGNOSIS — Z91.81 HISTORY OF FALLING: ICD-10-CM

## 2021-11-09 DIAGNOSIS — Z97.3 PRESENCE OF SPECTACLES AND CONTACT LENSES: ICD-10-CM

## 2021-11-09 DIAGNOSIS — Z80.9 FAMILY HISTORY OF MALIGNANT NEOPLASM, UNSPECIFIED: ICD-10-CM

## 2021-11-09 DIAGNOSIS — L97.512 NON-PRESSURE CHRONIC ULCER OF OTHER PART OF RIGHT FOOT WITH FAT LAYER EXPOSED: ICD-10-CM

## 2021-11-09 DIAGNOSIS — H40.9 UNSPECIFIED GLAUCOMA: ICD-10-CM

## 2021-11-09 DIAGNOSIS — I10 ESSENTIAL (PRIMARY) HYPERTENSION: ICD-10-CM

## 2021-11-09 LAB — PTH RELATED PROT SERPL-MCNC: <2 PMOL/L — SIGNIFICANT CHANGE UP

## 2021-11-15 ENCOUNTER — OUTPATIENT (OUTPATIENT)
Dept: OUTPATIENT SERVICES | Facility: HOSPITAL | Age: 66
LOS: 1 days | Discharge: HOME | End: 2021-11-15

## 2021-11-15 DIAGNOSIS — L89.899 PRESSURE ULCER OF OTHER SITE, UNSPECIFIED STAGE: ICD-10-CM

## 2021-11-16 DIAGNOSIS — M10.00 IDIOPATHIC GOUT, UNSPECIFIED SITE: ICD-10-CM

## 2021-11-16 DIAGNOSIS — H40.9 UNSPECIFIED GLAUCOMA: ICD-10-CM

## 2021-11-16 DIAGNOSIS — I10 ESSENTIAL (PRIMARY) HYPERTENSION: ICD-10-CM

## 2021-11-16 DIAGNOSIS — L97.512 NON-PRESSURE CHRONIC ULCER OF OTHER PART OF RIGHT FOOT WITH FAT LAYER EXPOSED: ICD-10-CM

## 2021-11-16 DIAGNOSIS — Z91.81 HISTORY OF FALLING: ICD-10-CM

## 2021-11-16 DIAGNOSIS — M06.9 RHEUMATOID ARTHRITIS, UNSPECIFIED: ICD-10-CM

## 2021-11-16 DIAGNOSIS — L92.8 OTHER GRANULOMATOUS DISORDERS OF THE SKIN AND SUBCUTANEOUS TISSUE: ICD-10-CM

## 2021-11-16 DIAGNOSIS — Z79.52 LONG TERM (CURRENT) USE OF SYSTEMIC STEROIDS: ICD-10-CM

## 2021-11-16 DIAGNOSIS — Z72.4 INAPPROPRIATE DIET AND EATING HABITS: ICD-10-CM

## 2021-11-16 DIAGNOSIS — R60.9 EDEMA, UNSPECIFIED: ICD-10-CM

## 2021-11-16 DIAGNOSIS — Z87.39 PERSONAL HISTORY OF OTHER DISEASES OF THE MUSCULOSKELETAL SYSTEM AND CONNECTIVE TISSUE: ICD-10-CM

## 2021-11-16 DIAGNOSIS — Z79.891 LONG TERM (CURRENT) USE OF OPIATE ANALGESIC: ICD-10-CM

## 2021-11-16 DIAGNOSIS — H26.9 UNSPECIFIED CATARACT: ICD-10-CM

## 2021-11-17 DIAGNOSIS — R79.89 OTHER SPECIFIED ABNORMAL FINDINGS OF BLOOD CHEMISTRY: ICD-10-CM

## 2021-11-17 DIAGNOSIS — I16.1 HYPERTENSIVE EMERGENCY: ICD-10-CM

## 2021-11-17 DIAGNOSIS — L03.115 CELLULITIS OF RIGHT LOWER LIMB: ICD-10-CM

## 2021-11-17 DIAGNOSIS — I12.9 HYPERTENSIVE CHRONIC KIDNEY DISEASE WITH STAGE 1 THROUGH STAGE 4 CHRONIC KIDNEY DISEASE, OR UNSPECIFIED CHRONIC KIDNEY DISEASE: ICD-10-CM

## 2021-11-17 DIAGNOSIS — M1A.3411: ICD-10-CM

## 2021-11-17 DIAGNOSIS — F41.9 ANXIETY DISORDER, UNSPECIFIED: ICD-10-CM

## 2021-11-17 DIAGNOSIS — E87.2 ACIDOSIS: ICD-10-CM

## 2021-11-17 DIAGNOSIS — E83.52 HYPERCALCEMIA: ICD-10-CM

## 2021-11-17 DIAGNOSIS — N17.9 ACUTE KIDNEY FAILURE, UNSPECIFIED: ICD-10-CM

## 2021-11-17 DIAGNOSIS — N18.9 CHRONIC KIDNEY DISEASE, UNSPECIFIED: ICD-10-CM

## 2021-11-17 DIAGNOSIS — Z72.89 OTHER PROBLEMS RELATED TO LIFESTYLE: ICD-10-CM

## 2021-11-17 DIAGNOSIS — M1A.3221: ICD-10-CM

## 2021-11-17 DIAGNOSIS — D63.1 ANEMIA IN CHRONIC KIDNEY DISEASE: ICD-10-CM

## 2021-11-17 DIAGNOSIS — M1A.3421: ICD-10-CM

## 2021-11-17 DIAGNOSIS — L03.113 CELLULITIS OF RIGHT UPPER LIMB: ICD-10-CM

## 2021-11-17 DIAGNOSIS — E87.5 HYPERKALEMIA: ICD-10-CM

## 2021-11-17 DIAGNOSIS — M1A.3211: ICD-10-CM

## 2021-11-17 DIAGNOSIS — B96.89 OTHER SPECIFIED BACTERIAL AGENTS AS THE CAUSE OF DISEASES CLASSIFIED ELSEWHERE: ICD-10-CM

## 2021-11-17 DIAGNOSIS — M86.9 OSTEOMYELITIS, UNSPECIFIED: ICD-10-CM

## 2021-11-17 DIAGNOSIS — N12 TUBULO-INTERSTITIAL NEPHRITIS, NOT SPECIFIED AS ACUTE OR CHRONIC: ICD-10-CM

## 2021-11-17 DIAGNOSIS — M1A.3711: ICD-10-CM

## 2021-11-17 DIAGNOSIS — M1A.3721: ICD-10-CM

## 2022-02-09 PROBLEM — Z00.00 ENCOUNTER FOR PREVENTIVE HEALTH EXAMINATION: Status: ACTIVE | Noted: 2022-02-09

## 2022-02-28 ENCOUNTER — NON-APPOINTMENT (OUTPATIENT)
Age: 67
End: 2022-02-28

## 2022-07-27 NOTE — CONSULT NOTE ADULT - CONSULT REASON
Lab Results   Component Value Date    EGFR 17 07/27/2022    EGFR 14 07/25/2022    EGFR 16 03/29/2022    CREATININE 2 30 (H) 07/27/2022    CREATININE 2 73 (H) 07/25/2022    CREATININE 2 48 (H) 03/29/2022   · Baseline creatinine appears to be between 2 3-2 7  · Renal function remains stable at baseline, monitor BMP
gout
R foot gout vs infection
osteomyelitis
GWEN

## 2022-12-02 ENCOUNTER — INPATIENT (INPATIENT)
Facility: HOSPITAL | Age: 67
LOS: 6 days | Discharge: HOME | End: 2022-12-09
Attending: INTERNAL MEDICINE | Admitting: INTERNAL MEDICINE
Payer: MEDICARE

## 2022-12-02 ENCOUNTER — TRANSCRIPTION ENCOUNTER (OUTPATIENT)
Age: 67
End: 2022-12-02

## 2022-12-02 VITALS
TEMPERATURE: 97 F | WEIGHT: 160.94 LBS | HEART RATE: 81 BPM | SYSTOLIC BLOOD PRESSURE: 168 MMHG | OXYGEN SATURATION: 99 % | HEIGHT: 70 IN | RESPIRATION RATE: 18 BRPM | DIASTOLIC BLOOD PRESSURE: 85 MMHG

## 2022-12-02 DIAGNOSIS — M86.171 OTHER ACUTE OSTEOMYELITIS, RIGHT ANKLE AND FOOT: ICD-10-CM

## 2022-12-02 DIAGNOSIS — N18.6 END STAGE RENAL DISEASE: ICD-10-CM

## 2022-12-02 DIAGNOSIS — Z79.52 LONG TERM (CURRENT) USE OF SYSTEMIC STEROIDS: ICD-10-CM

## 2022-12-02 DIAGNOSIS — M06.9 RHEUMATOID ARTHRITIS, UNSPECIFIED: ICD-10-CM

## 2022-12-02 DIAGNOSIS — H54.8 LEGAL BLINDNESS, AS DEFINED IN USA: ICD-10-CM

## 2022-12-02 DIAGNOSIS — I12.0 HYPERTENSIVE CHRONIC KIDNEY DISEASE WITH STAGE 5 CHRONIC KIDNEY DISEASE OR END STAGE RENAL DISEASE: ICD-10-CM

## 2022-12-02 DIAGNOSIS — E44.0 MODERATE PROTEIN-CALORIE MALNUTRITION: ICD-10-CM

## 2022-12-02 DIAGNOSIS — M86.671 OTHER CHRONIC OSTEOMYELITIS, RIGHT ANKLE AND FOOT: ICD-10-CM

## 2022-12-02 LAB
ALBUMIN SERPL ELPH-MCNC: 3.2 G/DL — LOW (ref 3.3–5)
ALP SERPL-CCNC: 73 U/L — SIGNIFICANT CHANGE UP (ref 40–120)
ALT FLD-CCNC: 23 U/L — SIGNIFICANT CHANGE UP (ref 12–78)
ANION GAP SERPL CALC-SCNC: 11 MMOL/L — SIGNIFICANT CHANGE UP (ref 5–17)
AST SERPL-CCNC: 34 U/L — SIGNIFICANT CHANGE UP (ref 15–37)
BASOPHILS # BLD AUTO: 0.03 K/UL — SIGNIFICANT CHANGE UP (ref 0–0.2)
BASOPHILS NFR BLD AUTO: 0.6 % — SIGNIFICANT CHANGE UP (ref 0–2)
BILIRUB SERPL-MCNC: 0.6 MG/DL — SIGNIFICANT CHANGE UP (ref 0.2–1.2)
BUN SERPL-MCNC: 30 MG/DL — HIGH (ref 7–23)
CALCIUM SERPL-MCNC: 9.8 MG/DL — SIGNIFICANT CHANGE UP (ref 8.5–10.1)
CHLORIDE SERPL-SCNC: 97 MMOL/L — SIGNIFICANT CHANGE UP (ref 96–108)
CO2 SERPL-SCNC: 26 MMOL/L — SIGNIFICANT CHANGE UP (ref 22–31)
CREAT SERPL-MCNC: 3.4 MG/DL — HIGH (ref 0.5–1.3)
EGFR: 19 ML/MIN/1.73M2 — LOW
EOSINOPHIL # BLD AUTO: 0.18 K/UL — SIGNIFICANT CHANGE UP (ref 0–0.5)
EOSINOPHIL NFR BLD AUTO: 3.4 % — SIGNIFICANT CHANGE UP (ref 0–6)
ERYTHROCYTE [SEDIMENTATION RATE] IN BLOOD: 42 MM/HR — HIGH (ref 0–20)
GLUCOSE SERPL-MCNC: 86 MG/DL — SIGNIFICANT CHANGE UP (ref 70–99)
HCT VFR BLD CALC: 39.9 % — SIGNIFICANT CHANGE UP (ref 39–50)
HGB BLD-MCNC: 13.1 G/DL — SIGNIFICANT CHANGE UP (ref 13–17)
IMM GRANULOCYTES NFR BLD AUTO: 0.4 % — SIGNIFICANT CHANGE UP (ref 0–0.9)
LACTATE SERPL-SCNC: 3.1 MMOL/L — HIGH (ref 0.7–2)
LYMPHOCYTES # BLD AUTO: 1 K/UL — SIGNIFICANT CHANGE UP (ref 1–3.3)
LYMPHOCYTES # BLD AUTO: 19.2 % — SIGNIFICANT CHANGE UP (ref 13–44)
MCHC RBC-ENTMCNC: 32.8 G/DL — SIGNIFICANT CHANGE UP (ref 32–36)
MCHC RBC-ENTMCNC: 33.2 PG — SIGNIFICANT CHANGE UP (ref 27–34)
MCV RBC AUTO: 101.3 FL — HIGH (ref 80–100)
MONOCYTES # BLD AUTO: 0.6 K/UL — SIGNIFICANT CHANGE UP (ref 0–0.9)
MONOCYTES NFR BLD AUTO: 11.5 % — SIGNIFICANT CHANGE UP (ref 2–14)
NEUTROPHILS # BLD AUTO: 3.39 K/UL — SIGNIFICANT CHANGE UP (ref 1.8–7.4)
NEUTROPHILS NFR BLD AUTO: 64.9 % — SIGNIFICANT CHANGE UP (ref 43–77)
NRBC # BLD: 0 /100 WBCS — SIGNIFICANT CHANGE UP (ref 0–0)
PLATELET # BLD AUTO: 234 K/UL — SIGNIFICANT CHANGE UP (ref 150–400)
POTASSIUM SERPL-MCNC: 4.7 MMOL/L — SIGNIFICANT CHANGE UP (ref 3.5–5.3)
POTASSIUM SERPL-SCNC: 4.7 MMOL/L — SIGNIFICANT CHANGE UP (ref 3.5–5.3)
PROT SERPL-MCNC: 7.7 GM/DL — SIGNIFICANT CHANGE UP (ref 6–8.3)
RBC # BLD: 3.94 M/UL — LOW (ref 4.2–5.8)
RBC # FLD: 14.6 % — HIGH (ref 10.3–14.5)
SODIUM SERPL-SCNC: 134 MMOL/L — LOW (ref 135–145)
WBC # BLD: 5.22 K/UL — SIGNIFICANT CHANGE UP (ref 3.8–10.5)
WBC # FLD AUTO: 5.22 K/UL — SIGNIFICANT CHANGE UP (ref 3.8–10.5)

## 2022-12-02 PROCEDURE — 99223 1ST HOSP IP/OBS HIGH 75: CPT

## 2022-12-02 PROCEDURE — 99285 EMERGENCY DEPT VISIT HI MDM: CPT

## 2022-12-02 PROCEDURE — 73660 X-RAY EXAM OF TOE(S): CPT | Mod: 26,RT

## 2022-12-02 RX ORDER — SODIUM CHLORIDE 9 MG/ML
2000 INJECTION INTRAMUSCULAR; INTRAVENOUS; SUBCUTANEOUS ONCE
Refills: 0 | Status: COMPLETED | OUTPATIENT
Start: 2022-12-02 | End: 2022-12-02

## 2022-12-02 RX ORDER — PIPERACILLIN AND TAZOBACTAM 4; .5 G/20ML; G/20ML
3.38 INJECTION, POWDER, LYOPHILIZED, FOR SOLUTION INTRAVENOUS ONCE
Refills: 0 | Status: COMPLETED | OUTPATIENT
Start: 2022-12-02 | End: 2022-12-02

## 2022-12-02 RX ORDER — VANCOMYCIN HCL 1 G
1000 VIAL (EA) INTRAVENOUS ONCE
Refills: 0 | Status: COMPLETED | OUTPATIENT
Start: 2022-12-02 | End: 2022-12-02

## 2022-12-02 RX ADMIN — PIPERACILLIN AND TAZOBACTAM 200 GRAM(S): 4; .5 INJECTION, POWDER, LYOPHILIZED, FOR SOLUTION INTRAVENOUS at 20:11

## 2022-12-02 RX ADMIN — SODIUM CHLORIDE 2000 MILLILITER(S): 9 INJECTION INTRAMUSCULAR; INTRAVENOUS; SUBCUTANEOUS at 21:10

## 2022-12-02 RX ADMIN — Medication 1000 MILLIGRAM(S): at 22:00

## 2022-12-02 RX ADMIN — Medication 250 MILLIGRAM(S): at 20:54

## 2022-12-02 RX ADMIN — PIPERACILLIN AND TAZOBACTAM 3.38 GRAM(S): 4; .5 INJECTION, POWDER, LYOPHILIZED, FOR SOLUTION INTRAVENOUS at 20:55

## 2022-12-02 NOTE — ED PROVIDER NOTE - OBJECTIVE STATEMENT
This patient is a 67 year old man hx of ESRD on dialysis Mon/Wed/Fri who presents to the ER c/o of a worsening right foot wound.  He states that the wound started of as a cyst at the right 4th toe but has opened up with drainage from the wound and redness of the skin.  He denies fever.  He has been taking tylenol for pain and applying neosporin.  Patient was last seen by Dr. Piedra in November 2021.  He was given a prescription today for antibiotics by urgent care.

## 2022-12-02 NOTE — ED ADULT NURSE NOTE - ED STAT RN HANDOFF DETAILS
report given to RN; pt awaiting xray result; awaiting repeat lactate. pt to be transported from xray to [facility].

## 2022-12-02 NOTE — ED ADULT NURSE REASSESSMENT NOTE - NS ED NURSE REASSESS COMMENT FT1
pt is stable. waiting for bed. v/s stable. no new symptoms.
pt is stable. waiting for bed. v/s stable. no new symptoms.

## 2022-12-02 NOTE — ED PROVIDER NOTE - CLINICAL SUMMARY MEDICAL DECISION MAKING FREE TEXT BOX
Right foot wound with odor and purulence lost to follow-up for over a year.  Wound in need of debridement.  Will check Xray r/o gas in soft tissue, check labs, consult podiatry and Admit.

## 2022-12-02 NOTE — ED ADULT NURSE NOTE - OBJECTIVE STATEMENT
Patient received with complaints of pain and open blister to R foot for the past 2 weeks, pt states he was at home caring for the wound but worsen overtime. Foul odor and purulent drainage to site. pt denies any fever and chills.

## 2022-12-02 NOTE — ED ADULT NURSE NOTE - NSIMPLEMENTINTERV_GEN_ALL_ED
Implemented All Universal Safety Interventions:  [place] to call system. Call bell, personal items and telephone within reach. Instruct patient to call for assistance. Room bathroom lighting operational. Non-slip footwear when patient is off stretcher. Physically safe environment: no spills, clutter or unnecessary equipment. Stretcher in lowest position, wheels locked, appropriate side rails in place.

## 2022-12-02 NOTE — ED ADULT TRIAGE NOTE - CHIEF COMPLAINT QUOTE
pt c/o wound on rigth foot for 2 weeks. pt states the leg is painful, red, and foul discharge from the wound. pt is on dialysis, had dialysis today  denies dm

## 2022-12-03 DIAGNOSIS — M06.9 RHEUMATOID ARTHRITIS, UNSPECIFIED: ICD-10-CM

## 2022-12-03 DIAGNOSIS — N18.6 END STAGE RENAL DISEASE: ICD-10-CM

## 2022-12-03 DIAGNOSIS — A41.9 SEPSIS, UNSPECIFIED ORGANISM: ICD-10-CM

## 2022-12-03 DIAGNOSIS — I10 ESSENTIAL (PRIMARY) HYPERTENSION: ICD-10-CM

## 2022-12-03 DIAGNOSIS — M86.9 OSTEOMYELITIS, UNSPECIFIED: ICD-10-CM

## 2022-12-03 LAB
ANION GAP SERPL CALC-SCNC: 7 MMOL/L — SIGNIFICANT CHANGE UP (ref 5–17)
BASOPHILS # BLD AUTO: 0.03 K/UL — SIGNIFICANT CHANGE UP (ref 0–0.2)
BASOPHILS NFR BLD AUTO: 0.6 % — SIGNIFICANT CHANGE UP (ref 0–2)
BUN SERPL-MCNC: 31 MG/DL — HIGH (ref 7–23)
CALCIUM SERPL-MCNC: 9.1 MG/DL — SIGNIFICANT CHANGE UP (ref 8.5–10.1)
CHLORIDE SERPL-SCNC: 101 MMOL/L — SIGNIFICANT CHANGE UP (ref 96–108)
CO2 SERPL-SCNC: 27 MMOL/L — SIGNIFICANT CHANGE UP (ref 22–31)
CREAT SERPL-MCNC: 3.58 MG/DL — HIGH (ref 0.5–1.3)
CRP SERPL-MCNC: 66 MG/L — HIGH
EGFR: 18 ML/MIN/1.73M2 — LOW
EOSINOPHIL # BLD AUTO: 0.22 K/UL — SIGNIFICANT CHANGE UP (ref 0–0.5)
EOSINOPHIL NFR BLD AUTO: 4.3 % — SIGNIFICANT CHANGE UP (ref 0–6)
FLUAV AG NPH QL: SIGNIFICANT CHANGE UP
FLUBV AG NPH QL: SIGNIFICANT CHANGE UP
GLUCOSE SERPL-MCNC: 81 MG/DL — SIGNIFICANT CHANGE UP (ref 70–99)
HCT VFR BLD CALC: 35.8 % — LOW (ref 39–50)
HGB BLD-MCNC: 11.7 G/DL — LOW (ref 13–17)
IMM GRANULOCYTES NFR BLD AUTO: 0.4 % — SIGNIFICANT CHANGE UP (ref 0–0.9)
LACTATE SERPL-SCNC: 1.7 MMOL/L — SIGNIFICANT CHANGE UP (ref 0.7–2)
LYMPHOCYTES # BLD AUTO: 1.26 K/UL — SIGNIFICANT CHANGE UP (ref 1–3.3)
LYMPHOCYTES # BLD AUTO: 24.4 % — SIGNIFICANT CHANGE UP (ref 13–44)
MCHC RBC-ENTMCNC: 32.7 G/DL — SIGNIFICANT CHANGE UP (ref 32–36)
MCHC RBC-ENTMCNC: 32.7 PG — SIGNIFICANT CHANGE UP (ref 27–34)
MCV RBC AUTO: 100 FL — SIGNIFICANT CHANGE UP (ref 80–100)
MONOCYTES # BLD AUTO: 0.69 K/UL — SIGNIFICANT CHANGE UP (ref 0–0.9)
MONOCYTES NFR BLD AUTO: 13.3 % — SIGNIFICANT CHANGE UP (ref 2–14)
NEUTROPHILS # BLD AUTO: 2.95 K/UL — SIGNIFICANT CHANGE UP (ref 1.8–7.4)
NEUTROPHILS NFR BLD AUTO: 57 % — SIGNIFICANT CHANGE UP (ref 43–77)
NRBC # BLD: 0 /100 WBCS — SIGNIFICANT CHANGE UP (ref 0–0)
PLATELET # BLD AUTO: 223 K/UL — SIGNIFICANT CHANGE UP (ref 150–400)
POTASSIUM SERPL-MCNC: 4 MMOL/L — SIGNIFICANT CHANGE UP (ref 3.5–5.3)
POTASSIUM SERPL-SCNC: 4 MMOL/L — SIGNIFICANT CHANGE UP (ref 3.5–5.3)
RBC # BLD: 3.58 M/UL — LOW (ref 4.2–5.8)
RBC # FLD: 14.5 % — SIGNIFICANT CHANGE UP (ref 10.3–14.5)
SARS-COV-2 RNA SPEC QL NAA+PROBE: SIGNIFICANT CHANGE UP
SODIUM SERPL-SCNC: 135 MMOL/L — SIGNIFICANT CHANGE UP (ref 135–145)
WBC # BLD: 5.17 K/UL — SIGNIFICANT CHANGE UP (ref 3.8–10.5)
WBC # FLD AUTO: 5.17 K/UL — SIGNIFICANT CHANGE UP (ref 3.8–10.5)

## 2022-12-03 PROCEDURE — 99232 SBSQ HOSP IP/OBS MODERATE 35: CPT

## 2022-12-03 RX ORDER — ACETAMINOPHEN 500 MG
650 TABLET ORAL EVERY 6 HOURS
Refills: 0 | Status: DISCONTINUED | OUTPATIENT
Start: 2022-12-03 | End: 2022-12-09

## 2022-12-03 RX ORDER — AMLODIPINE BESYLATE 2.5 MG/1
10 TABLET ORAL DAILY
Refills: 0 | Status: DISCONTINUED | OUTPATIENT
Start: 2022-12-03 | End: 2022-12-09

## 2022-12-03 RX ORDER — SENNA PLUS 8.6 MG/1
2 TABLET ORAL AT BEDTIME
Refills: 0 | Status: DISCONTINUED | OUTPATIENT
Start: 2022-12-03 | End: 2022-12-09

## 2022-12-03 RX ORDER — SODIUM CHLORIDE 9 MG/ML
1000 INJECTION INTRAMUSCULAR; INTRAVENOUS; SUBCUTANEOUS
Refills: 0 | Status: DISCONTINUED | OUTPATIENT
Start: 2022-12-03 | End: 2022-12-03

## 2022-12-03 RX ORDER — CADEXOMER IODINE 0.9 %
1 PADS, MEDICATED (EA) TOPICAL DAILY
Refills: 0 | Status: DISCONTINUED | OUTPATIENT
Start: 2022-12-03 | End: 2022-12-09

## 2022-12-03 RX ORDER — LANOLIN ALCOHOL/MO/W.PET/CERES
3 CREAM (GRAM) TOPICAL AT BEDTIME
Refills: 0 | Status: DISCONTINUED | OUTPATIENT
Start: 2022-12-03 | End: 2022-12-09

## 2022-12-03 RX ORDER — BUMETANIDE 0.25 MG/ML
1 INJECTION INTRAMUSCULAR; INTRAVENOUS DAILY
Refills: 0 | Status: DISCONTINUED | OUTPATIENT
Start: 2022-12-03 | End: 2022-12-09

## 2022-12-03 RX ORDER — PIPERACILLIN AND TAZOBACTAM 4; .5 G/20ML; G/20ML
3.38 INJECTION, POWDER, LYOPHILIZED, FOR SOLUTION INTRAVENOUS EVERY 12 HOURS
Refills: 0 | Status: DISCONTINUED | OUTPATIENT
Start: 2022-12-03 | End: 2022-12-09

## 2022-12-03 RX ORDER — VANCOMYCIN HCL 1 G
1500 VIAL (EA) INTRAVENOUS EVERY 24 HOURS
Refills: 0 | Status: DISCONTINUED | OUTPATIENT
Start: 2022-12-03 | End: 2022-12-03

## 2022-12-03 RX ORDER — HYDRALAZINE HCL 50 MG
25 TABLET ORAL EVERY 8 HOURS
Refills: 0 | Status: DISCONTINUED | OUTPATIENT
Start: 2022-12-03 | End: 2022-12-09

## 2022-12-03 RX ORDER — PANTOPRAZOLE SODIUM 20 MG/1
40 TABLET, DELAYED RELEASE ORAL
Refills: 0 | Status: DISCONTINUED | OUTPATIENT
Start: 2022-12-03 | End: 2022-12-09

## 2022-12-03 RX ORDER — TRAMADOL HYDROCHLORIDE 50 MG/1
50 TABLET ORAL EVERY 6 HOURS
Refills: 0 | Status: DISCONTINUED | OUTPATIENT
Start: 2022-12-03 | End: 2022-12-09

## 2022-12-03 RX ORDER — HEPARIN SODIUM 5000 [USP'U]/ML
5000 INJECTION INTRAVENOUS; SUBCUTANEOUS EVERY 8 HOURS
Refills: 0 | Status: DISCONTINUED | OUTPATIENT
Start: 2022-12-03 | End: 2022-12-09

## 2022-12-03 RX ORDER — MORPHINE SULFATE 50 MG/1
2 CAPSULE, EXTENDED RELEASE ORAL ONCE
Refills: 0 | Status: DISCONTINUED | OUTPATIENT
Start: 2022-12-03 | End: 2022-12-06

## 2022-12-03 RX ORDER — ONDANSETRON 8 MG/1
4 TABLET, FILM COATED ORAL EVERY 8 HOURS
Refills: 0 | Status: DISCONTINUED | OUTPATIENT
Start: 2022-12-03 | End: 2022-12-09

## 2022-12-03 RX ADMIN — SODIUM CHLORIDE 100 MILLILITER(S): 9 INJECTION INTRAMUSCULAR; INTRAVENOUS; SUBCUTANEOUS at 02:38

## 2022-12-03 RX ADMIN — TRAMADOL HYDROCHLORIDE 50 MILLIGRAM(S): 50 TABLET ORAL at 19:49

## 2022-12-03 RX ADMIN — AMLODIPINE BESYLATE 10 MILLIGRAM(S): 2.5 TABLET ORAL at 05:25

## 2022-12-03 RX ADMIN — Medication 40 MILLIGRAM(S): at 05:26

## 2022-12-03 RX ADMIN — Medication 25 MILLIGRAM(S): at 13:15

## 2022-12-03 RX ADMIN — HEPARIN SODIUM 5000 UNIT(S): 5000 INJECTION INTRAVENOUS; SUBCUTANEOUS at 13:15

## 2022-12-03 RX ADMIN — PIPERACILLIN AND TAZOBACTAM 25 GRAM(S): 4; .5 INJECTION, POWDER, LYOPHILIZED, FOR SOLUTION INTRAVENOUS at 05:30

## 2022-12-03 RX ADMIN — Medication 25 MILLIGRAM(S): at 05:26

## 2022-12-03 RX ADMIN — BUMETANIDE 1 MILLIGRAM(S): 0.25 INJECTION INTRAMUSCULAR; INTRAVENOUS at 05:26

## 2022-12-03 RX ADMIN — SENNA PLUS 2 TABLET(S): 8.6 TABLET ORAL at 22:03

## 2022-12-03 RX ADMIN — PIPERACILLIN AND TAZOBACTAM 25 GRAM(S): 4; .5 INJECTION, POWDER, LYOPHILIZED, FOR SOLUTION INTRAVENOUS at 18:54

## 2022-12-03 RX ADMIN — TRAMADOL HYDROCHLORIDE 50 MILLIGRAM(S): 50 TABLET ORAL at 21:46

## 2022-12-03 RX ADMIN — PANTOPRAZOLE SODIUM 40 MILLIGRAM(S): 20 TABLET, DELAYED RELEASE ORAL at 05:26

## 2022-12-03 RX ADMIN — HEPARIN SODIUM 5000 UNIT(S): 5000 INJECTION INTRAVENOUS; SUBCUTANEOUS at 05:28

## 2022-12-03 RX ADMIN — Medication 25 MILLIGRAM(S): at 22:03

## 2022-12-03 RX ADMIN — TRAMADOL HYDROCHLORIDE 50 MILLIGRAM(S): 50 TABLET ORAL at 14:15

## 2022-12-03 RX ADMIN — TRAMADOL HYDROCHLORIDE 50 MILLIGRAM(S): 50 TABLET ORAL at 13:15

## 2022-12-03 RX ADMIN — HEPARIN SODIUM 5000 UNIT(S): 5000 INJECTION INTRAVENOUS; SUBCUTANEOUS at 22:05

## 2022-12-03 NOTE — PROGRESS NOTE ADULT - SUBJECTIVE AND OBJECTIVE BOX
Patient is a 67y old  Male who presents with a chief complaint of      INTERVAL HPI/OVERNIGHT EVENTS:  Patient seen and evaluated at bedside.  Pt is resting comfortable in NAD. Denies N/V/F/C.  Pain rated at X/10    Allergies    No Known Allergies    Intolerances        Vital Signs Last 24 Hrs  T(C): 36.8 (03 Dec 2022 08:21), Max: 37.2 (02 Dec 2022 16:43)  T(F): 98.3 (03 Dec 2022 08:21), Max: 98.9 (02 Dec 2022 16:43)  HR: 74 (03 Dec 2022 08:21) (74 - 83)  BP: 135/73 (03 Dec 2022 08:21) (123/76 - 168/85)  BP(mean): --  RR: 18 (03 Dec 2022 08:21) (16 - 18)  SpO2: 100% (03 Dec 2022 08:21) (98% - 100%)    Parameters below as of 03 Dec 2022 08:21  Patient On (Oxygen Delivery Method): room air        LABS:                        11.7   5.17  )-----------( 223      ( 03 Dec 2022 02:50 )             35.8     12-03    135  |  101  |  31<H>  ----------------------------<  81  4.0   |  27  |  3.58<H>    Ca    9.1      03 Dec 2022 02:50    TPro  7.7  /  Alb  3.2<L>  /  TBili  0.6  /  DBili  x   /  AST  34  /  ALT  23  /  AlkPhos  73  12-02        CAPILLARY BLOOD GLUCOSE          Lower Extremity Physical Exam:    Vascular: DP/PT 2/4, B/L, CFT <3 seconds B/L, Temperature gradient warm to cool B/L.   Neuro: Epicritic sensation intact to the level of forefoot B/L.  Musculoskeletal/Ortho: progressive Rheumatoid arthritis B/L with ulnar deviation of the lesser metatarsals. HAV B/L.   Skin: Right foot 4th digit dactylitis with medial wound probing to bone, scant gouty tophi visible,  serosanguinous drainage, no malodor, with erythema and signs of infection. No clinical signs of infection to the left foot.       RADIOLOGY & ADDITIONAL TESTS:

## 2022-12-03 NOTE — PROGRESS NOTE ADULT - SUBJECTIVE AND OBJECTIVE BOX
Patient is a 67y old  Male who presents with a chief complaint of     INTERVAL HPI/OVERNIGHT EVENTS:    MEDICATIONS  (STANDING):  amLODIPine   Tablet 10 milliGRAM(s) Oral daily  buMETAnide 1 milliGRAM(s) Oral daily  cadexomer iodine 0.9% Gel 1 Application(s) Topical daily  heparin   Injectable 5000 Unit(s) SubCutaneous every 8 hours  hydrALAZINE 25 milliGRAM(s) Oral every 8 hours  pantoprazole    Tablet 40 milliGRAM(s) Oral before breakfast  piperacillin/tazobactam IVPB.. 3.375 Gram(s) IV Intermittent every 12 hours  predniSONE   Tablet 40 milliGRAM(s) Oral daily  senna 2 Tablet(s) Oral at bedtime  sodium chloride 0.9%. 1000 milliLiter(s) (100 mL/Hr) IV Continuous <Continuous>    MEDICATIONS  (PRN):  acetaminophen     Tablet .. 650 milliGRAM(s) Oral every 6 hours PRN Temp greater or equal to 38C (100.4F), Mild Pain (1 - 3)  aluminum hydroxide/magnesium hydroxide/simethicone Suspension 30 milliLiter(s) Oral every 4 hours PRN Dyspepsia  bisacodyl 5 milliGRAM(s) Oral every 12 hours PRN Constipation  melatonin 3 milliGRAM(s) Oral at bedtime PRN Insomnia  ondansetron Injectable 4 milliGRAM(s) IV Push every 8 hours PRN Nausea and/or Vomiting  traMADol 50 milliGRAM(s) Oral every 6 hours PRN Severe Pain (7 - 10)      Allergies    No Known Allergies    Intolerances        REVIEW OF SYSTEMS:  CONSTITUTIONAL: No fever, weight loss, or fatigue  EYES: No eye pain, visual disturbances, or discharge  ENMT:  No difficulty hearing, tinnitus, vertigo; No sinus or throat pain  NECK: No pain or stiffness  BREASTS: No pain, masses, or nipple discharge  RESPIRATORY: No cough, wheezing, chills or hemoptysis; No shortness of breath  CARDIOVASCULAR: No chest pain, palpitations, dizziness, or leg swelling  GASTROINTESTINAL: No abdominal or epigastric pain. No nausea, vomiting, or hematemesis; No diarrhea or constipation. No melena or hematochezia.  GENITOURINARY: No dysuria, frequency, hematuria, or incontinence  NEUROLOGICAL: No headaches, memory loss, loss of strength, numbness, or tremors  SKIN: No itching, burning, rashes, or lesions   LYMPH NODES: No enlarged glands  ENDOCRINE: No heat or cold intolerance; No hair loss  MUSCULOSKELETAL: No joint pain or swelling; No muscle, back, or extremity pain  PSYCHIATRIC: No depression, anxiety, mood swings, or difficulty sleeping  HEME/LYMPH: No easy bruising, or bleeding gums  ALLERGY AND IMMUNOLOGIC: No hives or eczema    Vital Signs Last 24 Hrs  T(C): 36.7 (03 Dec 2022 11:30), Max: 37.2 (02 Dec 2022 16:43)  T(F): 98 (03 Dec 2022 11:30), Max: 98.9 (02 Dec 2022 16:43)  HR: 88 (03 Dec 2022 11:30) (74 - 88)  BP: 160/85 (03 Dec 2022 11:30) (123/76 - 168/85)  BP(mean): --  RR: 18 (03 Dec 2022 11:30) (16 - 18)  SpO2: 97% (03 Dec 2022 11:30) (97% - 100%)    Parameters below as of 03 Dec 2022 11:30  Patient On (Oxygen Delivery Method): room air        PHYSICAL EXAM:  GENERAL: NAD, well-groomed, well-developed  HEAD:  Atraumatic, Normocephalic  EYES: EOMI, PERRLA, conjunctiva and sclera clear  ENMT: No tonsillar erythema, exudates, or enlargement; Moist mucous membranes, Good dentition, No lesions  NECK: Supple, No JVD, Normal thyroid  NERVOUS SYSTEM:  Alert & Oriented X3, Good concentration; Motor Strength 5/5 B/L upper and lower extremities; DTRs 2+ intact and symmetric  CHEST/LUNG: Clear to percussion bilaterally; No rales, rhonchi, wheezing, or rubs  HEART: Regular rate and rhythm; No murmurs, rubs, or gallops  ABDOMEN: Soft, Nontender, Nondistended; Bowel sounds present  EXTREMITIES:  2+ Peripheral Pulses, No clubbing, cyanosis, or edema  LYMPH: No lymphadenopathy noted  SKIN: No rashes or lesions    LABS:                        11.7   5.17  )-----------( 223      ( 03 Dec 2022 02:50 )             35.8     12-03    135  |  101  |  31<H>  ----------------------------<  81  4.0   |  27  |  3.58<H>    Ca    9.1      03 Dec 2022 02:50    TPro  7.7  /  Alb  3.2<L>  /  TBili  0.6  /  DBili  x   /  AST  34  /  ALT  23  /  AlkPhos  73  12-02        CAPILLARY BLOOD GLUCOSE          RADIOLOGY & ADDITIONAL TESTS:    Imaging Personally Reviewed:  [ ] YES  [ ] NO    Consultant(s) Notes Reviewed:  [ ] YES  [ ] NO    Care Discussed with Consultants/Other Providers [ ] YES  [ ] NO Patient is a 67y old  Male who presents with a chief complaint of     INTERVAL HPI/OVERNIGHT EVENTS: pt doing well, not in acute distress, denies pain     MEDICATIONS  (STANDING):  amLODIPine   Tablet 10 milliGRAM(s) Oral daily  buMETAnide 1 milliGRAM(s) Oral daily  cadexomer iodine 0.9% Gel 1 Application(s) Topical daily  heparin   Injectable 5000 Unit(s) SubCutaneous every 8 hours  hydrALAZINE 25 milliGRAM(s) Oral every 8 hours  pantoprazole    Tablet 40 milliGRAM(s) Oral before breakfast  piperacillin/tazobactam IVPB.. 3.375 Gram(s) IV Intermittent every 12 hours  predniSONE   Tablet 40 milliGRAM(s) Oral daily  senna 2 Tablet(s) Oral at bedtime  sodium chloride 0.9%. 1000 milliLiter(s) (100 mL/Hr) IV Continuous <Continuous>    MEDICATIONS  (PRN):  acetaminophen     Tablet .. 650 milliGRAM(s) Oral every 6 hours PRN Temp greater or equal to 38C (100.4F), Mild Pain (1 - 3)  aluminum hydroxide/magnesium hydroxide/simethicone Suspension 30 milliLiter(s) Oral every 4 hours PRN Dyspepsia  bisacodyl 5 milliGRAM(s) Oral every 12 hours PRN Constipation  melatonin 3 milliGRAM(s) Oral at bedtime PRN Insomnia  ondansetron Injectable 4 milliGRAM(s) IV Push every 8 hours PRN Nausea and/or Vomiting  traMADol 50 milliGRAM(s) Oral every 6 hours PRN Severe Pain (7 - 10)      Allergies    No Known Allergies    Intolerances        REVIEW OF SYSTEMS:  CONSTITUTIONAL: No fever, weight loss, or fatigue  EYES: No eye pain, visual disturbances, or discharge  ENMT:  No difficulty hearing, tinnitus, vertigo; No sinus or throat pain  NECK: No pain or stiffness  BREASTS: No pain, masses, or nipple discharge  RESPIRATORY: No cough, wheezing, chills or hemoptysis; No shortness of breath  CARDIOVASCULAR: No chest pain, palpitations, dizziness, or leg swelling  GASTROINTESTINAL: No abdominal or epigastric pain. No nausea, vomiting, or hematemesis; No diarrhea or constipation. No melena or hematochezia.  GENITOURINARY: No dysuria, frequency, hematuria, or incontinence  NEUROLOGICAL: No headaches, memory loss, loss of strength, numbness, or tremors  SKIN: No itching, burning, rashes, or lesions   LYMPH NODES: No enlarged glands  ENDOCRINE: No heat or cold intolerance; No hair loss  MUSCULOSKELETAL: No joint pain or swelling; No muscle, back, or extremity pain  PSYCHIATRIC: No depression, anxiety, mood swings, or difficulty sleeping  HEME/LYMPH: No easy bruising, or bleeding gums  ALLERGY AND IMMUNOLOGIC: No hives or eczema    Vital Signs Last 24 Hrs  T(C): 36.7 (03 Dec 2022 11:30), Max: 37.2 (02 Dec 2022 16:43)  T(F): 98 (03 Dec 2022 11:30), Max: 98.9 (02 Dec 2022 16:43)  HR: 88 (03 Dec 2022 11:30) (74 - 88)  BP: 160/85 (03 Dec 2022 11:30) (123/76 - 168/85)  BP(mean): --  RR: 18 (03 Dec 2022 11:30) (16 - 18)  SpO2: 97% (03 Dec 2022 11:30) (97% - 100%)    Parameters below as of 03 Dec 2022 11:30  Patient On (Oxygen Delivery Method): room air        PHYSICAL EXAM:  GENERAL: NAD, well-groomed, well-developed  HEAD:  Atraumatic, Normocephalic  EYES: EOMI, PERRLA, conjunctiva and sclera clear  ENMT: No tonsillar erythema, exudates, or enlargement; Moist mucous membranes, Good dentition, No lesions  NECK: Supple, No JVD, Normal thyroid  NERVOUS SYSTEM:  Alert & Oriented X3, Good concentration; Motor Strength 5/5 B/L upper and lower extremities; DTRs 2+ intact and symmetric  CHEST/LUNG: Clear to percussion bilaterally; No rales, rhonchi, wheezing, or rubs  HEART: Regular rate and rhythm; No murmurs, rubs, or gallops  ABDOMEN: Soft, Nontender, Nondistended; Bowel sounds present  EXTREMITIES:  2+ Peripheral Pulses, No clubbing, cyanosis, or edema, right toe- 4th digit- with ulceration and erythema/pus   SKIN: No rashes or lesions    LABS:                        11.7   5.17  )-----------( 223      ( 03 Dec 2022 02:50 )             35.8     12-03    135  |  101  |  31<H>  ----------------------------<  81  4.0   |  27  |  3.58<H>    Ca    9.1      03 Dec 2022 02:50    TPro  7.7  /  Alb  3.2<L>  /  TBili  0.6  /  DBili  x   /  AST  34  /  ALT  23  /  AlkPhos  73  12-02        CAPILLARY BLOOD GLUCOSE          RADIOLOGY & ADDITIONAL TESTS:    Imaging Personally Reviewed:  [ ] YES  [ ] NO    Consultant(s) Notes Reviewed:  [ ] YES  [ ] NO    Care Discussed with Consultants/Other Providers [ ] YES  [ ] NO

## 2022-12-03 NOTE — H&P ADULT - HISTORY OF PRESENT ILLNESS
This is a 68 yo M with ESRD, RA and HTN, presenting due to nonhealing R 4th toe wound. He has joint issues in setting of RA and was previously treated for foot infection at wound care which healed 1 yr ago. this time infection has gotten worse over the past 3 weeks causing severe pain and prompting ED visit. Podiatry saw pt in ED and recommended vanco/zosyn for suspected osteomyelitis. vitals stable. labs significant for lactate 3.7 improving to 1.7 after IVF. Sed rate 42.     denies f/c, ams, cp, sob, syncope, abd pain, n/v/d/c.

## 2022-12-03 NOTE — PATIENT PROFILE ADULT - FALL HARM RISK - UNIVERSAL INTERVENTIONS
Bed in lowest position, wheels locked, appropriate side rails in place/Call bell, personal items and telephone in reach/Instruct patient to call for assistance before getting out of bed or chair/Non-slip footwear when patient is out of bed/[place] to call system/Physically safe environment - no spills, clutter or unnecessary equipment/Purposeful Proactive Rounding/Room/bathroom lighting operational, light cord in reach

## 2022-12-03 NOTE — PATIENT PROFILE ADULT - NSPROPOAPRESSUREINJURY_GEN_A_NUR
Principal Discharge DX:	Sepsis due to Escherichia coli (E. coli)  Goal:	Free from reoccurrence of infection  Secondary Diagnosis:	Uncontrolled type 2 diabetes mellitus with hyperglycemia, unspecified long term insulin use status  Instructions for follow-up, activity and diet:	Your next appointment with endocrinologist ([facility] endocrine ph: [phone])/PMD is -   HgA1C this admission - 10.6  Make sure you get your HgA1c checked every three months.  If you take oral diabetes medications, check your blood glucose two times a day.  If you take insulin, check your blood glucose before meals and at bedtime.  It's important not to skip any meals.  Keep a log of your blood glucose results and always take it with you to your doctor appointments.  Keep a list of your current medications including injectables and over the counter medications and bring this medication list with you to all your doctor appointments.  If you have not seen your ophthalmologist this year call for appointment.  Check your feet daily for redness, sores, or openings. Do not self treat. If no improvement in two days call your primary care physician for an appointment.  Low blood sugar (hypoglycemia) is a blood sugar below 70mg/dl. Check your blood sugar if you feel signs/symptoms of hypoglycemia. If your blood sugar is below 70 take 15 grams of carbohydrates (ex 4 oz of apple juice, 3-4 glucose tablets, or 4-6 oz of regular soda) wait 15 minutes and repeat blood sugar to make sure it comes up above 70.  If your blood sugar is above 70 and you are due for a meal, have a meal.  If you are not due for a meal have a snack.  This snack helps keeps your blood sugar at a safe range.  Secondary Diagnosis:	Renal transplant recipient  Instructions for follow-up, activity and diet:	Continue with Prograf and follow up with your Nephrologist  Secondary Diagnosis:	Essential hypertension  Instructions for follow-up, activity and diet:	Continue with home medications as prescribed no Principal Discharge DX:	Sepsis due to Escherichia coli (E. coli)  Goal:	Free from reoccurrence of infection  Instructions for follow-up, activity and diet:	complete course of oral antibiotics  Secondary Diagnosis:	Uncontrolled type 2 diabetes mellitus with hyperglycemia, unspecified long term insulin use status  Instructions for follow-up, activity and diet:	HgA1C this admission - 10.6  Make sure you get your HgA1c checked every three months.  If you take oral diabetes medications, check your blood glucose two times a day.  If you take insulin, check your blood glucose before meals and at bedtime.  It's important not to skip any meals.  Keep a log of your blood glucose results and always take it with you to your doctor appointments.  Keep a list of your current medications including injectables and over the counter medications and bring this medication list with you to all your doctor appointments.  If you have not seen your ophthalmologist this year call for appointment.  Check your feet daily for redness, sores, or openings. Do not self treat. If no improvement in two days call your primary care physician for an appointment.  Low blood sugar (hypoglycemia) is a blood sugar below 70mg/dl. Check your blood sugar if you feel signs/symptoms of hypoglycemia. If your blood sugar is below 70 take 15 grams of carbohydrates (ex 4 oz of apple juice, 3-4 glucose tablets, or 4-6 oz of regular soda) wait 15 minutes and repeat blood sugar to make sure it comes up above 70.  If your blood sugar is above 70 and you are due for a meal, have a meal.  If you are not due for a meal have a snack.  This snack helps keeps your blood sugar at a safe range.  Secondary Diagnosis:	Renal transplant recipient  Instructions for follow-up, activity and diet:	Continue with Prograf and follow up with your Nephrologist  Secondary Diagnosis:	Essential hypertension  Instructions for follow-up, activity and diet:	Continue with home medications as prescribed

## 2022-12-03 NOTE — CONSULT NOTE ADULT - SUBJECTIVE AND OBJECTIVE BOX
Patient chart reviewed, full consult to follow.     Will arrange for routine HD today.     Thank you for the courtesy of this consultation.         Patient chart reviewed, full consult to follow.     Known to be from previous admission; severe tophi, urate nephropathy;         Surgical Pathology Report (11.01.21 @ 13:31)        Limited sample, with focal active interstitial nephritis, several foci of  urate crystal deposition suggesting urate nephropathy, and a focal scar    with 8 sclerosed and some hypoperfused glomeruli (see comment)                  Will arrange for routine HD Monday.     Thank you for the courtesy of this consultation. [facility] NEPHROLOGY SERVICES, [facility]  NEPHROLOGY AND HYPERTENSION  [address]  [address]  [place] [address]  [phone]    MD ELLI WHITTAKER MD YELENA ROSENBERG, MD BINNY KOSHY, MD CHRISTOPHER CAPUTO, MD EDWARD BOVER, MD      Information from chart:  "Patient is a 67y old  Male who presents with a chief complaint of   HPI:  This is a 68 yo M with ESRD, RA and HTN, presenting due to nonhealing R 4th toe wound. He has joint issues in setting of RA and was previously treated for foot infection at wound care which healed 1 yr ago. this time infection has gotten worse over the past 3 weeks causing severe pain and prompting ED visit. Podiatry saw pt in ED and recommended vanco/zosyn for suspected osteomyelitis. vitals stable. labs significant for lactate 3.7 improving to 1.7 after IVF. Sed rate 42.     denies f/c, ams, cp, sob, syncope, abd pain, n/v/d/c.  (03 Dec 2022 06:07)   "    PAST MEDICAL & SURGICAL HISTORY:  ESRD on dialysis        FAMILY HISTORY:  No pertinent family history in first degree relatives      Allergies    No Known Allergies    Intolerances      Home Medications:  bisacodyl 5 mg oral delayed release tablet: 1 tab(s) orally every 12 hours, As needed, Constipation (03 Nov 2021 11:50)  febuxostat 40 mg oral tablet: 1 tab(s) orally once a day (03 Nov 2021 11:50)  predniSONE 20 mg oral tablet: 2 tab(s) orally once a day (03 Nov 2021 13:03)  senna oral tablet: 2 tab(s) orally once a day (at bedtime) (03 Nov 2021 11:50)    MEDICATIONS  (STANDING):  amLODIPine   Tablet 10 milliGRAM(s) Oral daily  buMETAnide 1 milliGRAM(s) Oral daily  cadexomer iodine 0.9% Gel 1 Application(s) Topical daily  heparin   Injectable 5000 Unit(s) SubCutaneous every 8 hours  hydrALAZINE 25 milliGRAM(s) Oral every 8 hours  morphine  - Injectable 2 milliGRAM(s) IV Push once  pantoprazole    Tablet 40 milliGRAM(s) Oral before breakfast  piperacillin/tazobactam IVPB.. 3.375 Gram(s) IV Intermittent every 12 hours  predniSONE   Tablet 40 milliGRAM(s) Oral daily  senna 2 Tablet(s) Oral at bedtime    MEDICATIONS  (PRN):  acetaminophen     Tablet .. 650 milliGRAM(s) Oral every 6 hours PRN Temp greater or equal to 38C (100.4F), Mild Pain (1 - 3)  aluminum hydroxide/magnesium hydroxide/simethicone Suspension 30 milliLiter(s) Oral every 4 hours PRN Dyspepsia  bisacodyl 5 milliGRAM(s) Oral every 12 hours PRN Constipation  melatonin 3 milliGRAM(s) Oral at bedtime PRN Insomnia  ondansetron Injectable 4 milliGRAM(s) IV Push every 8 hours PRN Nausea and/or Vomiting  traMADol 50 milliGRAM(s) Oral every 6 hours PRN Severe Pain (7 - 10)    Vital Signs Last 24 Hrs  T(C): 37.2 (03 Dec 2022 16:35), Max: 37.2 (03 Dec 2022 16:35)  T(F): 99 (03 Dec 2022 16:35), Max: 99 (03 Dec 2022 16:35)  HR: 87 (03 Dec 2022 16:35) (74 - 88)  BP: 153/81 (03 Dec 2022 16:35) (123/76 - 160/85)  BP(mean): --  RR: 18 (03 Dec 2022 16:35) (16 - 18)  SpO2: 98% (03 Dec 2022 16:35) (97% - 100%)    Parameters below as of 03 Dec 2022 14:00  Patient On (Oxygen Delivery Method): room air        Daily     Daily     CAPILLARY BLOOD GLUCOSE        PHYSICAL EXAM:      T(C): 37.2 (12-03-22 @ 16:35), Max: 37.2 (12-03-22 @ 16:35)  HR: 87 (12-03-22 @ 16:35) (74 - 88)  BP: 153/81 (12-03-22 @ 16:35) (123/76 - 160/85)  RR: 18 (12-03-22 @ 16:35) (16 - 18)  SpO2: 98% (12-03-22 @ 16:35) (97% - 100%)  Wt(kg): --  Lungs clear  Heart S1S2  Abd soft NT ND  Extremities:   tr edema              12-03    135  |  101  |  31<H>  ----------------------------<  81  4.0   |  27  |  3.58<H>    Ca    9.1      03 Dec 2022 02:50    TPro  7.7  /  Alb  3.2<L>  /  TBili  0.6  /  DBili  x   /  AST  34  /  ALT  23  /  AlkPhos  73  12-02                          11.7   5.17  )-----------( 223      ( 03 Dec 2022 02:50 )             35.8     Creatinine Trend: 3.58<--, 3.40<--          Assessment   ESRD; maintenance HD    Plan  Arrange for HD Monday    Marcel Carmona MD

## 2022-12-03 NOTE — H&P ADULT - ASSESSMENT
This is a 68 yo M with ESRD, RA and HTN, presenting due to nonhealing R 4th toe wound. He has joint issues in setting of RA and was previously treated for foot infection at wound care which healed 1 yr ago. this time infection has gotten worse over the past 3 weeks causing severe pain and prompting ED visit. Podiatry saw pt in ED and recommended vanco/zosyn for suspected osteomyelitis. vitals stable. labs significant for lactate 3.7 improving to 1.7 after IVF. Sed rate 42.     suspected right 4th toe osteomyelitis   sepsis   RA    HTN   ESRD   -podiatry consult for official recs and debridement  -resume home meds including prednisone  -vanco (to be dosed intermittently based on level in setting of HD) and zosyn   -Renal consult for HD   -NS @ 100   -renal diet   -hep ppx

## 2022-12-03 NOTE — PROGRESS NOTE ADULT - ASSESSMENT
a 68 yo M with ESRD, RA and HTN, presenting due to nonhealing R 4th toe wound. He has joint issues in setting of RA and was previously treated for foot infection at wound care which healed 1 yr ago. this time infection has gotten worse over the past 3 weeks causing severe pain and prompting ED visit. Podiatry saw pt in ED and recommended vanco/zosyn for suspected osteomyelitis. vitals stable. labs significant for lactate 3.7 improving to 1.7 after IVF. Sed rate 42.     suspected right 4th toe osteomyelitis   sepsis   RA    HTN   ESRD   -podiatry consult for official recs and debridement  -resume home meds including prednisone  -vanco (to be dosed intermittently based on level in setting of HD) and zosyn   -Renal consult for HD   -NS @ 100   -renal diet   -hep ppx     Problem/Plan - 1:  ·  Problem: Toe osteomyelitis, right.      Problem/Plan - 2:  ·  Problem: Sepsis.      Problem/Plan - 3:  ·  Problem: RA (rheumatoid arthritis).      Problem/Plan - 4:  ·  Problem: HTN (hypertension).      Problem/Plan - 5:  ·  Problem: ESRD on dialysis.    a 68 yo M with ESRD, RA and HTN, presenting due to nonhealing R 4th toe wound. He has joint issues in setting of RA and was previously treated for foot infection at wound care which healed 1 yr ago. this time infection has gotten worse over the past 3 weeks causing severe pain and prompting ED visit. Podiatry saw pt in ED and recommended vanco/zosyn for suspected osteomyelitis. vitals stable. labs significant for lactate 3.7 improving to 1.7 after IVF. Sed rate 42.     suspected right 4th toe osteomyelitis   sepsis -POA- now ruled out, vitals stable   -podiatry consult for official recs and debridement  -MRI pending, pt would like Morphine before going to MRI machine (states that his back hurts too much to lay in machine)  -vanco (to be dosed intermittently based on level in setting of HD) and zosyn , will ask ID to follow patient as well   -lactate resolved, s/p fluids,   -on regular renal diet, tolerating po well     RA    -on prednisone     HTN   -amlodipine, hydralazine  -monitor closely, might need to add    ESRD   -HD as per Renal      dvtpp-heparin sc

## 2022-12-03 NOTE — PROGRESS NOTE ADULT - ASSESSMENT
67 y.o M with right foot 4th digit dactylitis with wound probing to bone   - Pt was seen and evaluated.   - Afebrile, WBC 5.17, CRP 75, ESR 42.  - X-Ray: Cortical erosions noted on the right foot proximal phalanx progressive RA (resident read)  - Right foot 4th digit dactylitis with medial wound probing to bone, scant gouty tophi visible,  serosanguinous drainage, no malodor, with erythema and signs of infection. No clinical signs of infection to the left foot.   - Recommend admission through medicine.  - Continue IV Abx  - Right foot wound pending   - MRI of the right foot without contrast pending   - At this time patient is refusing any surgical intervention   - Pod Plan: Local wound care versus possible 4th ray resection of the right foot pending MRI results   - Please document medical clearance for surgical intervention under local anesthesia and light IV sedation  - Discussed with attending.   67 y.o M with right foot 4th digit dactylitis with wound probing to bone   - Pt was seen and evaluated.   - Afebrile, WBC 5.17, CRP 75, ESR 42.  - X-Ray: Cortical erosions noted on the right foot proximal phalanx progressive RA (resident read)  - Right foot 4th digit dactylitis with medial wound probing to bone, scant gouty tophi visible,  serosanguinous drainage, no malodor, with erythema and signs of infection. No clinical signs of infection to the left foot.   - Recommend admission through medicine.  - Continue IV Abx  - Local wound care with iodosorb   - Right foot wound pending   - MRI of the right foot without contrast pending   - At this time patient is refusing any surgical intervention   - Pod Plan: Local wound care versus possible 4th ray resection of the right foot pending MRI results   - Please document medical clearance for surgical intervention under local anesthesia and light IV sedation  - Discussed with attending.

## 2022-12-03 NOTE — PATIENT PROFILE ADULT - FUNCTIONAL ASSESSMENT - DAILY ACTIVITY 2.
GASTROINTESTINAL - ADULT    • Minimal or absence of nausea and vomiting Completed    • Maintains or returns to baseline bowel function Completed        PAIN - ADULT    • Verbalizes/displays adequate comfort level or patient's stated pain goal Completed 4 = No assist / stand by assistance

## 2022-12-03 NOTE — H&P ADULT - NSHPPHYSICALEXAM_GEN_ALL_CORE
constitutional: NAD AAOx3  HEENT: PERRLA EOMI  CV: RRR S1S2  Pulm: CTA b/l  GI: soft nontender nondistended + BS   Neuro: CN II-XII grossly intact   Musculoskeletal: no pedal edema or calf tenderness b/l   R 4th toe erythema, wound with odor, eschar.

## 2022-12-03 NOTE — CONSULT NOTE ADULT - ASSESSMENT
67 y.o M with right foot 4th digit dactylitis with wound probing to bone   - Pt was seen and evaluated.   - Afebrile, WBC 5.17, CRP 75, ESR 42.  - X-Ray: Cortical erosions noted on the right foot proximal phalanx progressive RA (resident read)  - Right foot 4th digit dactylitis with medial wound probing to bone, serosanguinous drainage, no malodor, with erythema and signs of infection. Progressive Rheumatoid arthritis B/L with ulnar deviation of the lesser metatarsals. HAV B/L. No clinical SOI to the left foot.   - Using Sterile suture removal kit, the wound was explored, mild serous drainage (~1cc) expressed from medial wound of the 4th digit of the right foot, positive probe to bone, no purulence  - The wound was flushed and dressed with dry sterile dressing   - Recommend admission through medicine.  - Recommend IV antibiotics Vanco/cefepime   - Right foot wound cultured  - Ordered MRI of the right foot without contrast   - Pod Plan: Local wound care versus possible 4th ray resection of the right foot pending MRI results   - Please document medical clearance for surgical intervention under local anesthesia and light IV sedation  - Discussed with attending.

## 2022-12-03 NOTE — CONSULT NOTE ADULT - SUBJECTIVE AND OBJECTIVE BOX
Patient is a 67y old  Male who presents with a chief complaint of     HPI:      PAST MEDICAL & SURGICAL HISTORY:  ESRD on dialysis          MEDICATIONS  (STANDING):  amLODIPine   Tablet 10 milliGRAM(s) Oral daily  buMETAnide 1 milliGRAM(s) Oral daily  heparin   Injectable 5000 Unit(s) SubCutaneous every 8 hours  hydrALAZINE 25 milliGRAM(s) Oral every 8 hours  pantoprazole    Tablet 40 milliGRAM(s) Oral before breakfast  piperacillin/tazobactam IVPB.. 3.375 Gram(s) IV Intermittent every 12 hours  predniSONE   Tablet 40 milliGRAM(s) Oral daily  senna 2 Tablet(s) Oral at bedtime  sodium chloride 0.9%. 1000 milliLiter(s) (100 mL/Hr) IV Continuous <Continuous>  vancomycin  IVPB 1500 milliGRAM(s) IV Intermittent every 24 hours    MEDICATIONS  (PRN):  acetaminophen     Tablet .. 650 milliGRAM(s) Oral every 6 hours PRN Temp greater or equal to 38C (100.4F), Mild Pain (1 - 3)  aluminum hydroxide/magnesium hydroxide/simethicone Suspension 30 milliLiter(s) Oral every 4 hours PRN Dyspepsia  bisacodyl 5 milliGRAM(s) Oral every 12 hours PRN Constipation  melatonin 3 milliGRAM(s) Oral at bedtime PRN Insomnia  ondansetron Injectable 4 milliGRAM(s) IV Push every 8 hours PRN Nausea and/or Vomiting  traMADol 50 milliGRAM(s) Oral every 6 hours PRN Severe Pain (7 - 10)      Allergies    No Known Allergies    Intolerances        VITALS:    Vital Signs Last 24 Hrs  T(C): 36.8 (03 Dec 2022 05:22), Max: 37.2 (02 Dec 2022 16:43)  T(F): 98.3 (03 Dec 2022 05:22), Max: 98.9 (02 Dec 2022 16:43)  HR: 75 (03 Dec 2022 05:22) (75 - 83)  BP: 137/74 (03 Dec 2022 05:22) (123/76 - 168/85)  BP(mean): --  RR: 17 (03 Dec 2022 05:22) (16 - 18)  SpO2: 100% (03 Dec 2022 05:22) (98% - 100%)    Parameters below as of 03 Dec 2022 05:22  Patient On (Oxygen Delivery Method): room air        LABS:                          11.7   5.17  )-----------( 223      ( 03 Dec 2022 02:50 )             35.8       12-03    135  |  101  |  31<H>  ----------------------------<  81  4.0   |  27  |  3.58<H>    Ca    9.1      03 Dec 2022 02:50    TPro  7.7  /  Alb  3.2<L>  /  TBili  0.6  /  DBili  x   /  AST  34  /  ALT  23  /  AlkPhos  73  12-02      CAPILLARY BLOOD GLUCOSE              LOWER EXTREMITY PHYSICAL EXAM:    Vascular: DP/PT 2/4, B/L, CFT <3 seconds B/L, Temperature gradient warm to cool B/L.   Neuro: Epicritic sensation intact to the level of forefoot B/L.  Musculoskeletal/Ortho: progressive Rheumatoid arthritis B/L with ulnar deviation of the lesser metatarsals. HAV B/L.   Skin: Right foot 4th digit dactylitis with medial wound probing to bone, serosanguinous drainage, no malodor, with erythema and signs of infection. No clinical SOI to the left foot.       RADIOLOGY & ADDITIONAL STUDIES:     Patient is a 67y old  Male who presents with a chief complaint of right foot fourth digit dactylitis    HPI: This is a 68 yo M with ESRD, RA and HTN, presenting due to nonhealing R 4th toe wound. He has joint issues in setting of RA and was previously treated for foot infection at wound care which healed 1 yr ago. this time infection has gotten worse over the past 3 weeks causing severe pain and prompting ED visit. Podiatry saw pt in ED and recommended vanco/zosyn for suspected osteomyelitis. vitals stable. labs significant for lactate 3.7 improving to 1.7 after IVF. Sed rate 42.     denies f/c, ams, cp, sob, syncope, abd pain, n/v/d/c.       PAST MEDICAL & SURGICAL HISTORY:  ESRD on dialysis          MEDICATIONS  (STANDING):  amLODIPine   Tablet 10 milliGRAM(s) Oral daily  buMETAnide 1 milliGRAM(s) Oral daily  heparin   Injectable 5000 Unit(s) SubCutaneous every 8 hours  hydrALAZINE 25 milliGRAM(s) Oral every 8 hours  pantoprazole    Tablet 40 milliGRAM(s) Oral before breakfast  piperacillin/tazobactam IVPB.. 3.375 Gram(s) IV Intermittent every 12 hours  predniSONE   Tablet 40 milliGRAM(s) Oral daily  senna 2 Tablet(s) Oral at bedtime  sodium chloride 0.9%. 1000 milliLiter(s) (100 mL/Hr) IV Continuous <Continuous>  vancomycin  IVPB 1500 milliGRAM(s) IV Intermittent every 24 hours    MEDICATIONS  (PRN):  acetaminophen     Tablet .. 650 milliGRAM(s) Oral every 6 hours PRN Temp greater or equal to 38C (100.4F), Mild Pain (1 - 3)  aluminum hydroxide/magnesium hydroxide/simethicone Suspension 30 milliLiter(s) Oral every 4 hours PRN Dyspepsia  bisacodyl 5 milliGRAM(s) Oral every 12 hours PRN Constipation  melatonin 3 milliGRAM(s) Oral at bedtime PRN Insomnia  ondansetron Injectable 4 milliGRAM(s) IV Push every 8 hours PRN Nausea and/or Vomiting  traMADol 50 milliGRAM(s) Oral every 6 hours PRN Severe Pain (7 - 10)      Allergies    No Known Allergies    Intolerances        VITALS:    Vital Signs Last 24 Hrs  T(C): 36.8 (03 Dec 2022 05:22), Max: 37.2 (02 Dec 2022 16:43)  T(F): 98.3 (03 Dec 2022 05:22), Max: 98.9 (02 Dec 2022 16:43)  HR: 75 (03 Dec 2022 05:22) (75 - 83)  BP: 137/74 (03 Dec 2022 05:22) (123/76 - 168/85)  BP(mean): --  RR: 17 (03 Dec 2022 05:22) (16 - 18)  SpO2: 100% (03 Dec 2022 05:22) (98% - 100%)    Parameters below as of 03 Dec 2022 05:22  Patient On (Oxygen Delivery Method): room air        LABS:                          11.7   5.17  )-----------( 223      ( 03 Dec 2022 02:50 )             35.8       12-03    135  |  101  |  31<H>  ----------------------------<  81  4.0   |  27  |  3.58<H>    Ca    9.1      03 Dec 2022 02:50    TPro  7.7  /  Alb  3.2<L>  /  TBili  0.6  /  DBili  x   /  AST  34  /  ALT  23  /  AlkPhos  73  12-02      CAPILLARY BLOOD GLUCOSE              LOWER EXTREMITY PHYSICAL EXAM:    Vascular: DP/PT 2/4, B/L, CFT <3 seconds B/L, Temperature gradient warm to cool B/L.   Neuro: Epicritic sensation intact to the level of forefoot B/L.  Musculoskeletal/Ortho: progressive Rheumatoid arthritis B/L with ulnar deviation of the lesser metatarsals. HAV B/L.   Skin: Right foot 4th digit dactylitis with medial wound probing to bone, serosanguinous drainage, no malodor, with erythema and signs of infection. No clinical SOI to the left foot.       RADIOLOGY & ADDITIONAL STUDIES:

## 2022-12-03 NOTE — PATIENT PROFILE ADULT - FUNCTIONAL ASSESSMENT - BASIC MOBILITY 6.
4-calculated by average/Not able to assess (calculate score using [facility] averaging method)

## 2022-12-04 LAB
HAV IGM SER-ACNC: SIGNIFICANT CHANGE UP
HBV CORE IGM SER-ACNC: SIGNIFICANT CHANGE UP
HBV SURFACE AB SER-ACNC: SIGNIFICANT CHANGE UP
HBV SURFACE AG SER-ACNC: SIGNIFICANT CHANGE UP
HCV AB S/CO SERPL IA: 0.1 S/CO — SIGNIFICANT CHANGE UP (ref 0–0.99)
HCV AB S/CO SERPL IA: 0.11 S/CO — SIGNIFICANT CHANGE UP (ref 0–0.99)
HCV AB SERPL-IMP: SIGNIFICANT CHANGE UP
HCV AB SERPL-IMP: SIGNIFICANT CHANGE UP

## 2022-12-04 PROCEDURE — 99232 SBSQ HOSP IP/OBS MODERATE 35: CPT

## 2022-12-04 RX ADMIN — Medication 40 MILLIGRAM(S): at 05:17

## 2022-12-04 RX ADMIN — Medication 25 MILLIGRAM(S): at 05:16

## 2022-12-04 RX ADMIN — TRAMADOL HYDROCHLORIDE 50 MILLIGRAM(S): 50 TABLET ORAL at 05:17

## 2022-12-04 RX ADMIN — Medication 25 MILLIGRAM(S): at 14:06

## 2022-12-04 RX ADMIN — TRAMADOL HYDROCHLORIDE 50 MILLIGRAM(S): 50 TABLET ORAL at 07:04

## 2022-12-04 RX ADMIN — PANTOPRAZOLE SODIUM 40 MILLIGRAM(S): 20 TABLET, DELAYED RELEASE ORAL at 05:25

## 2022-12-04 RX ADMIN — HEPARIN SODIUM 5000 UNIT(S): 5000 INJECTION INTRAVENOUS; SUBCUTANEOUS at 05:25

## 2022-12-04 RX ADMIN — BUMETANIDE 1 MILLIGRAM(S): 0.25 INJECTION INTRAMUSCULAR; INTRAVENOUS at 05:17

## 2022-12-04 RX ADMIN — SENNA PLUS 2 TABLET(S): 8.6 TABLET ORAL at 22:03

## 2022-12-04 RX ADMIN — TRAMADOL HYDROCHLORIDE 50 MILLIGRAM(S): 50 TABLET ORAL at 17:05

## 2022-12-04 RX ADMIN — AMLODIPINE BESYLATE 10 MILLIGRAM(S): 2.5 TABLET ORAL at 05:17

## 2022-12-04 RX ADMIN — HEPARIN SODIUM 5000 UNIT(S): 5000 INJECTION INTRAVENOUS; SUBCUTANEOUS at 14:06

## 2022-12-04 RX ADMIN — PIPERACILLIN AND TAZOBACTAM 25 GRAM(S): 4; .5 INJECTION, POWDER, LYOPHILIZED, FOR SOLUTION INTRAVENOUS at 17:05

## 2022-12-04 RX ADMIN — Medication 25 MILLIGRAM(S): at 22:03

## 2022-12-04 RX ADMIN — TRAMADOL HYDROCHLORIDE 50 MILLIGRAM(S): 50 TABLET ORAL at 18:05

## 2022-12-04 RX ADMIN — PIPERACILLIN AND TAZOBACTAM 25 GRAM(S): 4; .5 INJECTION, POWDER, LYOPHILIZED, FOR SOLUTION INTRAVENOUS at 05:17

## 2022-12-04 RX ADMIN — Medication 1 APPLICATION(S): at 12:20

## 2022-12-04 NOTE — PROGRESS NOTE ADULT - ASSESSMENT
a 66 yo M with ESRD, RA and HTN, presenting due to nonhealing R 4th toe wound. He has joint issues in setting of RA and was previously treated for foot infection at wound care which healed 1 yr ago. this time infection has gotten worse over the past 3 weeks causing severe pain and prompting ED visit. Podiatry saw pt in ED and recommended vanco/zosyn for suspected osteomyelitis. vitals stable. labs significant for lactate 3.7 improving to 1.7 after IVF. Sed rate 42.     suspected right 4th toe osteomyelitis   sepsis -POA- now ruled out, vitals stable   -podiatry consult for official recs and debridement  -MRI pending, pt would like Morphine before going to MRI machine (states that his back hurts too much to lay in machine)  -vanco (to be dosed intermittently based on level in setting of HD) and zosyn , ID consulted  -lactate resolved, s/p fluids,   -on regular renal diet, tolerating po well     RA    -on prednisone     HTN   -amlodipine, hydralazine  -monitor closely, might need to add    ESRD   -HD as per Renal      dvtpp-heparin sc

## 2022-12-04 NOTE — PROGRESS NOTE ADULT - SUBJECTIVE AND OBJECTIVE BOX
Patient is a 67y old  Male who presents with a chief complaint of     INTERVAL HPI/OVERNIGHT EVENTS: pt doing well, not in acute distress, denies pain     MEDICATIONS  (STANDING):  amLODIPine   Tablet 10 milliGRAM(s) Oral daily  buMETAnide 1 milliGRAM(s) Oral daily  cadexomer iodine 0.9% Gel 1 Application(s) Topical daily  heparin   Injectable 5000 Unit(s) SubCutaneous every 8 hours  hydrALAZINE 25 milliGRAM(s) Oral every 8 hours  pantoprazole    Tablet 40 milliGRAM(s) Oral before breakfast  piperacillin/tazobactam IVPB.. 3.375 Gram(s) IV Intermittent every 12 hours  predniSONE   Tablet 40 milliGRAM(s) Oral daily  senna 2 Tablet(s) Oral at bedtime  sodium chloride 0.9%. 1000 milliLiter(s) (100 mL/Hr) IV Continuous <Continuous>    MEDICATIONS  (PRN):  acetaminophen     Tablet .. 650 milliGRAM(s) Oral every 6 hours PRN Temp greater or equal to 38C (100.4F), Mild Pain (1 - 3)  aluminum hydroxide/magnesium hydroxide/simethicone Suspension 30 milliLiter(s) Oral every 4 hours PRN Dyspepsia  bisacodyl 5 milliGRAM(s) Oral every 12 hours PRN Constipation  melatonin 3 milliGRAM(s) Oral at bedtime PRN Insomnia  ondansetron Injectable 4 milliGRAM(s) IV Push every 8 hours PRN Nausea and/or Vomiting  traMADol 50 milliGRAM(s) Oral every 6 hours PRN Severe Pain (7 - 10)      Allergies    No Known Allergies    Intolerances        REVIEW OF SYSTEMS:  CONSTITUTIONAL: No fever, weight loss, or fatigue  EYES: No eye pain, visual disturbances, or discharge  ENMT:  No difficulty hearing, tinnitus, vertigo; No sinus or throat pain  NECK: No pain or stiffness  BREASTS: No pain, masses, or nipple discharge  RESPIRATORY: No cough, wheezing, chills or hemoptysis; No shortness of breath  CARDIOVASCULAR: No chest pain, palpitations, dizziness, or leg swelling  GASTROINTESTINAL: No abdominal or epigastric pain. No nausea, vomiting, or hematemesis; No diarrhea or constipation. No melena or hematochezia.  GENITOURINARY: No dysuria, frequency, hematuria, or incontinence  NEUROLOGICAL: No headaches, memory loss, loss of strength, numbness, or tremors  SKIN: No itching, burning, rashes, or lesions   LYMPH NODES: No enlarged glands  ENDOCRINE: No heat or cold intolerance; No hair loss  MUSCULOSKELETAL: No joint pain or swelling; No muscle, back, or extremity pain  PSYCHIATRIC: No depression, anxiety, mood swings, or difficulty sleeping  HEME/LYMPH: No easy bruising, or bleeding gums  ALLERGY AND IMMUNOLOGIC: No hives or eczema    Vital Signs Last 24 Hrs  ICU Vital Signs Last 24 Hrs  T(C): 36.4 (04 Dec 2022 12:21), Max: 37.2 (03 Dec 2022 16:35)  T(F): 97.5 (04 Dec 2022 12:21), Max: 99 (03 Dec 2022 16:35)  HR: 70 (04 Dec 2022 12:21) (68 - 87)  BP: 132/74 (04 Dec 2022 12:21) (126/73 - 153/81)  BP(mean): --  ABP: --  ABP(mean): --  RR: 18 (04 Dec 2022 12:21) (18 - 18)  SpO2: 98% (04 Dec 2022 12:21) (98% - 99%)          PHYSICAL EXAM:  GENERAL: NAD, well-groomed, well-developed  HEAD:  Atraumatic, Normocephalic  EYES: EOMI, PERRLA, conjunctiva and sclera clear  ENMT: No tonsillar erythema, exudates, or enlargement; Moist mucous membranes, Good dentition, No lesions  NECK: Supple, No JVD, Normal thyroid  NERVOUS SYSTEM:  Alert & Oriented X3, Good concentration; Motor Strength 5/5 B/L upper and lower extremities; DTRs 2+ intact and symmetric  CHEST/LUNG: Clear to percussion bilaterally; No rales, rhonchi, wheezing, or rubs  HEART: Regular rate and rhythm; No murmurs, rubs, or gallops  ABDOMEN: Soft, Nontender, Nondistended; Bowel sounds present  EXTREMITIES:  2+ Peripheral Pulses, No clubbing, cyanosis, or edema, right toe- 4th digit- with ulceration and erythema/pus   SKIN: No rashes or lesions    LABS:                                            11.7   5.17  )-----------( 223      ( 03 Dec 2022 02:50 )             35.8     12-03    135  |  101  |  31<H>  ----------------------------<  81  4.0   |  27  |  3.58<H>    Ca    9.1      03 Dec 2022 02:50    TPro  7.7  /  Alb  3.2<L>  /  TBili  0.6  /  DBili  x   /  AST  34  /  ALT  23  /  AlkPhos  73  12-02

## 2022-12-05 ENCOUNTER — TRANSCRIPTION ENCOUNTER (OUTPATIENT)
Age: 67
End: 2022-12-05

## 2022-12-05 LAB
-  AMIKACIN: SIGNIFICANT CHANGE UP
-  AMOXICILLIN/CLAVULANIC ACID: SIGNIFICANT CHANGE UP
-  AMPICILLIN/SULBACTAM: SIGNIFICANT CHANGE UP
-  AMPICILLIN: SIGNIFICANT CHANGE UP
-  AZTREONAM: SIGNIFICANT CHANGE UP
-  CEFAZOLIN: SIGNIFICANT CHANGE UP
-  CEFEPIME: SIGNIFICANT CHANGE UP
-  CEFOXITIN: SIGNIFICANT CHANGE UP
-  CEFTRIAXONE: SIGNIFICANT CHANGE UP
-  CIPROFLOXACIN: SIGNIFICANT CHANGE UP
-  ERTAPENEM: SIGNIFICANT CHANGE UP
-  GENTAMICIN: SIGNIFICANT CHANGE UP
-  LEVOFLOXACIN: SIGNIFICANT CHANGE UP
-  MEROPENEM: SIGNIFICANT CHANGE UP
-  PIPERACILLIN/TAZOBACTAM: SIGNIFICANT CHANGE UP
-  TOBRAMYCIN: SIGNIFICANT CHANGE UP
-  TRIMETHOPRIM/SULFAMETHOXAZOLE: SIGNIFICANT CHANGE UP
ALBUMIN SERPL ELPH-MCNC: 2.8 G/DL — LOW (ref 3.3–5)
ALP SERPL-CCNC: 63 U/L — SIGNIFICANT CHANGE UP (ref 40–120)
ALT FLD-CCNC: 33 U/L — SIGNIFICANT CHANGE UP (ref 12–78)
ANION GAP SERPL CALC-SCNC: 11 MMOL/L — SIGNIFICANT CHANGE UP (ref 5–17)
AST SERPL-CCNC: 24 U/L — SIGNIFICANT CHANGE UP (ref 15–37)
BILIRUB SERPL-MCNC: 0.3 MG/DL — SIGNIFICANT CHANGE UP (ref 0.2–1.2)
BUN SERPL-MCNC: 74 MG/DL — HIGH (ref 7–23)
CALCIUM SERPL-MCNC: 9.5 MG/DL — SIGNIFICANT CHANGE UP (ref 8.5–10.1)
CHLORIDE SERPL-SCNC: 107 MMOL/L — SIGNIFICANT CHANGE UP (ref 96–108)
CO2 SERPL-SCNC: 20 MMOL/L — LOW (ref 22–31)
CREAT SERPL-MCNC: 5.58 MG/DL — HIGH (ref 0.5–1.3)
CULTURE RESULTS: SIGNIFICANT CHANGE UP
EGFR: 10 ML/MIN/1.73M2 — LOW
FLUAV AG NPH QL: SIGNIFICANT CHANGE UP
FLUBV AG NPH QL: SIGNIFICANT CHANGE UP
GLUCOSE SERPL-MCNC: 86 MG/DL — SIGNIFICANT CHANGE UP (ref 70–99)
HCT VFR BLD CALC: 35.8 % — LOW (ref 39–50)
HGB BLD-MCNC: 11.5 G/DL — LOW (ref 13–17)
MCHC RBC-ENTMCNC: 32.1 G/DL — SIGNIFICANT CHANGE UP (ref 32–36)
MCHC RBC-ENTMCNC: 32.5 PG — SIGNIFICANT CHANGE UP (ref 27–34)
MCV RBC AUTO: 101.1 FL — HIGH (ref 80–100)
METHOD TYPE: SIGNIFICANT CHANGE UP
NRBC # BLD: 0 /100 WBCS — SIGNIFICANT CHANGE UP (ref 0–0)
ORGANISM # SPEC MICROSCOPIC CNT: SIGNIFICANT CHANGE UP
ORGANISM # SPEC MICROSCOPIC CNT: SIGNIFICANT CHANGE UP
PLATELET # BLD AUTO: 285 K/UL — SIGNIFICANT CHANGE UP (ref 150–400)
POTASSIUM SERPL-MCNC: 3.7 MMOL/L — SIGNIFICANT CHANGE UP (ref 3.5–5.3)
POTASSIUM SERPL-SCNC: 3.7 MMOL/L — SIGNIFICANT CHANGE UP (ref 3.5–5.3)
PROT SERPL-MCNC: 6.7 GM/DL — SIGNIFICANT CHANGE UP (ref 6–8.3)
RBC # BLD: 3.54 M/UL — LOW (ref 4.2–5.8)
RBC # FLD: 14.7 % — HIGH (ref 10.3–14.5)
SARS-COV-2 RNA SPEC QL NAA+PROBE: SIGNIFICANT CHANGE UP
SODIUM SERPL-SCNC: 138 MMOL/L — SIGNIFICANT CHANGE UP (ref 135–145)
SPECIMEN SOURCE: SIGNIFICANT CHANGE UP
WBC # BLD: 6.98 K/UL — SIGNIFICANT CHANGE UP (ref 3.8–10.5)
WBC # FLD AUTO: 6.98 K/UL — SIGNIFICANT CHANGE UP (ref 3.8–10.5)

## 2022-12-05 PROCEDURE — 71045 X-RAY EXAM CHEST 1 VIEW: CPT | Mod: 26

## 2022-12-05 PROCEDURE — 99233 SBSQ HOSP IP/OBS HIGH 50: CPT

## 2022-12-05 PROCEDURE — 99222 1ST HOSP IP/OBS MODERATE 55: CPT

## 2022-12-05 PROCEDURE — 93010 ELECTROCARDIOGRAM REPORT: CPT

## 2022-12-05 RX ADMIN — PIPERACILLIN AND TAZOBACTAM 25 GRAM(S): 4; .5 INJECTION, POWDER, LYOPHILIZED, FOR SOLUTION INTRAVENOUS at 17:43

## 2022-12-05 RX ADMIN — SENNA PLUS 2 TABLET(S): 8.6 TABLET ORAL at 21:10

## 2022-12-05 RX ADMIN — BUMETANIDE 1 MILLIGRAM(S): 0.25 INJECTION INTRAMUSCULAR; INTRAVENOUS at 06:36

## 2022-12-05 RX ADMIN — TRAMADOL HYDROCHLORIDE 50 MILLIGRAM(S): 50 TABLET ORAL at 23:41

## 2022-12-05 RX ADMIN — Medication 25 MILLIGRAM(S): at 14:50

## 2022-12-05 RX ADMIN — TRAMADOL HYDROCHLORIDE 50 MILLIGRAM(S): 50 TABLET ORAL at 07:05

## 2022-12-05 RX ADMIN — PIPERACILLIN AND TAZOBACTAM 25 GRAM(S): 4; .5 INJECTION, POWDER, LYOPHILIZED, FOR SOLUTION INTRAVENOUS at 06:35

## 2022-12-05 RX ADMIN — PANTOPRAZOLE SODIUM 40 MILLIGRAM(S): 20 TABLET, DELAYED RELEASE ORAL at 06:35

## 2022-12-05 RX ADMIN — AMLODIPINE BESYLATE 10 MILLIGRAM(S): 2.5 TABLET ORAL at 06:36

## 2022-12-05 RX ADMIN — HEPARIN SODIUM 5000 UNIT(S): 5000 INJECTION INTRAVENOUS; SUBCUTANEOUS at 21:10

## 2022-12-05 RX ADMIN — Medication 25 MILLIGRAM(S): at 21:10

## 2022-12-05 RX ADMIN — Medication 40 MILLIGRAM(S): at 06:35

## 2022-12-05 RX ADMIN — HEPARIN SODIUM 5000 UNIT(S): 5000 INJECTION INTRAVENOUS; SUBCUTANEOUS at 06:43

## 2022-12-05 RX ADMIN — TRAMADOL HYDROCHLORIDE 50 MILLIGRAM(S): 50 TABLET ORAL at 06:35

## 2022-12-05 RX ADMIN — HEPARIN SODIUM 5000 UNIT(S): 5000 INJECTION INTRAVENOUS; SUBCUTANEOUS at 14:50

## 2022-12-05 RX ADMIN — Medication 25 MILLIGRAM(S): at 06:36

## 2022-12-05 NOTE — PROGRESS NOTE ADULT - ASSESSMENT
67 y.o M with right foot 4th digit dactylitis with wound probing to bone   - Pt was seen and evaluated.   - Afebrile, no leukocytosis   - X-Ray: Right fourth digit with destruction of the PIP joint with dislocation, suspicious for septic arthritis/osteomyelitis. The fifth toe has cortical disruption of the distal phalanx, suspicious for osteomyelitis. Advanced destructive gouty arthritis in the first and second toes.   - Right foot 4th dactylitic and erythematous digit with exposed proximal phalynx, 1cc of gouty tophi expressed from the dorsal wound, serosanuinous drainage, malodorous. No clinical signs of infection to the left foot.   - Recommend admission through medicine.  - Continue IV Abx  - Right foot wound growing proteus mirabilis, staph epidermidis, bacteroides fragilis (prelim)   - MRI of the right foot canceled considering patient is going for surgery tomorrow and no longer medically necessary   - Pod Plan: Patient is scheduled as an add on case tomorrow with Dr. Piedra for Right foot partial fourth ray resection  - Please keep the patient NPO after midnight.  - Please collect labs and coags in the morning.  - Please document medical clearance for surgical intervention under local anesthesia and light IV sedation  - Discussed with attending. 67 y.o M with right foot 4th digit dactylitis with wound probing to bone   - Pt was seen and evaluated.   - Afebrile, no leukocytosis   - X-Ray: Right fourth digit with destruction of the PIP joint with dislocation, suspicious for septic arthritis/osteomyelitis. The fifth toe has cortical disruption of the distal phalanx, suspicious for osteomyelitis. Advanced destructive gouty arthritis in the first and second toes.   - Right foot 4th dactylitic and erythematous digit with exposed proximal phalynx, 1cc of gouty tophi expressed from the dorsal wound, serosanuinous drainage, malodorous. No clinical signs of infection to the left foot.   - Recommend admission through medicine.  - Continue IV Abx  - Right foot wound growing proteus mirabilis, staph epidermidis, bacteroides fragilis (prelim)   - MRI of the right foot canceled considering patient is going for surgery tomorrow and no longer medically necessary   - Pod Plan: Patient is scheduled as an add on case tomorrow with Dr. Piedra for Right foot partial fourth ray resection  - Please keep the patient NPO after midnight.  - Please collect labs and coags in the morning.  - Please complete Chest xray/EKG/COVID exams prior to surgery.   - Please document medical clearance for surgical intervention under local anesthesia and light IV sedation  - Discussed with attending.

## 2022-12-05 NOTE — PROGRESS NOTE ADULT - ASSESSMENT
a 66 yo M with ESRD, RA and HTN, presenting due to nonhealing R 4th toe wound. He has joint issues in setting of RA and was previously treated for foot infection at wound care which healed 1 yr ago. this time infection has gotten worse over the past 3 weeks causing severe pain and prompting ED visit. Podiatry saw pt in ED and recommended vanco/zosyn for suspected osteomyelitis. vitals stable. labs significant for lactate 3.7 improving to 1.7 after IVF. Sed rate 42.     Acute right 4th toe osteomyelitis   sepsis -POA- now ruled out, vitals stable   -cont current iv antibiotics. Discussed with ID and podiatrist in person. Patient agreed with amputation tomorrow. NPO after MN and IVF.   Patient meets > 4 METS at baseline and has no active cardiac issues. Benefits of surgery overweigh the risks. so can proceed with surgery.   Follow labs, EKG and CXR, repeat COVID PCR.   cont iv morphine prn pain.   RA    -on prednisone     HTN   -controlled. cont current meds. Monitor.     ESRD   -HD as per Renal    Legally blind bilaterally.       dvtpp-heparin sc  Full code

## 2022-12-05 NOTE — PROGRESS NOTE ADULT - SUBJECTIVE AND OBJECTIVE BOX
[facility] NEPHROLOGY SERVICES, [facility]  NEPHROLOGY AND HYPERTENSION  [address]  [address]  [place] [address]  [phone]    MD ELLI WHITTAKER MD ANDREY GONCHARUK, MD MADHU KORRAPATI, MD YELENA ROSENBERG, MD RUBEN SOLORZANO, MD JANENE MATTA MD          Patient events noted  No distress      MEDICATIONS  (STANDING):  amLODIPine   Tablet 10 milliGRAM(s) Oral daily  buMETAnide 1 milliGRAM(s) Oral daily  cadexomer iodine 0.9% Gel 1 Application(s) Topical daily  heparin   Injectable 5000 Unit(s) SubCutaneous every 8 hours  hydrALAZINE 25 milliGRAM(s) Oral every 8 hours  morphine  - Injectable 2 milliGRAM(s) IV Push once  pantoprazole    Tablet 40 milliGRAM(s) Oral before breakfast  piperacillin/tazobactam IVPB.. 3.375 Gram(s) IV Intermittent every 12 hours  predniSONE   Tablet 40 milliGRAM(s) Oral daily  senna 2 Tablet(s) Oral at bedtime    MEDICATIONS  (PRN):  acetaminophen     Tablet .. 650 milliGRAM(s) Oral every 6 hours PRN Temp greater or equal to 38C (100.4F), Mild Pain (1 - 3)  aluminum hydroxide/magnesium hydroxide/simethicone Suspension 30 milliLiter(s) Oral every 4 hours PRN Dyspepsia  bisacodyl 5 milliGRAM(s) Oral every 12 hours PRN Constipation  melatonin 3 milliGRAM(s) Oral at bedtime PRN Insomnia  ondansetron Injectable 4 milliGRAM(s) IV Push every 8 hours PRN Nausea and/or Vomiting  traMADol 50 milliGRAM(s) Oral every 6 hours PRN Severe Pain (7 - 10)      12-04-22 @ 07:01  -  12-05-22 @ 07:00  --------------------------------------------------------  IN: 720 mL / OUT: 0 mL / NET: 720 mL    12-05-22 @ 07:01  -  12-05-22 @ 21:10  --------------------------------------------------------  IN: 360 mL / OUT: 0 mL / NET: 360 mL      PHYSICAL EXAM:      T(C): 36.8 (12-05-22 @ 17:16), Max: 36.8 (12-04-22 @ 23:57)  HR: 80 (12-05-22 @ 17:16) (60 - 80)  BP: 132/84 (12-05-22 @ 17:16) (122/70 - 160/80)  RR: 18 (12-05-22 @ 17:16) (16 - 18)  SpO2: 97% (12-05-22 @ 17:16) (97% - 100%)  Wt(kg): --  Lungs clear  Heart S1S2  Abd soft NT ND  Extremities:   tr edema                                    11.5   6.98  )-----------( 285      ( 05 Dec 2022 07:00 )             35.8     12-05    138  |  107  |  74<H>  ----------------------------<  86  3.7   |  20<L>  |  5.58<H>    Ca    9.5      05 Dec 2022 07:00    TPro  6.7  /  Alb  2.8<L>  /  TBili  0.3  /  DBili  x   /  AST  24  /  ALT  33  /  AlkPhos  63  12-05      LIVER FUNCTIONS - ( 05 Dec 2022 07:00 )  Alb: 2.8 g/dL / Pro: 6.7 gm/dL / ALK PHOS: 63 U/L / ALT: 33 U/L / AST: 24 U/L / GGT: x           Creatinine Trend: 5.58<--, 3.58<--, 3.40<--    Right fourth digit with destruction of the PIP joint with dislocation,   suspicious for septic arthritis/osteomyelitis. The fifth toe has cortical   disruption of the distal phalanx, suspicious for osteomyelitis. 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       Assessment   ESRD; maintenance   4th digit dactylitis         Plan:  HD for today  For OR tomorrow    Marcel Carmona MD

## 2022-12-05 NOTE — CONSULT NOTE ADULT - ASSESSMENT
68 yo M with ESRD, RA and HTN, presenting due to nonhealing R 4th toe wound. He has joint issues in setting of RA and was previously treated for foot infection at wound care which healed 1 yr ago. this time infection has gotten worse over the past 3 weeks causing severe pain and prompting ED visit.   Sed rate 42.   xray: Right fourth digit with destruction of the PIP joint with dislocation, suspicious for septic arthritis/osteomyelitis.  patient clearly has osteomyelitis of his toe and MRI likely does not need to be done   discussed with patient the risks of not amputating including long term antibiotics, spreading infection and higher amputation   risks of surgery per podiatry   Vascular Arterial lower extremity bilateral 10.29.21 No evidence of arterial occlusion in the bilateral lower extremities.  Elevated velocities within the bilateral lower extremity arteries, as above, compatible with segmental stenoses.     Plan:  continue (Zosyn) Piperacillin/tazobactam 3.375 grams every 12 hours  follow all cultures   amputation of 4th toe tomorrow as add on case   please send clean bone margins and clean bone cultures   Continue hemodialysis and keep net negative fluid balance     Discussed with Dr. Montgomery and Dr. Dorothea Marie, DO  Infectious Disease Attending  Reachable via Microsoft Teams or ID office: [phone]  After 5pm/weekends please call [phone] for all inquiries and new consults

## 2022-12-05 NOTE — PROGRESS NOTE ADULT - SUBJECTIVE AND OBJECTIVE BOX
Patient is a 67y old  Male who presents with a chief complaint of      INTERVAL HPI/OVERNIGHT EVENTS:  Patient seen and evaluated at bedside.  Pt is resting comfortable in NAD. Denies N/V/F/C.    Allergies    No Known Allergies    Intolerances        Vital Signs Last 24 Hrs  T(C): 36.5 (05 Dec 2022 05:28), Max: 36.8 (04 Dec 2022 23:57)  T(F): 97.7 (05 Dec 2022 05:28), Max: 98.2 (04 Dec 2022 23:57)  HR: 60 (05 Dec 2022 05:28) (60 - 73)  BP: 127/74 (05 Dec 2022 05:28) (122/70 - 150/77)  BP(mean): --  RR: 18 (05 Dec 2022 05:28) (18 - 18)  SpO2: 100% (05 Dec 2022 05:28) (98% - 100%)    Parameters below as of 05 Dec 2022 05:28  Patient On (Oxygen Delivery Method): room air        LABS:                        11.5   6.98  )-----------( 285      ( 05 Dec 2022 07:00 )             35.8     12-05    138  |  107  |  74<H>  ----------------------------<  86  3.7   |  20<L>  |  5.58<H>    Ca    9.5      05 Dec 2022 07:00    TPro  6.7  /  Alb  2.8<L>  /  TBili  0.3  /  DBili  x   /  AST  24  /  ALT  33  /  AlkPhos  63  12-05        CAPILLARY BLOOD GLUCOSE          Lower Extremity Physical Exam:  Vascular: DP/PT 2/4, B/L, CFT <3 seconds B/L, Temperature gradient warm to cool B/L.   Neuro: Epicritic sensation intact to the level of forefoot B/L.  Musculoskeletal/Ortho: progressive Rheumatoid arthritis B/L with ulnar deviation of the lesser metatarsals. HAV B/L.   Skin: Right fourth digit with destruction of the PIP joint with dislocation, suspicious for septic arthritis/osteomyelitis. The fifth toe has cortical disruption of the distal phalanx, suspicious for osteomyelitis. Advanced destructive gouty arthritis in the first and second toes.     RADIOLOGY & ADDITIONAL TESTS:  < from: Xray Toes, Right Foot (12.02.22 @ 23:36) >    ACC: [id number] EXAM:  XR TOE(S) MIN 2 VIEWS RT                          PROCEDURE DATE:  12/02/2022          INTERPRETATION:  INDICATIONS: 67-year-old male with fourth toe wound.    COMPARISON: 10/27/2021    FINDINGS: 3 views of the right foot. Deformities of the forefoot and   toes. The right fourth digit identified with dislocation of the middle   phalanx from the proximal phalanx. Ill-defined cortical boundaries of the   proximal and middle phalanx of the fourth toe suggest cortical   disruption. Septic arthritis or osteomyelitis at this level cannot be   excluded.    Similarly, the fifth toe shows cortical disruption of the distal phalanx,   suspicious for osteomyelitis. These findings have progressed since prior   exam from 10/27/2021. In addition, there is widening and cortical   irregularity of the first and second toe proximal interphalangeal joints   which appears unchanged in comparison to prior. Findings are suggestive   of destructive gouty arthritis. In addition soft tissue dystrophic   calcification adjacent to the lateral mid foot of unclear etiology.   Vascular calcifications are present.      IMPRESSION:    Right fourth digit with destruction of the PIP joint with dislocation,   suspicious for septic arthritis/osteomyelitis. The fifth toe has cortical   disruption of the distal phalanx, suspicious for osteomyelitis. 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   Recommend MRI for further evaluation.    --- End of Report ---            PAULY ABRRAZA MD; Attending Interventional Radiologist  This document has been electronically signed. Dec  3 2022 11:01AM    < end of copied text >

## 2022-12-05 NOTE — PROGRESS NOTE ADULT - SUBJECTIVE AND OBJECTIVE BOX
CHIEF COMPLAINT: Follow up of right 4th toe osteomyelitis  Pain on palpation  no fever  no n/v/d/abd pain/sob/cp    PHYSICAL EXAM:  GENERAL: Moderately built, no acute distress   CHEST/LUNG: Clear to ausculation bilaterally, no wheezing, no crackles   HEART: Regular rate and rhythm; No murmurs, rubs  ABDOMEN: Soft, Nontender, Nondistended; Bowel sounds present  EXTREMITIES:  Moving all four extremities spontaneously, No clubbing, cyanosis, or edema. dressing right foot.   NERVOUS SYSTEM:  Grossly non focal.  Psychiatry: AAO x 3, mood is appropriate       OBJECTIVE DATA:   Vital Signs Last 24 Hrs  T(C): 36.5 (05 Dec 2022 05:28), Max: 36.8 (04 Dec 2022 23:57)  T(F): 97.7 (05 Dec 2022 05:28), Max: 98.2 (04 Dec 2022 23:57)  HR: 60 (05 Dec 2022 05:28) (60 - 73)  BP: 127/74 (05 Dec 2022 05:28) (122/70 - 150/77)  BP(mean): --  RR: 18 (05 Dec 2022 05:28) (18 - 18)  SpO2: 100% (05 Dec 2022 05:28) (100% - 100%)    Parameters below as of 05 Dec 2022 05:28  Patient On (Oxygen Delivery Method): room air               Daily     Daily   LABS:                        11.5   6.98  )-----------( 285      ( 05 Dec 2022 07:00 )             35.8             12-05    138  |  107  |  74<H>  ----------------------------<  86  3.7   |  20<L>  |  5.58<H>    Ca    9.5      05 Dec 2022 07:00    TPro  6.7  /  Alb  2.8<L>  /  TBili  0.3  /  DBili  x   /  AST  24  /  ALT  33  /  AlkPhos  63  12-05                        Culture - Abscess with Gram Stain  Source: .Abscess right foot  Final Report (12-05):    Numerous Proteus mirabilis    Few Staphylococcus epidermidis "Susceptibilities not performed"    Numerous Bacteroides fragilis "Susceptibilities not performed"  Organism: Proteus mirabilis (12-05)  Organism: Proteus mirabilis (12-05)    Sensitivities:      -  Amikacin: S <=16      -  Amoxicillin/Clavulanic Acid: S <=8/4      -  Ampicillin: S <=8 These ampicillin results predict results for amoxicillin      -  Ampicillin/Sulbactam: S <=4/2 Enterobacter, Klebsiella aerogenes, Citrobacter, and Serratia may develop resistance during prolonged therapy (3-4 days)      -  Aztreonam: S <=4      -  Cefazolin: I 4 Enterobacter, Klebsiella aerogenes, Citrobacter, and Serratia may develop resistance during prolonged therapy (3-4 days)      -  Cefepime: S <=2      -  Cefoxitin: S <=8      -  Ceftriaxone: S <=1 Enterobacter, Klebsiella aerogenes, Citrobacter, and Serratia may develop resistance during prolonged therapy      -  Ciprofloxacin: S <=0.25      -  Ertapenem: S <=0.5      -  Gentamicin: S <=2      -  Levofloxacin: S <=0.5      -  Meropenem: S <=1      -  Piperacillin/Tazobactam: S <=8      -  Tobramycin: S <=2      -  Trimethoprim/Sulfamethoxazole: S <=0.5/9.5      Method Type: JOCE         Interval Radiology studies: reviewed by me    < from: Xray Toes, Right Foot (12.02.22 @ 23:36) >  Right fourth digit with destruction of the PIP joint with dislocation,   suspicious for septic arthritis/osteomyelitis. The fifth toe has cortical   disruption of the distal phalanx, suspicious for osteomyelitis. 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   Recommend MRI for further evaluation.    < end of copied text >      MEDICATIONS  (STANDING):  amLODIPine   Tablet 10 milliGRAM(s) Oral daily  buMETAnide 1 milliGRAM(s) Oral daily  cadexomer iodine 0.9% Gel 1 Application(s) Topical daily  heparin   Injectable 5000 Unit(s) SubCutaneous every 8 hours  hydrALAZINE 25 milliGRAM(s) Oral every 8 hours  morphine  - Injectable 2 milliGRAM(s) IV Push once  pantoprazole    Tablet 40 milliGRAM(s) Oral before breakfast  piperacillin/tazobactam IVPB.. 3.375 Gram(s) IV Intermittent every 12 hours  predniSONE   Tablet 40 milliGRAM(s) Oral daily  senna 2 Tablet(s) Oral at bedtime    MEDICATIONS  (PRN):  acetaminophen     Tablet .. 650 milliGRAM(s) Oral every 6 hours PRN Temp greater or equal to 38C (100.4F), Mild Pain (1 - 3)  aluminum hydroxide/magnesium hydroxide/simethicone Suspension 30 milliLiter(s) Oral every 4 hours PRN Dyspepsia  bisacodyl 5 milliGRAM(s) Oral every 12 hours PRN Constipation  melatonin 3 milliGRAM(s) Oral at bedtime PRN Insomnia  ondansetron Injectable 4 milliGRAM(s) IV Push every 8 hours PRN Nausea and/or Vomiting  traMADol 50 milliGRAM(s) Oral every 6 hours PRN Severe Pain (7 - 10)

## 2022-12-05 NOTE — PROGRESS NOTE ADULT - ATTENDING COMMENTS
Bone is now exposed Recommending toe amp to minimize antibiotics   Consent signed   All risk benefits and alternatives discussed   Spoke to patient and his son

## 2022-12-05 NOTE — CONSULT NOTE ADULT - SUBJECTIVE AND OBJECTIVE BOX
[facility]  INFECTIOUS DISEASES   [address] [place] [address]  Tel: [phone]     Fax: [phone]  ======================================================  Velez MD Jonathan Garellek, DO Letitia Briscoe, ZACH   ======================================================    MARLA WELLS III  MRN-[id number]        Patient is a 67y old  Male who presents with a chief complaint of     HPI:  This is a 66 yo M with ESRD, RA and HTN, presenting due to nonhealing R 4th toe wound. He has joint issues in setting of RA and was previously treated for foot infection at wound care which healed 1 yr ago. this time infection has gotten worse over the past 3 weeks causing severe pain and prompting ED visit. Podiatry saw pt in ED and recommended vanco/zosyn for suspected osteomyelitis. vitals stable. labs significant for lactate 3.7 improving to 1.7 after IVF. Sed rate 42.     denies f/c, ams, cp, sob, syncope, abd pain, n/v/d/c.  (03 Dec 2022 06:07)      ID consulted for workup and antibiotic management     PAST MEDICAL & SURGICAL HISTORY:  ESRD on dialysis          Allergies  No Known Allergies        ANTIMICROBIALS:  piperacillin/tazobactam IVPB.. 3.375 every 12 hours      MEDICATIONS  (STANDING):  piperacillin/tazobactam IVPB..   25 mL/Hr IV Intermittent (12-05-22 @ 17:43)   25 mL/Hr IV Intermittent (12-05-22 @ 06:35)   25 mL/Hr IV Intermittent (12-04-22 @ 17:05)   25 mL/Hr IV Intermittent (12-04-22 @ 05:17)   25 mL/Hr IV Intermittent (12-03-22 @ 18:54)   25 mL/Hr IV Intermittent (12-03-22 @ 05:30)    piperacillin/tazobactam IVPB...   200 mL/Hr IV Intermittent (12-02-22 @ 20:11)    vancomycin  IVPB.   250 mL/Hr IV Intermittent (12-02-22 @ 20:54)        OTHER MEDS: MEDICATIONS  (STANDING):  acetaminophen     Tablet .. 650 every 6 hours PRN  aluminum hydroxide/magnesium hydroxide/simethicone Suspension 30 every 4 hours PRN  amLODIPine   Tablet 10 daily  bisacodyl 5 every 12 hours PRN  buMETAnide 1 daily  heparin   Injectable 5000 every 8 hours  hydrALAZINE 25 every 8 hours  melatonin 3 at bedtime PRN  morphine  - Injectable 2 once  ondansetron Injectable 4 every 8 hours PRN  pantoprazole    Tablet 40 before breakfast  predniSONE   Tablet 40 daily  senna 2 at bedtime  traMADol 50 every 6 hours PRN      SOCIAL HISTORY:       FAMILY HISTORY:  No pertinent family history in first degree relatives        REVIEW OF SYSTEMS  [  ] ROS unobtainable because:    [  ] All other systems negative except as noted below:	    Constitutional:  [ ] fever [ ] chills  [ ] weight loss  [ ] weakness  Skin:  [ ] rash [ ] phlebitis	  Eyes: [ ] icterus [ ] pain  [ ] discharge	  ENMT: [ ] sore throat  [ ] thrush [ ] ulcers [ ] exudates  Respiratory: [ ] dyspnea [ ] hemoptysis [ ] cough [ ] sputum	  Cardiovascular:  [ ] chest pain [ ] palpitations [ ] edema	  Gastrointestinal:  [ ] nausea [ ] vomiting [ ] diarrhea [ ] constipation [ ] pain	  Genitourinary:  [ ] dysuria [ ] frequency [ ] hematuria [ ] discharge [ ] flank pain  [ ] incontinence  Musculoskeletal:  [ ] myalgias [ ] arthralgias [ ] arthritis  [ ] back pain  Neurological:  [ ] headache [ ] seizures  [ ] confusion/altered mental status  Psychiatric:  [ ] anxiety [ ] depression	  Hematology/Lymphatics:  [ ] lymphadenopathy  Endocrine:  [ ] adrenal [ ] thyroid  Allergic/Immunologic:	 [ ] transplant [ ] seasonal    Vital Signs Last 24 Hrs  T(F): 98.3 (12-05-22 @ 17:16), Max: 99 (12-03-22 @ 16:35)    Vital Signs Last 24 Hrs  HR: 80 (12-05-22 @ 17:16) (60 - 80)  BP: 132/84 (12-05-22 @ 17:16) (122/70 - 160/80)  RR: 18 (12-05-22 @ 17:16)  SpO2: 97% (12-05-22 @ 17:16) (97% - 100%)  Wt(kg): --    PHYSICAL EXAM:  Constitutional: non-toxic, no distress  HEAD/EYES: anicteric, no conjunctival injection  ENT:  supple, no thrush, blind in right eye, +strabismus    Cardiovascular:   normal S1, S2, no murmur, no edema, +right chest permacath   Respiratory:  clear BS bilaterally, no wheezes, no rales  GI:  soft, non-tender, normal bowel sounds  :  no arellano, no CVA tenderness  Musculoskeletal:  no synovitis, gouty changes in fingers and toes   Neurologic: awake and alert, normal strength, no focal findings  Skin:  no rash, 4th digit with open wound, calor extending up the foot,  Heme/Onc: no lymphadenopathy   Psychiatric:  awake, alert, appropriate mood          WBC Count: 6.98 K/uL (12-05 @ 07:00)  WBC Count: 5.17 K/uL (12-03 @ 02:50)  WBC Count: 5.22 K/uL (12-02 @ 19:54)      Auto Neutrophil %: 57.0 % (12-03-22 @ 02:50)  Auto Neutrophil #: 2.95 K/uL (12-03-22 @ 02:50)  Auto Neutrophil %: 64.9 % (12-02-22 @ 19:54)  Auto Neutrophil #: 3.39 K/uL (12-02-22 @ 19:54)                            11.5   6.98  )-----------( 285      ( 05 Dec 2022 07:00 )             35.8       12-05    138  |  107  |  74<H>  ----------------------------<  86  3.7   |  20<L>  |  5.58<H>    Ca    9.5      05 Dec 2022 07:00    TPro  6.7  /  Alb  2.8<L>  /  TBili  0.3  /  DBili  x   /  AST  24  /  ALT  33  /  AlkPhos  63  12-05      Creatinine Trend: 5.58<--, 3.58<--, 3.40<--    MICROBIOLOGY:  .Abscess right foot  12-03-22   Numerous Proteus mirabilis  Few Staphylococcus epidermidis "Susceptibilities not performed"  Numerous Bacteroides fragilis "Susceptibilities not performed"  --  Proteus mirabilis        C-Reactive Protein, Serum: 66 (12-03)      SARS-CoV-2 Result: NotDetec (12-05-22 @ 15:00)  SARS-CoV-2 Result: NotDetec (12-03-22 @ 07:50)      RADIOLOGY:  < from: Xray Toes, Right Foot (12.02.22 @ 23:36) >  IMPRESSION:    Right fourth digit with destruction of the PIP joint with dislocation,   suspicious for septic arthritis/osteomyelitis. The fifth toe has cortical   disruption of the distal phalanx, suspicious for osteomyelitis. 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   Recommend MRI for further evaluation.    < end of copied text >

## 2022-12-06 ENCOUNTER — RESULT REVIEW (OUTPATIENT)
Age: 67
End: 2022-12-06

## 2022-12-06 ENCOUNTER — TRANSCRIPTION ENCOUNTER (OUTPATIENT)
Age: 67
End: 2022-12-06

## 2022-12-06 LAB
ANION GAP SERPL CALC-SCNC: 10 MMOL/L — SIGNIFICANT CHANGE UP (ref 5–17)
APTT BLD: 38.4 SEC — HIGH (ref 27.5–35.5)
BUN SERPL-MCNC: 53 MG/DL — HIGH (ref 7–23)
CALCIUM SERPL-MCNC: 9.6 MG/DL — SIGNIFICANT CHANGE UP (ref 8.5–10.1)
CHLORIDE SERPL-SCNC: 102 MMOL/L — SIGNIFICANT CHANGE UP (ref 96–108)
CO2 SERPL-SCNC: 27 MMOL/L — SIGNIFICANT CHANGE UP (ref 22–31)
CREAT SERPL-MCNC: 4.48 MG/DL — HIGH (ref 0.5–1.3)
EGFR: 14 ML/MIN/1.73M2 — LOW
GLUCOSE BLDC GLUCOMTR-MCNC: 101 MG/DL — HIGH (ref 70–99)
GLUCOSE BLDC GLUCOMTR-MCNC: 115 MG/DL — HIGH (ref 70–99)
GLUCOSE BLDC GLUCOMTR-MCNC: 93 MG/DL — SIGNIFICANT CHANGE UP (ref 70–99)
GLUCOSE BLDC GLUCOMTR-MCNC: 96 MG/DL — SIGNIFICANT CHANGE UP (ref 70–99)
GLUCOSE SERPL-MCNC: 89 MG/DL — SIGNIFICANT CHANGE UP (ref 70–99)
HCT VFR BLD CALC: 37.3 % — LOW (ref 39–50)
HGB BLD-MCNC: 12 G/DL — LOW (ref 13–17)
INR BLD: 1.04 RATIO — SIGNIFICANT CHANGE UP (ref 0.88–1.16)
MCHC RBC-ENTMCNC: 32 PG — SIGNIFICANT CHANGE UP (ref 27–34)
MCHC RBC-ENTMCNC: 32.2 G/DL — SIGNIFICANT CHANGE UP (ref 32–36)
MCV RBC AUTO: 99.5 FL — SIGNIFICANT CHANGE UP (ref 80–100)
NRBC # BLD: 0 /100 WBCS — SIGNIFICANT CHANGE UP (ref 0–0)
PLATELET # BLD AUTO: 275 K/UL — SIGNIFICANT CHANGE UP (ref 150–400)
POTASSIUM SERPL-MCNC: 3.7 MMOL/L — SIGNIFICANT CHANGE UP (ref 3.5–5.3)
POTASSIUM SERPL-SCNC: 3.7 MMOL/L — SIGNIFICANT CHANGE UP (ref 3.5–5.3)
PROTHROM AB SERPL-ACNC: 12.4 SEC — SIGNIFICANT CHANGE UP (ref 10.5–13.4)
RBC # BLD: 3.75 M/UL — LOW (ref 4.2–5.8)
RBC # FLD: 14.9 % — HIGH (ref 10.3–14.5)
SODIUM SERPL-SCNC: 139 MMOL/L — SIGNIFICANT CHANGE UP (ref 135–145)
WBC # BLD: 6.97 K/UL — SIGNIFICANT CHANGE UP (ref 3.8–10.5)
WBC # FLD AUTO: 6.97 K/UL — SIGNIFICANT CHANGE UP (ref 3.8–10.5)

## 2022-12-06 PROCEDURE — 99233 SBSQ HOSP IP/OBS HIGH 50: CPT

## 2022-12-06 PROCEDURE — 99232 SBSQ HOSP IP/OBS MODERATE 35: CPT

## 2022-12-06 PROCEDURE — 88311 DECALCIFY TISSUE: CPT | Mod: 26

## 2022-12-06 PROCEDURE — 88304 TISSUE EXAM BY PATHOLOGIST: CPT | Mod: 26

## 2022-12-06 RX ORDER — SODIUM CHLORIDE 9 MG/ML
1000 INJECTION INTRAMUSCULAR; INTRAVENOUS; SUBCUTANEOUS
Refills: 0 | Status: DISCONTINUED | OUTPATIENT
Start: 2022-12-06 | End: 2022-12-06

## 2022-12-06 RX ORDER — ONDANSETRON 8 MG/1
4 TABLET, FILM COATED ORAL ONCE
Refills: 0 | Status: DISCONTINUED | OUTPATIENT
Start: 2022-12-06 | End: 2022-12-06

## 2022-12-06 RX ORDER — ACETAMINOPHEN 500 MG
650 TABLET ORAL EVERY 6 HOURS
Refills: 0 | Status: DISCONTINUED | OUTPATIENT
Start: 2022-12-06 | End: 2022-12-09

## 2022-12-06 RX ORDER — FENTANYL CITRATE 50 UG/ML
25 INJECTION INTRAVENOUS
Refills: 0 | Status: DISCONTINUED | OUTPATIENT
Start: 2022-12-06 | End: 2022-12-06

## 2022-12-06 RX ADMIN — TRAMADOL HYDROCHLORIDE 50 MILLIGRAM(S): 50 TABLET ORAL at 21:02

## 2022-12-06 RX ADMIN — PIPERACILLIN AND TAZOBACTAM 25 GRAM(S): 4; .5 INJECTION, POWDER, LYOPHILIZED, FOR SOLUTION INTRAVENOUS at 18:17

## 2022-12-06 RX ADMIN — Medication 25 MILLIGRAM(S): at 22:02

## 2022-12-06 RX ADMIN — Medication 25 MILLIGRAM(S): at 05:36

## 2022-12-06 RX ADMIN — AMLODIPINE BESYLATE 10 MILLIGRAM(S): 2.5 TABLET ORAL at 05:36

## 2022-12-06 RX ADMIN — MORPHINE SULFATE 2 MILLIGRAM(S): 50 CAPSULE, EXTENDED RELEASE ORAL at 18:40

## 2022-12-06 RX ADMIN — BUMETANIDE 1 MILLIGRAM(S): 0.25 INJECTION INTRAMUSCULAR; INTRAVENOUS at 05:36

## 2022-12-06 RX ADMIN — TRAMADOL HYDROCHLORIDE 50 MILLIGRAM(S): 50 TABLET ORAL at 20:02

## 2022-12-06 RX ADMIN — PIPERACILLIN AND TAZOBACTAM 25 GRAM(S): 4; .5 INJECTION, POWDER, LYOPHILIZED, FOR SOLUTION INTRAVENOUS at 05:36

## 2022-12-06 RX ADMIN — HEPARIN SODIUM 5000 UNIT(S): 5000 INJECTION INTRAVENOUS; SUBCUTANEOUS at 22:02

## 2022-12-06 RX ADMIN — HEPARIN SODIUM 5000 UNIT(S): 5000 INJECTION INTRAVENOUS; SUBCUTANEOUS at 06:17

## 2022-12-06 RX ADMIN — MORPHINE SULFATE 2 MILLIGRAM(S): 50 CAPSULE, EXTENDED RELEASE ORAL at 18:17

## 2022-12-06 RX ADMIN — Medication 40 MILLIGRAM(S): at 05:36

## 2022-12-06 NOTE — BRIEF OPERATIVE NOTE - SPECIMENS
Pathology: right foot 4th ray bone skin, and gout , clear border Pathology: right foot 4th ray bone, skin, and gout clear border; no microbiology sent

## 2022-12-06 NOTE — DISCHARGE NOTE PROVIDER - ATTENDING DISCHARGE PHYSICAL EXAMINATION:
Vital Signs Last 24 Hrs  T(C): 36.5 (09 Dec 2022 13:39), Max: 36.9 (09 Dec 2022 12:15)  T(F): 97.7 (09 Dec 2022 13:39), Max: 98.4 (09 Dec 2022 12:15)  HR: 76 (09 Dec 2022 13:39) (66 - 78)  BP: 129/80 (09 Dec 2022 13:39) (127/72 - 150/84)  BP(mean): --  RR: 17 (09 Dec 2022 13:39) (16 - 19)  SpO2: 98% (09 Dec 2022 13:39) (93% - 100%)    Parameters below as of 09 Dec 2022 12:15  Patient On (Oxygen Delivery Method): room air            GENERAL: NAD  HEAD:  Atraumatic, Normocephalic  EYES: EOMI, PERRLA, conjunctiva and sclera clear  ENMT: No tonsillar erythema, exudates, or enlargement; Moist mucous membranes, Good dentition, No lesions  NECK: Supple, No JVD, Normal thyroid  NERVOUS SYSTEM:  Alert & Oriented X3, Good concentration; Motor Strength 5/5 B/L upper and lower extremities;   CHEST/LUNG: Clear to percussion bilaterally;   HEART: Regular rate and rhythm;   ABDOMEN: Soft, Nontender, Nondistended; Bowel sounds present  EXTREMITIES:  No clubbing, cyanosis, or edema  SKIN: No rashes or lesions

## 2022-12-06 NOTE — DISCHARGE NOTE PROVIDER - NSDCFUADDAPPT_GEN_ALL_CORE_FT
Podiatry Discharge Instructions:  Follow up: Please follow up with Dr. Pidera within 1 week of discharge from the hospital, please call [phone] for appointment and discuss that you recently were seen in the hospital.  Wound Care: Please leave your dressing clean dry intact until your follow up appointment   Weight bearing: Please weight bear as tolerated in a surgical shoe.  Antibiotics: Please continue as instructed.

## 2022-12-06 NOTE — BRIEF OPERATIVE NOTE - OPERATION/FINDINGS
s/p right foot fourth ray resection: multiple gouty tophi noted at the MPJ joint as well as . minimal intra-op bleeding. one pensorse drain inputted. closed with 3.vicryl and 2.0 nylon. s/p right foot fourth ray resection: multiple gouty tophi noted at the MPJ joint as well as within bone structure . minimal intra-op bleeding. one pensorse drain inputted. closed with 3.0 vicryl and 2.0 nylon. s/p right foot partial fourth ray resection: multiple gouty tophi noted at the MPJ joint as well as within bone structure . minimal intra-op bleeding. one pensorse drain inputted. closed with 3.0 vicryl and 2.0 nylon.

## 2022-12-06 NOTE — DISCHARGE NOTE PROVIDER - CARE PROVIDER_API CALL
Sheryl Piedra (DPM)  Podiatric Medicine and Surgery  [address] [place] [address]  Phone: [phone]  Fax: [phone]  Follow Up Time:

## 2022-12-06 NOTE — BRIEF OPERATIVE NOTE - NSICDXBRIEFPOSTOP_GEN_ALL_CORE_FT
POST-OP DIAGNOSIS:  Osteomyelitis of toe of right foot 06-Dec-2022 15:44:50  Yara Hennessy  Gout of right foot 06-Dec-2022 15:47:08  Yara Hennessy

## 2022-12-06 NOTE — DISCHARGE NOTE PROVIDER - NSDCMRMEDTOKEN_GEN_ALL_CORE_FT
amLODIPine 10 mg oral tablet: 1 tab(s) orally once a day MDD:1  Augmentin 500 mg-125 mg oral tablet: 1 tab(s) orally every 12 hours MDD:2 take medication twice a day until 11/10/2021  bisacodyl 5 mg oral delayed release tablet: 1 tab(s) orally every 12 hours, As needed, Constipation  bumetanide 1 mg oral tablet: 1 tab(s) orally once a day  febuxostat 40 mg oral tablet: 1 tab(s) orally once a day  hydrALAZINE 25 mg oral tablet: 1 tab(s) orally every 8 hours  metroNIDAZOLE 500 mg oral tablet: 1 tab(s) orally every 8 hours  pantoprazole 40 mg oral granule, delayed release: 1 tab(s) orally once a day   predniSONE 20 mg oral tablet: 2 tab(s) orally once a day  predniSONE 20 mg oral tablet: 2 tab(s) orally once a day  senna oral tablet: 2 tab(s) orally once a day (at bedtime)  sodium bicarbonate 650 mg oral tablet: 1 tab(s) orally 3 times a day  traMADol 50 mg oral tablet: 1 tab(s) orally every 6 hours, As needed, Moderate Pain (4 - 6) MDD:4   amLODIPine 10 mg oral tablet: 1 tab(s) orally once a day  amoxicillin-clavulanate 875 mg-125 mg oral tablet: 1 tab(s) orally every 12 hours   bumetanide 1 mg oral tablet: 1 tab(s) orally once a day  febuxostat 40 mg oral tablet: 1 tab(s) orally once a day  hydrALAZINE 25 mg oral tablet: 1 tab(s) orally every 8 hours  pantoprazole 40 mg oral delayed release tablet: 1 tab(s) orally once a day (before a meal)  predniSONE 20 mg oral tablet: 2 tab(s) orally once a day  senna oral tablet: 2 tab(s) orally once a day (at bedtime)  traMADol 50 mg oral tablet: 1 tab(s) orally every 6 hours, As needed, Moderate Pain (4 - 6) MDD:4

## 2022-12-06 NOTE — DISCHARGE NOTE PROVIDER - DETAILS OF MALNUTRITION DIAGNOSIS/DIAGNOSES
This patient has been assessed with a concern for Malnutrition and was treated during this hospitalization for the following Nutrition diagnosis/diagnoses:     -  12/09/2022: Moderate protein-calorie malnutrition

## 2022-12-06 NOTE — PROGRESS NOTE ADULT - SUBJECTIVE AND OBJECTIVE BOX
[facility]  INFECTIOUS DISEASES   [address] [place] [address]  Tel: [phone]     Fax: [phone]  ======================================================  Velez MD Jonathan Garellek, DO Letitia Briscoe, ZACH   ======================================================    PRISCILLA MARLA CAMPOS  [id number]      Follow Up:  osteomyelitis     Interval History: patient seen and examined. doing well and no complaints. awaiting surgery for toe amputation    ROS:    [ ] Unobtainable because:  [X ] All other systems negative except as noted        Allergies  No Known Allergies        ANTIMICROBIALS:  piperacillin/tazobactam IVPB.. 3.375 every 12 hours      OTHER MEDS:  acetaminophen     Tablet .. 650 milliGRAM(s) Oral every 6 hours PRN  aluminum hydroxide/magnesium hydroxide/simethicone Suspension 30 milliLiter(s) Oral every 4 hours PRN  amLODIPine   Tablet 10 milliGRAM(s) Oral daily  bisacodyl 5 milliGRAM(s) Oral every 12 hours PRN  buMETAnide 1 milliGRAM(s) Oral daily  cadexomer iodine 0.9% Gel 1 Application(s) Topical daily  heparin   Injectable 5000 Unit(s) SubCutaneous every 8 hours  hydrALAZINE 25 milliGRAM(s) Oral every 8 hours  melatonin 3 milliGRAM(s) Oral at bedtime PRN  morphine  - Injectable 2 milliGRAM(s) IV Push once  ondansetron Injectable 4 milliGRAM(s) IV Push every 8 hours PRN  pantoprazole    Tablet 40 milliGRAM(s) Oral before breakfast  predniSONE   Tablet 40 milliGRAM(s) Oral daily  senna 2 Tablet(s) Oral at bedtime  traMADol 50 milliGRAM(s) Oral every 6 hours PRN      Physical Exam:  Vital Signs Last 24 Hrs  T(C): 36.6 (06 Dec 2022 13:07), Max: 36.9 (06 Dec 2022 05:05)  T(F): 97.8 (06 Dec 2022 13:07), Max: 98.4 (06 Dec 2022 05:05)  HR: 84 (06 Dec 2022 13:07) (65 - 84)  BP: 146/76 (06 Dec 2022 13:07) (124/73 - 160/80)  BP(mean): --  RR: 18 (06 Dec 2022 13:07) (17 - 18)  SpO2: 100% (06 Dec 2022 13:07) (97% - 100%)    Parameters below as of 06 Dec 2022 11:26  Patient On (Oxygen Delivery Method): room air      General:    NAD,  non toxic  Head: atraumatic, normocephalic  Eye: normal sclera and conjunctiva  ENT:    no oral lesions, neck supple  Cardio:     regular S1, S2,  no murmur, Respiratory:    clear b/l,    no wheezing  abd:     soft,   BS +,   no tenderness  :   no CVAT,  no suprapubic tenderness,   no  arellano  Musculoskeletal:   no joint swelling,   no edema, gouty changes in fingers, toes and elbows   vascular: + right chest wall permacath c/d/i , +PIV   Skin:    no rash, foot dressed   Neurologic:     no focal deficit  psych: normal affect    WBC Count: 6.97 K/uL (12-06 @ 07:59)  WBC Count: 6.98 K/uL (12-05 @ 07:00)  WBC Count: 5.17 K/uL (12-03 @ 02:50)  WBC Count: 5.22 K/uL (12-02 @ 19:54)                            12.0   6.97  )-----------( 275      ( 06 Dec 2022 07:59 )             37.3       12-06    139  |  102  |  53<H>  ----------------------------<  89  3.7   |  27  |  4.48<H>    Ca    9.6      06 Dec 2022 07:59    TPro  6.7  /  Alb  2.8<L>  /  TBili  0.3  /  DBili  x   /  AST  24  /  ALT  33  /  AlkPhos  63  12-05      Creatinine Trend: 4.48<--, 5.58<--, 3.58<--, 3.40<--      MICROBIOLOGY:  v  .Abscess right foot  12-03-22   Numerous Proteus mirabilis  Few Staphylococcus epidermidis "Susceptibilities not performed"  Numerous Bacteroides fragilis "Susceptibilities not performed"  --  Proteus mirabilis    C-Reactive Protein, Serum: 66 (12-03)    SARS-CoV-2 Result: NotDetec (12-05-22 @ 15:00)  SARS-CoV-2 Result: NotDetec (12-03-22 @ 07:50)    RADIOLOGY:

## 2022-12-06 NOTE — DISCHARGE NOTE PROVIDER - HOSPITAL COURSE
68 yo M with ESRD, RA and HTN, presenting due to nonhealing R 4th toe wound. He has joint issues in setting of RA and was previously treated for foot infection at wound care which healed 1 yr ago. this time infection has gotten worse over the past 3 weeks causing severe pain and prompting ED visit. Podiatry saw pt in ED and recommended vanco/zosyn for suspected osteomyelitis. vitals stable. labs significant for lactate 3.7 improving to 1.7 after IVF. Sed rate 42.     Acute right 4th toe osteomyelitis   sepsis -POA- now ruled out, vitals stable   -cont current antibiotics and pain control.  -s/p right foot partial fourth ray resection closed (12/6/22)  - ID following and will help with antibiotic course.   -Right foot wound growing Proteus mirabilis, Staph epidermidis, Bacteroides fragilis (final).  -Final Diagnosis  Fourth toe, right, resection:  - Skin and soft tissue : Ulcer, granulation tissue and gouty tophi  - Bone : Gouty tophi and chronic osteomyelitis, focal  - Bone resection margin : Negative for gouty tophi and osteomyelitis  Moderate protein-calorie malnutrition.- Diet Regular-For patients receiving Renal Replacement - No Protein Restr No Conc K No Conc Phos Low Sodium (RENAL)  RA  -on prednisone   HTN -controlled. cont current meds. Monitor.   ESRD -Continue hemodialysis and keep net negative fluid balance   Legally blind bilaterally.

## 2022-12-06 NOTE — DISCHARGE NOTE PROVIDER - NSDCCPCAREPLAN_GEN_ALL_CORE_FT
PRINCIPAL DISCHARGE DIAGNOSIS  Diagnosis: Wound infection  Assessment and Plan of Treatment: 68 yo M with ESRD, RA and HTN, presenting due to nonhealing R 4th toe wound. He has joint issues in setting of RA and was previously treated for foot infection at wound care which healed 1 yr ago. this time infection has gotten worse over the past 3 weeks causing severe pain and prompting ED visit. Podiatry saw pt in ED and recommended vanco/zosyn for suspected osteomyelitis. vitals stable. labs significant for lactate 3.7 improving to 1.7 after IVF. Sed rate 42.   Acute right 4th toe osteomyelitis   sepsis -POA- now ruled out, vitals stable   -cont current antibiotics and pain control.  -s/p right foot partial fourth ray resection closed (12/6/22)  - ID following and will help with antibiotic course.   -Right foot wound growing Proteus mirabilis, Staph epidermidis, Bacteroides fragilis (final).  -Final Diagnosis  Fourth toe, right, resection:  - Skin and soft tissue : Ulcer, granulation tissue and gouty tophi  - Bone : Gouty tophi and chronic osteomyelitis, focal  - Bone resection margin : Negative for gouty tophi and osteomyelitis  Moderate protein-calorie malnutrition.- Diet Regular-For patients receiving Renal Replacement - No Protein Restr No Conc K No Conc Phos Low Sodium (RENAL)  RA  -on prednisone   HTN -controlled. cont current meds. Monitor.   ESRD -Continue hemodialysis and keep net negative fluid balance   Legally blind bilaterally.

## 2022-12-06 NOTE — PROGRESS NOTE ADULT - SUBJECTIVE AND OBJECTIVE BOX
CHIEF COMPLAINT: Follow up of right 4th toe osteomyelitis  Pain on palpation  no fever  no n/v/d/abd pain/sob/cp  NPO for toe amputation today.     PHYSICAL EXAM:  GENERAL: Moderately built, no acute distress   CHEST/LUNG: Clear to ausculation bilaterally, no wheezing, no crackles   HEART: Regular rate and rhythm; No murmurs, rubs  ABDOMEN: Soft, Nontender, Nondistended; Bowel sounds present  EXTREMITIES:  Moving all four extremities spontaneously, No clubbing, cyanosis, or edema. dressing right foot.   NERVOUS SYSTEM:  Grossly non focal.  Psychiatry: AAO x 3, mood is appropriate       OBJECTIVE DATA:     Vital Signs Last 24 Hrs  T(C): 36.6 (06 Dec 2022 11:26), Max: 36.9 (06 Dec 2022 05:05)  T(F): 97.8 (06 Dec 2022 11:26), Max: 98.4 (06 Dec 2022 05:05)  HR: 84 (06 Dec 2022 11:26) (60 - 84)  BP: 146/76 (06 Dec 2022 11:26) (122/71 - 160/80)  BP(mean): --  RR: 18 (06 Dec 2022 11:26) (16 - 18)  SpO2: 100% (06 Dec 2022 11:26) (97% - 100%)    Parameters below as of 06 Dec 2022 11:26  Patient On (Oxygen Delivery Method): room air               Daily     Daily   LABS:                        12.0   6.97  )-----------( 275      ( 06 Dec 2022 07:59 )             37.3             12-06    139  |  102  |  53<H>  ----------------------------<  89  3.7   |  27  |  4.48<H>    Ca    9.6      06 Dec 2022 07:59    TPro  6.7  /  Alb  2.8<L>  /  TBili  0.3  /  DBili  x   /  AST  24  /  ALT  33  /  AlkPhos  63  12-05              PT/INR - ( 06 Dec 2022 07:59 )   PT: 12.4 sec;   INR: 1.04 ratio         PTT - ( 06 Dec 2022 07:59 )  PTT:38.4 sec         CAPILLARY BLOOD GLUCOSE      POCT Blood Glucose.: 115 mg/dL (06 Dec 2022 11:17)      Culture - Abscess with Gram Stain (collected 12-03)  Source: .Abscess right foot  Final Report (12-05):    Numerous Proteus mirabilis    Few Staphylococcus epidermidis "Susceptibilities not performed"    Numerous Bacteroides fragilis "Susceptibilities not performed"  Organism: Proteus mirabilis (12-05)  Organism: Proteus mirabilis (12-05)    Sensitivities:      -  Amikacin: S <=16      -  Amoxicillin/Clavulanic Acid: S <=8/4      -  Ampicillin: S <=8 These ampicillin results predict results for amoxicillin      -  Ampicillin/Sulbactam: S <=4/2 Enterobacter, Klebsiella aerogenes, Citrobacter, and Serratia may develop resistance during prolonged therapy (3-4 days)      -  Aztreonam: S <=4      -  Cefazolin: I 4 Enterobacter, Klebsiella aerogenes, Citrobacter, and Serratia may develop resistance during prolonged therapy (3-4 days)      -  Cefepime: S <=2      -  Cefoxitin: S <=8      -  Ceftriaxone: S <=1 Enterobacter, Klebsiella aerogenes, Citrobacter, and Serratia may develop resistance during prolonged therapy      -  Ciprofloxacin: S <=0.25      -  Ertapenem: S <=0.5      -  Gentamicin: S <=2      -  Levofloxacin: S <=0.5      -  Meropenem: S <=1      -  Piperacillin/Tazobactam: S <=8      -  Tobramycin: S <=2      -  Trimethoprim/Sulfamethoxazole: S <=0.5/9.5      Method Type: [facility]          MEDICATIONS  (STANDING):  amLODIPine   Tablet 10 milliGRAM(s) Oral daily  buMETAnide 1 milliGRAM(s) Oral daily  cadexomer iodine 0.9% Gel 1 Application(s) Topical daily  heparin   Injectable 5000 Unit(s) SubCutaneous every 8 hours  hydrALAZINE 25 milliGRAM(s) Oral every 8 hours  morphine  - Injectable 2 milliGRAM(s) IV Push once  pantoprazole    Tablet 40 milliGRAM(s) Oral before breakfast  piperacillin/tazobactam IVPB.. 3.375 Gram(s) IV Intermittent every 12 hours  predniSONE   Tablet 40 milliGRAM(s) Oral daily  senna 2 Tablet(s) Oral at bedtime    MEDICATIONS  (PRN):  acetaminophen     Tablet .. 650 milliGRAM(s) Oral every 6 hours PRN Temp greater or equal to 38C (100.4F), Mild Pain (1 - 3)  aluminum hydroxide/magnesium hydroxide/simethicone Suspension 30 milliLiter(s) Oral every 4 hours PRN Dyspepsia  bisacodyl 5 milliGRAM(s) Oral every 12 hours PRN Constipation  melatonin 3 milliGRAM(s) Oral at bedtime PRN Insomnia  ondansetron Injectable 4 milliGRAM(s) IV Push every 8 hours PRN Nausea and/or Vomiting  traMADol 50 milliGRAM(s) Oral every 6 hours PRN Severe Pain (7 - 10)

## 2022-12-06 NOTE — PROGRESS NOTE ADULT - ASSESSMENT
a 66 yo M with ESRD, RA and HTN, presenting due to nonhealing R 4th toe wound. He has joint issues in setting of RA and was previously treated for foot infection at wound care which healed 1 yr ago. this time infection has gotten worse over the past 3 weeks causing severe pain and prompting ED visit. Podiatry saw pt in ED and recommended vanco/zosyn for suspected osteomyelitis. vitals stable. labs significant for lactate 3.7 improving to 1.7 after IVF. Sed rate 42.     Acute right 4th toe osteomyelitis   sepsis -POA- now ruled out, vitals stable   -cont current antibiotics and pain control. npo for surgery today. follow post op recs. restart diet afterwards if ok with podiatrist. ID following and will help with antibiotic course.     RA    -on prednisone     HTN   -controlled. cont current meds. Monitor.     ESRD   -HD as per Renal    Legally blind bilaterally.       dvtpp-heparin sc on hold for surgery.   Full code

## 2022-12-06 NOTE — BRIEF OPERATIVE NOTE - NSICDXBRIEFPREOP_GEN_ALL_CORE_FT
PRE-OP DIAGNOSIS:  Gout of right foot 06-Dec-2022 15:42:29  Yara Hennessy  Osteomyelitis of fourth toe of right foot 06-Dec-2022 15:44:11  Yara Hennessy

## 2022-12-06 NOTE — BRIEF OPERATIVE NOTE - COMMENTS
low concern for residual infection, low concern for viability. Pod plan to follow up OR data. patient can be discharge as soon as Friday if all stable.

## 2022-12-06 NOTE — PROGRESS NOTE ADULT - ASSESSMENT
68 yo M with ESRD, RA and HTN, presenting due to nonhealing R 4th toe wound. He has joint issues in setting of RA and was previously treated for foot infection at wound care which healed 1 yr ago. this time infection has gotten worse over the past 3 weeks causing severe pain and prompting ED visit.   Sed rate 42.   xray: Right fourth digit with destruction of the PIP joint with dislocation, suspicious for septic arthritis/osteomyelitis.  patient clearly has osteomyelitis of his toe and MRI likely does not need to be done   discussed with patient the risks of not amputating including long term antibiotics, spreading infection and higher amputation   risks of surgery per podiatry   Vascular Arterial lower extremity bilateral 10.29.21 No evidence of arterial occlusion in the bilateral lower extremities.  Elevated velocities within the bilateral lower extremity arteries, as above, compatible with segmental stenoses.     12/6: amputation today, continue IV antibiotics, continue regular sessions of hemodialysis     Plan:  continue (Zosyn) Piperacillin/tazobactam 3.375 grams every 12 hours  follow all cultures   amputation of 4th toe tomorrow as add on case   please send clean bone margins and clean bone cultures   Continue hemodialysis and keep net negative fluid balance     Discussed with medical team    Immanuel Marie, DO  Infectious Disease Attending  Reachable via Microsoft Teams or ID office: [phone]  After 5pm/weekends please call [phone] for all inquiries and new consults

## 2022-12-06 NOTE — PROGRESS NOTE ADULT - SUBJECTIVE AND OBJECTIVE BOX
Patient is a 67y old  Male who presents with a chief complaint of     INTERVAL HPI/OVERNIGHT EVENTS:   Pt is scheduled for right foot partial fourth ray resection with Dr. Piedra as an add on case today Tuesday 12/6. Patient is aware of procedure and is NPO since midnight.    MEDICATIONS  (STANDING):  amLODIPine   Tablet 10 milliGRAM(s) Oral daily  buMETAnide 1 milliGRAM(s) Oral daily  cadexomer iodine 0.9% Gel 1 Application(s) Topical daily  heparin   Injectable 5000 Unit(s) SubCutaneous every 8 hours  hydrALAZINE 25 milliGRAM(s) Oral every 8 hours  morphine  - Injectable 2 milliGRAM(s) IV Push once  pantoprazole    Tablet 40 milliGRAM(s) Oral before breakfast  piperacillin/tazobactam IVPB.. 3.375 Gram(s) IV Intermittent every 12 hours  predniSONE   Tablet 40 milliGRAM(s) Oral daily  senna 2 Tablet(s) Oral at bedtime    MEDICATIONS  (PRN):  acetaminophen     Tablet .. 650 milliGRAM(s) Oral every 6 hours PRN Temp greater or equal to 38C (100.4F), Mild Pain (1 - 3)  aluminum hydroxide/magnesium hydroxide/simethicone Suspension 30 milliLiter(s) Oral every 4 hours PRN Dyspepsia  bisacodyl 5 milliGRAM(s) Oral every 12 hours PRN Constipation  melatonin 3 milliGRAM(s) Oral at bedtime PRN Insomnia  ondansetron Injectable 4 milliGRAM(s) IV Push every 8 hours PRN Nausea and/or Vomiting  traMADol 50 milliGRAM(s) Oral every 6 hours PRN Severe Pain (7 - 10)      Allergies    No Known Allergies    Intolerances        Vital Signs Last 24 Hrs  T(C): 36.9 (06 Dec 2022 05:05), Max: 36.9 (06 Dec 2022 05:05)  T(F): 98.4 (06 Dec 2022 05:05), Max: 98.4 (06 Dec 2022 05:05)  HR: 65 (06 Dec 2022 05:05) (60 - 80)  BP: 124/73 (06 Dec 2022 05:05) (122/71 - 160/80)  BP(mean): --  RR: 18 (06 Dec 2022 05:05) (16 - 18)  SpO2: 100% (06 Dec 2022 05:05) (97% - 100%)    Parameters below as of 06 Dec 2022 05:05  Patient On (Oxygen Delivery Method): room air        LABS:                        11.5   6.98  )-----------( 285      ( 05 Dec 2022 07:00 )             35.8     12-05    138  |  107  |  74<H>  ----------------------------<  86  3.7   |  20<L>  |  5.58<H>    Ca    9.5      05 Dec 2022 07:00    TPro  6.7  /  Alb  2.8<L>  /  TBili  0.3  /  DBili  x   /  AST  24  /  ALT  33  /  AlkPhos  63  12-05        CAPILLARY BLOOD GLUCOSE      POCT Blood Glucose.: 93 mg/dL (06 Dec 2022 07:46)      RADIOLOGY & ADDITIONAL TESTS:

## 2022-12-06 NOTE — PROGRESS NOTE ADULT - ASSESSMENT
Plan:   Pt is scheduled for right foot partial fourth ray resection with Dr. Piedra as an add on case today Tuesday 12/6. Patient is aware of procedure and is NPO since midnight.  CXR on sunrise.  EKG on sunrise.  Medical/Cardiac clearance since 12/5 and documented in chart.  Consent signed and in chart.  Procedure was explained to patient in detail. All alternatives, risks and complications were discussed. All questions answered.

## 2022-12-07 LAB
ALBUMIN SERPL ELPH-MCNC: 3.3 G/DL — SIGNIFICANT CHANGE UP (ref 3.3–5)
ALP SERPL-CCNC: 86 U/L — SIGNIFICANT CHANGE UP (ref 40–120)
ALT FLD-CCNC: 114 U/L — HIGH (ref 12–78)
ANION GAP SERPL CALC-SCNC: 16 MMOL/L — SIGNIFICANT CHANGE UP (ref 5–17)
AST SERPL-CCNC: 55 U/L — HIGH (ref 15–37)
BILIRUB SERPL-MCNC: 0.6 MG/DL — SIGNIFICANT CHANGE UP (ref 0.2–1.2)
BUN SERPL-MCNC: 65 MG/DL — HIGH (ref 7–23)
CALCIUM SERPL-MCNC: 9.8 MG/DL — SIGNIFICANT CHANGE UP (ref 8.5–10.1)
CHLORIDE SERPL-SCNC: 102 MMOL/L — SIGNIFICANT CHANGE UP (ref 96–108)
CO2 SERPL-SCNC: 19 MMOL/L — LOW (ref 22–31)
CREAT SERPL-MCNC: 5.74 MG/DL — HIGH (ref 0.5–1.3)
EGFR: 10 ML/MIN/1.73M2 — LOW
GLUCOSE SERPL-MCNC: 96 MG/DL — SIGNIFICANT CHANGE UP (ref 70–99)
HCT VFR BLD CALC: 39.2 % — SIGNIFICANT CHANGE UP (ref 39–50)
HGB BLD-MCNC: 12.6 G/DL — LOW (ref 13–17)
MCHC RBC-ENTMCNC: 32.1 G/DL — SIGNIFICANT CHANGE UP (ref 32–36)
MCHC RBC-ENTMCNC: 32.6 PG — SIGNIFICANT CHANGE UP (ref 27–34)
MCV RBC AUTO: 101.3 FL — HIGH (ref 80–100)
NRBC # BLD: 0 /100 WBCS — SIGNIFICANT CHANGE UP (ref 0–0)
PLATELET # BLD AUTO: 326 K/UL — SIGNIFICANT CHANGE UP (ref 150–400)
POTASSIUM SERPL-MCNC: 3.9 MMOL/L — SIGNIFICANT CHANGE UP (ref 3.5–5.3)
POTASSIUM SERPL-SCNC: 3.9 MMOL/L — SIGNIFICANT CHANGE UP (ref 3.5–5.3)
PROT SERPL-MCNC: 7.6 GM/DL — SIGNIFICANT CHANGE UP (ref 6–8.3)
RBC # BLD: 3.87 M/UL — LOW (ref 4.2–5.8)
RBC # FLD: 14.8 % — HIGH (ref 10.3–14.5)
SODIUM SERPL-SCNC: 137 MMOL/L — SIGNIFICANT CHANGE UP (ref 135–145)
WBC # BLD: 9.15 K/UL — SIGNIFICANT CHANGE UP (ref 3.8–10.5)
WBC # FLD AUTO: 9.15 K/UL — SIGNIFICANT CHANGE UP (ref 3.8–10.5)

## 2022-12-07 PROCEDURE — 99233 SBSQ HOSP IP/OBS HIGH 50: CPT

## 2022-12-07 RX ORDER — MORPHINE SULFATE 50 MG/1
1 CAPSULE, EXTENDED RELEASE ORAL ONCE
Refills: 0 | Status: DISCONTINUED | OUTPATIENT
Start: 2022-12-07 | End: 2022-12-07

## 2022-12-07 RX ADMIN — MORPHINE SULFATE 1 MILLIGRAM(S): 50 CAPSULE, EXTENDED RELEASE ORAL at 06:23

## 2022-12-07 RX ADMIN — Medication 25 MILLIGRAM(S): at 05:34

## 2022-12-07 RX ADMIN — AMLODIPINE BESYLATE 10 MILLIGRAM(S): 2.5 TABLET ORAL at 05:34

## 2022-12-07 RX ADMIN — Medication 25 MILLIGRAM(S): at 21:38

## 2022-12-07 RX ADMIN — TRAMADOL HYDROCHLORIDE 50 MILLIGRAM(S): 50 TABLET ORAL at 22:40

## 2022-12-07 RX ADMIN — Medication 40 MILLIGRAM(S): at 05:34

## 2022-12-07 RX ADMIN — TRAMADOL HYDROCHLORIDE 50 MILLIGRAM(S): 50 TABLET ORAL at 18:37

## 2022-12-07 RX ADMIN — PANTOPRAZOLE SODIUM 40 MILLIGRAM(S): 20 TABLET, DELAYED RELEASE ORAL at 07:42

## 2022-12-07 RX ADMIN — Medication 25 MILLIGRAM(S): at 14:07

## 2022-12-07 RX ADMIN — HEPARIN SODIUM 5000 UNIT(S): 5000 INJECTION INTRAVENOUS; SUBCUTANEOUS at 21:40

## 2022-12-07 RX ADMIN — MORPHINE SULFATE 1 MILLIGRAM(S): 50 CAPSULE, EXTENDED RELEASE ORAL at 06:40

## 2022-12-07 RX ADMIN — PIPERACILLIN AND TAZOBACTAM 25 GRAM(S): 4; .5 INJECTION, POWDER, LYOPHILIZED, FOR SOLUTION INTRAVENOUS at 05:35

## 2022-12-07 RX ADMIN — BUMETANIDE 1 MILLIGRAM(S): 0.25 INJECTION INTRAMUSCULAR; INTRAVENOUS at 05:34

## 2022-12-07 RX ADMIN — HEPARIN SODIUM 5000 UNIT(S): 5000 INJECTION INTRAVENOUS; SUBCUTANEOUS at 05:35

## 2022-12-07 RX ADMIN — TRAMADOL HYDROCHLORIDE 50 MILLIGRAM(S): 50 TABLET ORAL at 21:40

## 2022-12-07 RX ADMIN — TRAMADOL HYDROCHLORIDE 50 MILLIGRAM(S): 50 TABLET ORAL at 03:07

## 2022-12-07 RX ADMIN — TRAMADOL HYDROCHLORIDE 50 MILLIGRAM(S): 50 TABLET ORAL at 14:05

## 2022-12-07 RX ADMIN — TRAMADOL HYDROCHLORIDE 50 MILLIGRAM(S): 50 TABLET ORAL at 02:07

## 2022-12-07 RX ADMIN — HEPARIN SODIUM 5000 UNIT(S): 5000 INJECTION INTRAVENOUS; SUBCUTANEOUS at 14:06

## 2022-12-07 RX ADMIN — PIPERACILLIN AND TAZOBACTAM 25 GRAM(S): 4; .5 INJECTION, POWDER, LYOPHILIZED, FOR SOLUTION INTRAVENOUS at 17:28

## 2022-12-07 NOTE — PROGRESS NOTE ADULT - ASSESSMENT
a 66 yo M with ESRD, RA and HTN, presenting due to nonhealing R 4th toe wound. He has joint issues in setting of RA and was previously treated for foot infection at wound care which healed 1 yr ago. this time infection has gotten worse over the past 3 weeks causing severe pain and prompting ED visit. Podiatry saw pt in ED and recommended vanco/zosyn for suspected osteomyelitis. vitals stable. labs significant for lactate 3.7 improving to 1.7 after IVF. Sed rate 42.     Acute right 4th toe osteomyelitis   sepsis -POA- now ruled out, vitals stable   -cont current antibiotics and pain control.  -s/p right foot partial fourth ray resection closed (12/6/22)  - ID following and will help with antibiotic course.   -Right foot wound growing Proteus mirabilis, Staph epidermidis, Bacteroides fragilis (final).  RA  -on prednisone   HTN -controlled. cont current meds. Monitor.   ESRD -Continue hemodialysis and keep net negative fluid balance   Legally blind bilaterally.    dvtpp-heparin sc   Full code

## 2022-12-07 NOTE — PROGRESS NOTE ADULT - SUBJECTIVE AND OBJECTIVE BOX
Patient is a 67y old  Male who presents with a chief complaint of      INTERVAL HPI/OVERNIGHT EVENTS:  Patient seen and evaluated at bedside.  Pt is resting comfortable in NAD. Denies N/V/F/C.    Allergies    No Known Allergies    Intolerances        Vital Signs Last 24 Hrs  T(C): 36.8 (07 Dec 2022 09:10), Max: 36.9 (06 Dec 2022 15:45)  T(F): 98.2 (07 Dec 2022 09:10), Max: 98.5 (06 Dec 2022 15:45)  HR: 71 (07 Dec 2022 09:10) (63 - 84)  BP: 145/74 (07 Dec 2022 09:10) (123/77 - 165/85)  BP(mean): --  RR: 18 (07 Dec 2022 09:10) (12 - 21)  SpO2: 100% (07 Dec 2022 09:10) (93% - 100%)    Parameters below as of 07 Dec 2022 09:10  Patient On (Oxygen Delivery Method): room air        LABS:                        12.6   9.15  )-----------( 326      ( 07 Dec 2022 07:50 )             39.2     12-07    137  |  102  |  65<H>  ----------------------------<  96  3.9   |  19<L>  |  5.74<H>    Ca    9.8      07 Dec 2022 07:50    TPro  7.6  /  Alb  3.3  /  TBili  0.6  /  DBili  x   /  AST  55<H>  /  ALT  114<H>  /  AlkPhos  86  12-07    PT/INR - ( 06 Dec 2022 07:59 )   PT: 12.4 sec;   INR: 1.04 ratio         PTT - ( 06 Dec 2022 07:59 )  PTT:38.4 sec    CAPILLARY BLOOD GLUCOSE      POCT Blood Glucose.: 101 mg/dL (06 Dec 2022 21:35)  POCT Blood Glucose.: 96 mg/dL (06 Dec 2022 16:20)  POCT Blood Glucose.: 115 mg/dL (06 Dec 2022 11:17)      Lower Extremity Physical Exam:  Vascular: DP/PT 2/4, B/L, CFT <3 seconds B/L, Temperature gradient warm to cool B/L.   Neuro: Epicritic sensation intact to the level of forefoot B/L.  Musculoskeletal/Ortho: Progressive Rheumatoid arthritis B/L with ulnar deviation of the lesser metatarsals. HAV B/L.   Skin: s/p right foot partial fourth ray resection closed (DOS 12/6/22), sutures intact, skin well coapted, drain intact, moderate serosanguinous drainage noted on bandages, no hematoma noted, scant sanguinous drainage expressed, no fluctuance, flaps warm and viable. Left foot no wounds, no clinical signs of infection.     RADIOLOGY & ADDITIONAL TESTS:

## 2022-12-07 NOTE — PROGRESS NOTE ADULT - SUBJECTIVE AND OBJECTIVE BOX
[facility] NEPHROLOGY SERVICES, [facility]  NEPHROLOGY AND HYPERTENSION  [address]  [address]  [place] [address]  [phone]    MD ELLI WHITTAKER MD ANDREY GONCHARUK, MD MADHU KORRAPATI, MD YELENA ROSENBERG, MD BINNY KOSHY, MD CHRISTOPHER CAPUTO, MD JANENE MATTA MD          Patient events noted  No distress      MEDICATIONS  (STANDING):  amLODIPine   Tablet 10 milliGRAM(s) Oral daily  buMETAnide 1 milliGRAM(s) Oral daily  cadexomer iodine 0.9% Gel 1 Application(s) Topical daily  heparin   Injectable 5000 Unit(s) SubCutaneous every 8 hours  hydrALAZINE 25 milliGRAM(s) Oral every 8 hours  pantoprazole    Tablet 40 milliGRAM(s) Oral before breakfast  piperacillin/tazobactam IVPB.. 3.375 Gram(s) IV Intermittent every 12 hours  predniSONE   Tablet 40 milliGRAM(s) Oral daily  senna 2 Tablet(s) Oral at bedtime    MEDICATIONS  (PRN):  acetaminophen     Tablet .. 650 milliGRAM(s) Oral every 6 hours PRN Temp greater or equal to 38C (100.4F), Mild Pain (1 - 3)  acetaminophen     Tablet .. 650 milliGRAM(s) Oral every 6 hours PRN Mild Pain (1 - 3)  aluminum hydroxide/magnesium hydroxide/simethicone Suspension 30 milliLiter(s) Oral every 4 hours PRN Dyspepsia  bisacodyl 5 milliGRAM(s) Oral every 12 hours PRN Constipation  melatonin 3 milliGRAM(s) Oral at bedtime PRN Insomnia  ondansetron Injectable 4 milliGRAM(s) IV Push every 8 hours PRN Nausea and/or Vomiting  traMADol 50 milliGRAM(s) Oral every 6 hours PRN Severe Pain (7 - 10)      12-06-22 @ 07:01  -  12-07-22 @ 07:00  --------------------------------------------------------  IN: 340 mL / OUT: 0 mL / NET: 340 mL    12-07-22 @ 07:01  -  12-07-22 @ 21:26  --------------------------------------------------------  IN: 720 mL / OUT: 2000 mL / NET: -1280 mL      PHYSICAL EXAM:      T(C): 36.8 (12-07-22 @ 17:28), Max: 36.9 (12-07-22 @ 12:40)  HR: 73 (12-07-22 @ 17:28) (71 - 88)  BP: 129/80 (12-07-22 @ 17:28) (129/80 - 156/86)  RR: 18 (12-07-22 @ 17:28) (16 - 20)  SpO2: 98% (12-07-22 @ 17:28) (95% - 100%)  Wt(kg): --  Lungs clear  Heart S1S2  Abd soft NT ND  Extremities:   tr edema                                    12.6   9.15  )-----------( 326      ( 07 Dec 2022 07:50 )             39.2     12-07    137  |  102  |  65<H>  ----------------------------<  96  3.9   |  19<L>  |  5.74<H>    Ca    9.8      07 Dec 2022 07:50    TPro  7.6  /  Alb  3.3  /  TBili  0.6  /  DBili  x   /  AST  55<H>  /  ALT  114<H>  /  AlkPhos  86  12-07      LIVER FUNCTIONS - ( 07 Dec 2022 07:50 )  Alb: 3.3 g/dL / Pro: 7.6 gm/dL / ALK PHOS: 86 U/L / ALT: 114 U/L / AST: 55 U/L / GGT: x           Creatinine Trend: 5.74<--, 4.48<--, 5.58<--, 3.58<--, 3.40<--      Assessment   ESRD; maintenance    Plan:  HD for today;   UF as tolerated   Will follow     Marcel Carmona MD

## 2022-12-07 NOTE — PROGRESS NOTE ADULT - ASSESSMENT
67M s/p right foot partial fourth ray resection closed (12/6/22)  - Pt was seen and evaluated.   - Afebrile, no leukocytosis.  - s/p right foot partial fourth ray resection closed (DOS 12/6/22), sutures intact, skin well coapted, drain intact, moderate serosanguinous drainage noted on bandages, no hematoma noted, scant sanguinous drainage expressed, no fluctuance, flaps warm and viable. Left foot no wounds, no clinical signs of infection.   - Due to scant sanguinous drainage expressed, the penrose drain was removed and a steri-strip was applied to the area to allow for good skin coaptation.   - ID recs, appreciated.  - Right foot fourth digit pathology, pending.   - Right foot wound growing Proteus mirabilis, Staph epidermidis, Bacteroides fragilis (prelim).  - Pod Plan: Pt is stable for discharge on Friday 12/9 pending viability and stability.  - Followup information and wound care instructions can be found in the discharge provider note.  - Discussed with attending. 67M s/p right foot partial fourth ray resection closed (12/6/22)  - Pt was seen and evaluated.   - Afebrile, no leukocytosis.  - s/p right foot partial fourth ray resection closed (DOS 12/6/22), sutures intact, skin well coapted, drain intact, moderate serosanguinous drainage noted on bandages, no hematoma noted, scant sanguinous drainage expressed, no fluctuance, flaps warm and viable. Left foot no wounds, no clinical signs of infection.   - Due to scant sanguinous drainage expressed, the penrose drain was removed and a steri-strip was applied to the area to allow for good skin coaptation.   - ID recs, appreciated.  - Right foot fourth digit pathology, pending.   - Right foot wound growing Proteus mirabilis, Staph epidermidis, Bacteroides fragilis (final).  - Pod Plan: Pt is stable for discharge on Friday 12/9 pending viability and stability.  - Followup information and wound care instructions can be found in the discharge provider note.  - Discussed with attending.

## 2022-12-07 NOTE — PROGRESS NOTE ADULT - SUBJECTIVE AND OBJECTIVE BOX
Patient is a 67y old  Male who presents with a chief complaint of     OVERNIGHT EVENTS:  Patients seen and examined at bedside this morning. No acute events overnight.    REVIEW OF SYSTEMS: denies chest pain/SOB, diaphoresis, no F/C, cough, dizziness, headache, blurry vision, nausea, vomiting, abdominal pain. All others review of systems negative     MEDICATIONS  (STANDING):  amLODIPine   Tablet 10 milliGRAM(s) Oral daily  buMETAnide 1 milliGRAM(s) Oral daily  cadexomer iodine 0.9% Gel 1 Application(s) Topical daily  heparin   Injectable 5000 Unit(s) SubCutaneous every 8 hours  hydrALAZINE 25 milliGRAM(s) Oral every 8 hours  pantoprazole    Tablet 40 milliGRAM(s) Oral before breakfast  piperacillin/tazobactam IVPB.. 3.375 Gram(s) IV Intermittent every 12 hours  predniSONE   Tablet 40 milliGRAM(s) Oral daily  senna 2 Tablet(s) Oral at bedtime    MEDICATIONS  (PRN):  acetaminophen     Tablet .. 650 milliGRAM(s) Oral every 6 hours PRN Temp greater or equal to 38C (100.4F), Mild Pain (1 - 3)  acetaminophen     Tablet .. 650 milliGRAM(s) Oral every 6 hours PRN Mild Pain (1 - 3)  aluminum hydroxide/magnesium hydroxide/simethicone Suspension 30 milliLiter(s) Oral every 4 hours PRN Dyspepsia  bisacodyl 5 milliGRAM(s) Oral every 12 hours PRN Constipation  melatonin 3 milliGRAM(s) Oral at bedtime PRN Insomnia  ondansetron Injectable 4 milliGRAM(s) IV Push every 8 hours PRN Nausea and/or Vomiting  traMADol 50 milliGRAM(s) Oral every 6 hours PRN Severe Pain (7 - 10)      Allergies    No Known Allergies    Intolerances        T(F): 98.3 (12-07-22 @ 17:28), Max: 98.4 (12-07-22 @ 12:40)  HR: 73 (12-07-22 @ 17:28) (71 - 88)  BP: 129/80 (12-07-22 @ 17:28) (129/80 - 156/86)  RR: 18 (12-07-22 @ 17:28) (16 - 20)  SpO2: 98% (12-07-22 @ 17:28) (95% - 100%)  Wt(kg): --    PHYSICAL EXAM:  GENERAL: NAD  HEAD:  Atraumatic, Normocephalic  EYES: PERRLA, conjunctiva and sclera clear  ENMT: Moist mucous membranes  NECK: Supple, No JVD, Normal thyroid  NERVOUS SYSTEM:  Alert & Awake  CHEST/LUNG: Clear to percussion bilaterally;   HEART: Regular rate and rhythm;   ABDOMEN: Soft, Nontender, Nondistended; Bowel sounds present  EXTREMITIES:  no edema BL LE  SKIN: moist    LABS:                        12.6   9.15  )-----------( 326      ( 07 Dec 2022 07:50 )             39.2     12-07    137  |  102  |  65<H>  ----------------------------<  96  3.9   |  19<L>  |  5.74<H>    Ca    9.8      07 Dec 2022 07:50    TPro  7.6  /  Alb  3.3  /  TBili  0.6  /  DBili  x   /  AST  55<H>  /  ALT  114<H>  /  AlkPhos  86  12-07    PT/INR - ( 06 Dec 2022 07:59 )   PT: 12.4 sec;   INR: 1.04 ratio         PTT - ( 06 Dec 2022 07:59 )  PTT:38.4 sec    Cultures;   CAPILLARY BLOOD GLUCOSE      POCT Blood Glucose.: 101 mg/dL (06 Dec 2022 21:35)    Lipid panel:           RADIOLOGY & ADDITIONAL TESTS:    Imaging Personally Reviewed:  [x ] YES      Consultant(s) Notes Reviewed:  [x ] YES     Care Discussed with [x ] Consultants [X ] Patient [ ] Family  [x ]    [x ]  Other; RN

## 2022-12-08 LAB
ANION GAP SERPL CALC-SCNC: 11 MMOL/L — SIGNIFICANT CHANGE UP (ref 5–17)
BASOPHILS # BLD AUTO: 0.04 K/UL — SIGNIFICANT CHANGE UP (ref 0–0.2)
BASOPHILS NFR BLD AUTO: 0.6 % — SIGNIFICANT CHANGE UP (ref 0–2)
BUN SERPL-MCNC: 53 MG/DL — HIGH (ref 7–23)
CALCIUM SERPL-MCNC: 9.5 MG/DL — SIGNIFICANT CHANGE UP (ref 8.5–10.1)
CHLORIDE SERPL-SCNC: 98 MMOL/L — SIGNIFICANT CHANGE UP (ref 96–108)
CO2 SERPL-SCNC: 27 MMOL/L — SIGNIFICANT CHANGE UP (ref 22–31)
CREAT SERPL-MCNC: 4.95 MG/DL — HIGH (ref 0.5–1.3)
EGFR: 12 ML/MIN/1.73M2 — LOW
EOSINOPHIL # BLD AUTO: 0.11 K/UL — SIGNIFICANT CHANGE UP (ref 0–0.5)
EOSINOPHIL NFR BLD AUTO: 1.7 % — SIGNIFICANT CHANGE UP (ref 0–6)
GLUCOSE SERPL-MCNC: 76 MG/DL — SIGNIFICANT CHANGE UP (ref 70–99)
HCT VFR BLD CALC: 38.3 % — LOW (ref 39–50)
HGB BLD-MCNC: 12.1 G/DL — LOW (ref 13–17)
IMM GRANULOCYTES NFR BLD AUTO: 0.6 % — SIGNIFICANT CHANGE UP (ref 0–0.9)
LYMPHOCYTES # BLD AUTO: 2.02 K/UL — SIGNIFICANT CHANGE UP (ref 1–3.3)
LYMPHOCYTES # BLD AUTO: 30.7 % — SIGNIFICANT CHANGE UP (ref 13–44)
MCHC RBC-ENTMCNC: 31.6 G/DL — LOW (ref 32–36)
MCHC RBC-ENTMCNC: 32 PG — SIGNIFICANT CHANGE UP (ref 27–34)
MCV RBC AUTO: 101.3 FL — HIGH (ref 80–100)
MONOCYTES # BLD AUTO: 0.89 K/UL — SIGNIFICANT CHANGE UP (ref 0–0.9)
MONOCYTES NFR BLD AUTO: 13.5 % — SIGNIFICANT CHANGE UP (ref 2–14)
NEUTROPHILS # BLD AUTO: 3.49 K/UL — SIGNIFICANT CHANGE UP (ref 1.8–7.4)
NEUTROPHILS NFR BLD AUTO: 52.9 % — SIGNIFICANT CHANGE UP (ref 43–77)
NRBC # BLD: 0 /100 WBCS — SIGNIFICANT CHANGE UP (ref 0–0)
PLATELET # BLD AUTO: 286 K/UL — SIGNIFICANT CHANGE UP (ref 150–400)
POTASSIUM SERPL-MCNC: 3.6 MMOL/L — SIGNIFICANT CHANGE UP (ref 3.5–5.3)
POTASSIUM SERPL-SCNC: 3.6 MMOL/L — SIGNIFICANT CHANGE UP (ref 3.5–5.3)
RBC # BLD: 3.78 M/UL — LOW (ref 4.2–5.8)
RBC # FLD: 14.6 % — HIGH (ref 10.3–14.5)
SODIUM SERPL-SCNC: 136 MMOL/L — SIGNIFICANT CHANGE UP (ref 135–145)
WBC # BLD: 6.59 K/UL — SIGNIFICANT CHANGE UP (ref 3.8–10.5)
WBC # FLD AUTO: 6.59 K/UL — SIGNIFICANT CHANGE UP (ref 3.8–10.5)

## 2022-12-08 PROCEDURE — 99233 SBSQ HOSP IP/OBS HIGH 50: CPT

## 2022-12-08 PROCEDURE — 99232 SBSQ HOSP IP/OBS MODERATE 35: CPT

## 2022-12-08 RX ADMIN — TRAMADOL HYDROCHLORIDE 50 MILLIGRAM(S): 50 TABLET ORAL at 06:22

## 2022-12-08 RX ADMIN — TRAMADOL HYDROCHLORIDE 50 MILLIGRAM(S): 50 TABLET ORAL at 21:44

## 2022-12-08 RX ADMIN — HEPARIN SODIUM 5000 UNIT(S): 5000 INJECTION INTRAVENOUS; SUBCUTANEOUS at 21:44

## 2022-12-08 RX ADMIN — Medication 1 APPLICATION(S): at 11:56

## 2022-12-08 RX ADMIN — HEPARIN SODIUM 5000 UNIT(S): 5000 INJECTION INTRAVENOUS; SUBCUTANEOUS at 13:24

## 2022-12-08 RX ADMIN — TRAMADOL HYDROCHLORIDE 50 MILLIGRAM(S): 50 TABLET ORAL at 13:28

## 2022-12-08 RX ADMIN — Medication 25 MILLIGRAM(S): at 13:23

## 2022-12-08 RX ADMIN — HEPARIN SODIUM 5000 UNIT(S): 5000 INJECTION INTRAVENOUS; SUBCUTANEOUS at 06:23

## 2022-12-08 RX ADMIN — Medication 25 MILLIGRAM(S): at 06:19

## 2022-12-08 RX ADMIN — PIPERACILLIN AND TAZOBACTAM 25 GRAM(S): 4; .5 INJECTION, POWDER, LYOPHILIZED, FOR SOLUTION INTRAVENOUS at 06:23

## 2022-12-08 RX ADMIN — PIPERACILLIN AND TAZOBACTAM 25 GRAM(S): 4; .5 INJECTION, POWDER, LYOPHILIZED, FOR SOLUTION INTRAVENOUS at 17:16

## 2022-12-08 RX ADMIN — Medication 25 MILLIGRAM(S): at 21:44

## 2022-12-08 RX ADMIN — Medication 40 MILLIGRAM(S): at 06:19

## 2022-12-08 RX ADMIN — AMLODIPINE BESYLATE 10 MILLIGRAM(S): 2.5 TABLET ORAL at 06:19

## 2022-12-08 RX ADMIN — PANTOPRAZOLE SODIUM 40 MILLIGRAM(S): 20 TABLET, DELAYED RELEASE ORAL at 06:18

## 2022-12-08 RX ADMIN — TRAMADOL HYDROCHLORIDE 50 MILLIGRAM(S): 50 TABLET ORAL at 22:40

## 2022-12-08 RX ADMIN — BUMETANIDE 1 MILLIGRAM(S): 0.25 INJECTION INTRAMUSCULAR; INTRAVENOUS at 06:19

## 2022-12-08 NOTE — PHYSICAL THERAPY INITIAL EVALUATION ADULT - GAIT DEVIATIONS NOTED, PT EVAL
decreased uma/decreased velocity of limb motion/decreased step length/decreased stride length/decreased weight-shifting ability

## 2022-12-08 NOTE — PHYSICAL THERAPY INITIAL EVALUATION ADULT - LIVES WITH, PROFILE
Pt states she lives alone in an elevator apt 3rd floor, son and daughter in law lives 5 minutes away and visits all the time to help.

## 2022-12-08 NOTE — PROGRESS NOTE ADULT - ASSESSMENT
68 yo M with ESRD, RA and HTN, presenting due to nonhealing R 4th toe wound. He has joint issues in setting of RA and was previously treated for foot infection at wound care which healed 1 yr ago. this time infection has gotten worse over the past 3 weeks causing severe pain and prompting ED visit.   Sed rate 42.   xray: Right fourth digit with destruction of the PIP joint with dislocation, suspicious for septic arthritis/osteomyelitis.  patient clearly has osteomyelitis of his toe and MRI likely does not need to be done   discussed with patient the risks of not amputating including long term antibiotics, spreading infection and higher amputation   risks of surgery per podiatry   Vascular Arterial lower extremity bilateral 10.29.21 No evidence of arterial occlusion in the bilateral lower extremities.  Elevated velocities within the bilateral lower extremity arteries, as above, compatible with segmental stenoses.     12/6: amputation today, continue IV antibiotics, continue regular sessions of hemodialysis   12/8: s/p partial amputation of 4th toe right foot, no fevers, RA, no leukocytosis, pathology is pending, Zosyn IV continued.     Plan:  continue (Zosyn) Piperacillin/tazobactam 3.375 grams every 12 hours  follow all cultures   awaiting for pathology   HD per renal

## 2022-12-08 NOTE — PHYSICAL THERAPY INITIAL EVALUATION ADULT - GENERAL OBSERVATIONS, REHAB EVAL
Pt is alert, oriented x 4, follow instructions, on room air, dressing R foot intact, c/o pain in the R foot, weakness in the leg.

## 2022-12-08 NOTE — PROGRESS NOTE ADULT - SUBJECTIVE AND OBJECTIVE BOX
NEPHROLOGY PROGRESS NOTE    CHIEF COMPLAINT:  ESRD    HPI:  No complaints    ROS:  no SOB    EXAM:  T(F): 97.7 (12-08-22 @ 11:42)  HR: 72 (12-08-22 @ 11:42)  BP: 132/73 (12-08-22 @ 11:42)  RR: 17 (12-08-22 @ 11:42)  SpO2: 96% (12-08-22 @ 11:42)    Conversant, in no apparent distress  Normal respiratory effort, lungs clear bilaterally  Heart RRR with no murmur, no peripheral edema         LABS                             12.1   6.59  )-----------( 286      ( 08 Dec 2022 06:20 )             38.3          12-08    136  |  98  |  53<H>  ----------------------------<  76  3.6   |  27  |  4.95<H>    Ca    9.5      08 Dec 2022 06:20    TPro  7.6  /  Alb  3.3  /  TBili  0.6  /  DBili  x   /  AST  55<H>  /  ALT  114<H>  /  AlkPhos  86  12-07             Assessment   ESRD; maintenance    Plan:  HD tomorrow

## 2022-12-08 NOTE — PROGRESS NOTE ADULT - ASSESSMENT
68 yo M with ESRD, RA and HTN, presenting due to nonhealing R 4th toe wound. He has joint issues in setting of RA and was previously treated for foot infection at wound care which healed 1 yr ago. this time infection has gotten worse over the past 3 weeks causing severe pain and prompting ED visit. Podiatry saw pt in ED and recommended vanco/zosyn for suspected osteomyelitis. vitals stable. labs significant for lactate 3.7 improving to 1.7 after IVF. Sed rate 42.     Acute right 4th toe osteomyelitis   sepsis -POA- now ruled out, vitals stable   -cont current antibiotics and pain control.  -s/p right foot partial fourth ray resection closed (12/6/22)  - ID following and will help with antibiotic course.   -Right foot wound growing Proteus mirabilis, Staph epidermidis, Bacteroides fragilis (final).  RA  -on prednisone   HTN -controlled. cont current meds. Monitor.   ESRD -Continue hemodialysis and keep net negative fluid balance   Legally blind bilaterally.    dvtpp-heparin sc   Full code

## 2022-12-08 NOTE — PHYSICAL THERAPY INITIAL EVALUATION ADULT - THERAPY FREQUENCY, PT EVAL
CC: 1 year 9 month y/o presents with CC of trauma to lower anterior s/p fall.     HPI: Patients father reports child fell and hit chip on ground causing teeth to be luxated out of place. Denies changes in behavior, loss of consciousness, fever and vomiting, or changes in vision.    Med HX: No pertinent past medical history      No significant past surgical history    MOUTH INJURY    EOE:   TMJ (WNL)  Lacerations (-)  Trismus (-)  LAD (-)  Swelling (-)  LOC (-)  Dysphagia (-)    IOE: Lateral luxation of N and P with buccal plate fracture   Hard/Soft palate (WNL)  Tongue/Floor of Mouth (WNL)  Lacerations (+) Minor labial laceration   Buccal Mucosa (WNL)  Percussion (-)  Palpation (-)  Swelling (-)  Mobility Grade III mobility of # N and O    Radiographs: Radiographs not obtained     Assessment: Lateral luxation of # N and O with fracture to buccal plate     Treatment: Discussed clinical findings. Informed consent. Protective stabalization.. Administered 1/4 carpule 2% lidoocaine 1:100k epi via local infiltration. Extraction of N and O with forceps. Hemostasis achieved with gauze pressure. Post op instructions given to father in verbal and written form.       Recommendations:   1. Comprehensive dental care with outpatient peditric dentist.  2. If any difficulty breathing/swallowing or fever and swelling occur, return to ED.    Toni Gonzalez DMD, #[id number] 
3-5x/week

## 2022-12-08 NOTE — PROGRESS NOTE ADULT - SUBJECTIVE AND OBJECTIVE BOX
Patient is a 67y old  Male who presents with a chief complaint of      INTERVAL HPI/OVERNIGHT EVENTS:  Patient seen and evaluated at bedside.  Pt is resting comfortable in NAD. Denies N/V/F/C.    Allergies    No Known Allergies    Intolerances        Vital Signs Last 24 Hrs  T(C): 36.5 (08 Dec 2022 11:42), Max: 36.9 (07 Dec 2022 12:40)  T(F): 97.7 (08 Dec 2022 11:42), Max: 98.4 (07 Dec 2022 12:40)  HR: 72 (08 Dec 2022 11:42) (65 - 88)  BP: 132/73 (08 Dec 2022 11:42) (129/80 - 153/84)  BP(mean): --  RR: 17 (08 Dec 2022 11:42) (17 - 18)  SpO2: 96% (08 Dec 2022 11:42) (95% - 99%)    Parameters below as of 08 Dec 2022 11:41  Patient On (Oxygen Delivery Method): room air        LABS:                        12.1   6.59  )-----------( 286      ( 08 Dec 2022 06:20 )             38.3     12-08    136  |  98  |  53<H>  ----------------------------<  76  3.6   |  27  |  4.95<H>    Ca    9.5      08 Dec 2022 06:20    TPro  7.6  /  Alb  3.3  /  TBili  0.6  /  DBili  x   /  AST  55<H>  /  ALT  114<H>  /  AlkPhos  86  12-07        CAPILLARY BLOOD GLUCOSE      Lower Extremity Physical Exam:  Vascular: DP/PT 2/4, B/L, CFT <3 seconds B/L, Temperature gradient warm to cool B/L.   Neuro: Epicritic sensation intact to the level of forefoot B/L.  Musculoskeletal/Ortho: Progressive Rheumatoid arthritis B/L with ulnar deviation of the lesser metatarsals. HAV B/L.   Skin: s/p right foot partial fourth ray resection closed (DOS 12/6/22), sutures intact, steri strip intact distally, 1mm central dehiscence, remainder of the skin well coapted, no hematoma noted, scant sanguinous drainage expressed, no fluctuance, flaps warm and viable. Left foot no wounds, no clinical signs of infection.         RADIOLOGY & ADDITIONAL TESTS:

## 2022-12-08 NOTE — PROGRESS NOTE ADULT - SUBJECTIVE AND OBJECTIVE BOX
Patient is a 67y old  Male who presents with a chief complaint of     OVERNIGHT EVENTS:  Patients seen and examined at bedside this morning. No acute events overnight.    REVIEW OF SYSTEMS: denies chest pain/SOB, diaphoresis, no F/C, cough, dizziness, headache, blurry vision, nausea, vomiting, abdominal pain. All others review of systems negative     MEDICATIONS  (STANDING):  amLODIPine   Tablet 10 milliGRAM(s) Oral daily  buMETAnide 1 milliGRAM(s) Oral daily  cadexomer iodine 0.9% Gel 1 Application(s) Topical daily  heparin   Injectable 5000 Unit(s) SubCutaneous every 8 hours  hydrALAZINE 25 milliGRAM(s) Oral every 8 hours  pantoprazole    Tablet 40 milliGRAM(s) Oral before breakfast  piperacillin/tazobactam IVPB.. 3.375 Gram(s) IV Intermittent every 12 hours  predniSONE   Tablet 40 milliGRAM(s) Oral daily  senna 2 Tablet(s) Oral at bedtime    MEDICATIONS  (PRN):  acetaminophen     Tablet .. 650 milliGRAM(s) Oral every 6 hours PRN Temp greater or equal to 38C (100.4F), Mild Pain (1 - 3)  acetaminophen     Tablet .. 650 milliGRAM(s) Oral every 6 hours PRN Mild Pain (1 - 3)  aluminum hydroxide/magnesium hydroxide/simethicone Suspension 30 milliLiter(s) Oral every 4 hours PRN Dyspepsia  bisacodyl 5 milliGRAM(s) Oral every 12 hours PRN Constipation  melatonin 3 milliGRAM(s) Oral at bedtime PRN Insomnia  ondansetron Injectable 4 milliGRAM(s) IV Push every 8 hours PRN Nausea and/or Vomiting  traMADol 50 milliGRAM(s) Oral every 6 hours PRN Severe Pain (7 - 10)      Allergies    No Known Allergies    Intolerances        T(F): 97.7 (12-08-22 @ 11:42), Max: 98.4 (12-07-22 @ 12:40)  HR: 72 (12-08-22 @ 11:42) (65 - 88)  BP: 132/73 (12-08-22 @ 11:42) (129/80 - 153/84)  RR: 17 (12-08-22 @ 11:42) (17 - 18)  SpO2: 96% (12-08-22 @ 11:42) (95% - 99%)  Wt(kg): --    PHYSICAL EXAM:  GENERAL: NAD  HEAD:  Atraumatic, Normocephalic  EYES: PERRLA, conjunctiva and sclera clear  ENMT: Moist mucous membranes  NECK: Supple, No JVD, Normal thyroid  NERVOUS SYSTEM:  Alert & Awake  CHEST/LUNG: Clear to percussion bilaterally;   HEART: Regular rate and rhythm;   ABDOMEN: Soft, Nontender, Nondistended; Bowel sounds present  EXTREMITIES:  no edema BL LE  SKIN: moist    LABS:                        12.1   6.59  )-----------( 286      ( 08 Dec 2022 06:20 )             38.3     12-08    136  |  98  |  53<H>  ----------------------------<  76  3.6   |  27  |  4.95<H>    Ca    9.5      08 Dec 2022 06:20    TPro  7.6  /  Alb  3.3  /  TBili  0.6  /  DBili  x   /  AST  55<H>  /  ALT  114<H>  /  AlkPhos  86  12-07        Cultures;   CAPILLARY BLOOD GLUCOSE        Lipid panel:           RADIOLOGY & ADDITIONAL TESTS:    Imaging Personally Reviewed:  [x ] YES      Consultant(s) Notes Reviewed:  [x ] YES     Care Discussed with [x ] Consultants [X ] Patient [ ] Family  [x ]    [x ]  Other; RN

## 2022-12-08 NOTE — PROGRESS NOTE ADULT - ASSESSMENT
67M s/p right foot partial fourth ray resection closed (12/6/22)  - Pt was seen and evaluated.   - Afebrile, no leukocytosis.  - s/p right foot partial fourth ray resection closed (DOS 12/6/22), sutures intact, steri strip intact distally, 1mm central dehiscence, remainder of the skin well coapted, no hematoma noted, scant sanguinous drainage expressed, no fluctuance, flaps warm and viable. Left foot no wounds, no clinical signs of infection.   - 12/3 Right foot wound with Proteus mirabilis, Staph epidermidis, Bacteroides fragilis (final).  - ID recs, appreciated.  - Right foot fourth digit OR pathology, pending.   - Pod Plan: Pt is stable for discharge tomorrow,  12/9 pending viability and stability.  - Followup information and wound care instructions can be found in the discharge provider note.  - Discussed with attending.

## 2022-12-08 NOTE — PROGRESS NOTE ADULT - NS ATTEND AMEND GEN_ALL_CORE FT
agree with above  would wait for pathology   if negative will likely stop antibiotics or give short course of PO antibiotics for skin and soft tissue infection  if margins are positive then will arrange for antibiotics to be given 3 days a week at his hemodialysis sessions    Immanuel Marie, DO  Infectious Disease Attending  Reachable via Microsoft Teams or ID office: [phone]  After 5pm/weekends please call [phone] for all inquiries and new consults

## 2022-12-08 NOTE — PROGRESS NOTE ADULT - SUBJECTIVE AND OBJECTIVE BOX
MARLA WELLS III  MRN-[id number]    Follow Up:  OM    Interval History: the pt was seen and examined earlier, no distress, no new complaints, in a good mood. The pt is afebrile, RA, no wbc.     PAST MEDICAL & SURGICAL HISTORY:  ESRD on dialysis          ROS:    [ ] Unobtainable because:  [x ] All other systems negative    Constitutional: no fever, no chills  Head: no trauma  Eyes: no vision changes, no eye pain  ENT:  no sore throat, no rhinorrhea  Cardiovascular:  no chest pain, no palpitation  Respiratory:  no SOB, no cough  GI:  no abd pain, no vomiting, no diarrhea  urinary: no dysuria, no hematuria, no flank pain  musculoskeletal:  denies post op pain   skin:  no rash  neurology:  no headache, no seizure, no change in mental status  psych: no anxiety, no depression         Allergies  No Known Allergies        ANTIMICROBIALS:  piperacillin/tazobactam IVPB.. 3.375 every 12 hours      OTHER MEDS:  acetaminophen     Tablet .. 650 milliGRAM(s) Oral every 6 hours PRN  acetaminophen     Tablet .. 650 milliGRAM(s) Oral every 6 hours PRN  aluminum hydroxide/magnesium hydroxide/simethicone Suspension 30 milliLiter(s) Oral every 4 hours PRN  amLODIPine   Tablet 10 milliGRAM(s) Oral daily  bisacodyl 5 milliGRAM(s) Oral every 12 hours PRN  buMETAnide 1 milliGRAM(s) Oral daily  cadexomer iodine 0.9% Gel 1 Application(s) Topical daily  heparin   Injectable 5000 Unit(s) SubCutaneous every 8 hours  hydrALAZINE 25 milliGRAM(s) Oral every 8 hours  melatonin 3 milliGRAM(s) Oral at bedtime PRN  ondansetron Injectable 4 milliGRAM(s) IV Push every 8 hours PRN  pantoprazole    Tablet 40 milliGRAM(s) Oral before breakfast  predniSONE   Tablet 40 milliGRAM(s) Oral daily  senna 2 Tablet(s) Oral at bedtime  traMADol 50 milliGRAM(s) Oral every 6 hours PRN      Vital Signs Last 24 Hrs  T(C): 36.5 (08 Dec 2022 11:42), Max: 36.8 (07 Dec 2022 17:28)  T(F): 97.7 (08 Dec 2022 11:42), Max: 98.3 (07 Dec 2022 17:28)  HR: 72 (08 Dec 2022 11:42) (65 - 73)  BP: 132/73 (08 Dec 2022 11:42) (129/80 - 153/84)  BP(mean): --  RR: 17 (08 Dec 2022 11:42) (17 - 18)  SpO2: 96% (08 Dec 2022 11:42) (96% - 99%)    Parameters below as of 08 Dec 2022 11:41  Patient On (Oxygen Delivery Method): room air        Physical Exam:  General:    NAD,  non toxic  Head: atraumatic, normocephalic  Eye: normal sclera and conjunctiva  ENT:    no oral lesions, neck supple  Cardio:     regular S1, S2,  no murmur, Respiratory:    clear b/l,    no wheezing  abd:     soft,   BS +,   no tenderness  :   no CVAT,  no suprapubic tenderness,   no  arellano  Musculoskeletal:   no joint swelling,   no edema, gouty changes in fingers, toes and elbows   vascular: + right chest wall permacath c/d/i , +PIV   Skin:    no rash, foot dressed   Neurologic:     no focal deficit  psych: normal affect    WBC Count: 6.59 K/uL (12-08 @ 06:20)  WBC Count: 9.15 K/uL (12-07 @ 07:50)  WBC Count: 6.97 K/uL (12-06 @ 07:59)  WBC Count: 6.98 K/uL (12-05 @ 07:00)  WBC Count: 5.17 K/uL (12-03 @ 02:50)  WBC Count: 5.22 K/uL (12-02 @ 19:54)                            12.1   6.59  )-----------( 286      ( 08 Dec 2022 06:20 )             38.3       12-08    136  |  98  |  53<H>  ----------------------------<  76  3.6   |  27  |  4.95<H>    Ca    9.5      08 Dec 2022 06:20    TPro  7.6  /  Alb  3.3  /  TBili  0.6  /  DBili  x   /  AST  55<H>  /  ALT  114<H>  /  AlkPhos  86  12-07          Creatinine Trend: 4.95<--, 5.74<--, 4.48<--, 5.58<--, 3.58<--, 3.40<--      MICROBIOLOGY:  v  .Abscess right foot  12-03-22   Numerous Proteus mirabilis  Few Staphylococcus epidermidis "Susceptibilities not performed"  Numerous Bacteroides fragilis "Susceptibilities not performed"  --  Proteus mirabilis    C-Reactive Protein, Serum: 66 (12-03)    SARS-CoV-2 Result: NotDetec (12-05-22 @ 15:00)      RADIOLOGY:

## 2022-12-08 NOTE — PHYSICAL THERAPY INITIAL EVALUATION ADULT - ADDITIONAL COMMENTS
Pt states he is mostly independent in his apartment, he has good family support with son and daughter in law that lives 5 min away and come visits regularly to assist. Pt states he has DME at home- cane, rolling walker , wheelchair, rollator

## 2022-12-08 NOTE — PHYSICAL THERAPY INITIAL EVALUATION ADULT - STRENGTHENING, PT EVAL
Improve strength in the UE and LE to 5/5, improve endurance to good and be able to perform functional tasks-bed mobility, sitting, standing, transfers and ambulate in a safe manner with  assistive device and prevent falls.

## 2022-12-09 ENCOUNTER — TRANSCRIPTION ENCOUNTER (OUTPATIENT)
Age: 67
End: 2022-12-09

## 2022-12-09 VITALS
OXYGEN SATURATION: 100 % | TEMPERATURE: 99 F | DIASTOLIC BLOOD PRESSURE: 67 MMHG | HEART RATE: 75 BPM | RESPIRATION RATE: 18 BRPM | SYSTOLIC BLOOD PRESSURE: 132 MMHG

## 2022-12-09 LAB — SURGICAL PATHOLOGY STUDY: SIGNIFICANT CHANGE UP

## 2022-12-09 PROCEDURE — 99239 HOSP IP/OBS DSCHRG MGMT >30: CPT

## 2022-12-09 PROCEDURE — 99232 SBSQ HOSP IP/OBS MODERATE 35: CPT

## 2022-12-09 RX ORDER — HYDRALAZINE HCL 50 MG
1 TABLET ORAL
Qty: 0 | Refills: 0 | DISCHARGE
Start: 2022-12-09

## 2022-12-09 RX ORDER — PANTOPRAZOLE SODIUM 20 MG/1
1 TABLET, DELAYED RELEASE ORAL
Qty: 0 | Refills: 0 | DISCHARGE
Start: 2022-12-09

## 2022-12-09 RX ORDER — AMLODIPINE BESYLATE 2.5 MG/1
1 TABLET ORAL
Qty: 0 | Refills: 0 | DISCHARGE
Start: 2022-12-09

## 2022-12-09 RX ORDER — BUMETANIDE 0.25 MG/ML
1 INJECTION INTRAMUSCULAR; INTRAVENOUS
Qty: 0 | Refills: 0 | DISCHARGE
Start: 2022-12-09

## 2022-12-09 RX ADMIN — TRAMADOL HYDROCHLORIDE 50 MILLIGRAM(S): 50 TABLET ORAL at 08:26

## 2022-12-09 RX ADMIN — TRAMADOL HYDROCHLORIDE 50 MILLIGRAM(S): 50 TABLET ORAL at 08:45

## 2022-12-09 RX ADMIN — HEPARIN SODIUM 5000 UNIT(S): 5000 INJECTION INTRAVENOUS; SUBCUTANEOUS at 05:27

## 2022-12-09 RX ADMIN — Medication 40 MILLIGRAM(S): at 05:26

## 2022-12-09 RX ADMIN — HEPARIN SODIUM 5000 UNIT(S): 5000 INJECTION INTRAVENOUS; SUBCUTANEOUS at 13:32

## 2022-12-09 RX ADMIN — PIPERACILLIN AND TAZOBACTAM 25 GRAM(S): 4; .5 INJECTION, POWDER, LYOPHILIZED, FOR SOLUTION INTRAVENOUS at 05:25

## 2022-12-09 RX ADMIN — BUMETANIDE 1 MILLIGRAM(S): 0.25 INJECTION INTRAMUSCULAR; INTRAVENOUS at 05:26

## 2022-12-09 RX ADMIN — Medication 25 MILLIGRAM(S): at 05:26

## 2022-12-09 RX ADMIN — PANTOPRAZOLE SODIUM 40 MILLIGRAM(S): 20 TABLET, DELAYED RELEASE ORAL at 05:26

## 2022-12-09 RX ADMIN — Medication 25 MILLIGRAM(S): at 13:32

## 2022-12-09 RX ADMIN — AMLODIPINE BESYLATE 10 MILLIGRAM(S): 2.5 TABLET ORAL at 05:29

## 2022-12-09 NOTE — PROGRESS NOTE ADULT - ASSESSMENT
67M s/p right foot partial fourth ray resection closed (12/6/22)  - Pt was seen and evaluated.   - Afebrile, WBC 6.59  - s/p right foot partial fourth ray resection closed (DOS 12/6/22), sutures intact, steri strip intact distally, 1mm central dehiscence, remainder of the skin well coapted, no hematoma noted, scant sanguinous drainage expressed, no fluctuance, flaps warm and viable. Left foot no wounds, no clinical signs of infection.   - 12/3 Right foot wound with Proteus mirabilis, Staph epidermidis, Bacteroides fragilis (final).  - ID recs.  - Right foot fourth digit OR pathology, pending.   - Podiatry stable for D/C, follow up info in Discharge provider note.   - Followup information and wound care instructions can be found in the discharge provider note.  - Seen with attending.   67M s/p right foot partial fourth ray resection closed (12/6/22)  - Pt was seen and evaluated.   - Afebrile, WBC 6.59  - s/p right foot partial fourth ray resection closed (DOS 12/6/22), sutures intact, steri strip intact distally, 1mm central dehiscence, remainder of the skin well coapted, no hematoma noted, scant sanguinous drainage expressed, no fluctuance, flaps warm and viable. Left foot no wounds, no clinical signs of infection.   - 12/3 Right foot wound with Proteus mirabilis, Staph epidermidis, Bacteroides fragilis (final).  - ID PO recs appreicated.   - Right foot fourth digit OR pathology, pending.   - Podiatry stable for D/C, follow up info in Discharge provider note.   - Followup information and wound care instructions can be found in the discharge provider note.  - Seen with attending.

## 2022-12-09 NOTE — PROGRESS NOTE ADULT - SUBJECTIVE AND OBJECTIVE BOX
[facility] NEPHROLOGY SERVICES, [facility]  NEPHROLOGY AND HYPERTENSION  [address]  [address]  [place] [address]  [phone]    MD ELLI WHITTAKER MD ANDREY GONCHARUK, MD MADHU KORRAPATI, MD YELENA ROSENBERG, MD RUBEN SOLORZANO, MD JANENE MATTA MD          Patient events noted    MEDICATIONS  (STANDING):  amLODIPine   Tablet 10 milliGRAM(s) Oral daily  buMETAnide 1 milliGRAM(s) Oral daily  cadexomer iodine 0.9% Gel 1 Application(s) Topical daily  heparin   Injectable 5000 Unit(s) SubCutaneous every 8 hours  hydrALAZINE 25 milliGRAM(s) Oral every 8 hours  pantoprazole    Tablet 40 milliGRAM(s) Oral before breakfast  predniSONE   Tablet 40 milliGRAM(s) Oral daily  senna 2 Tablet(s) Oral at bedtime    MEDICATIONS  (PRN):  acetaminophen     Tablet .. 650 milliGRAM(s) Oral every 6 hours PRN Temp greater or equal to 38C (100.4F), Mild Pain (1 - 3)  acetaminophen     Tablet .. 650 milliGRAM(s) Oral every 6 hours PRN Mild Pain (1 - 3)  aluminum hydroxide/magnesium hydroxide/simethicone Suspension 30 milliLiter(s) Oral every 4 hours PRN Dyspepsia  bisacodyl 5 milliGRAM(s) Oral every 12 hours PRN Constipation  melatonin 3 milliGRAM(s) Oral at bedtime PRN Insomnia  ondansetron Injectable 4 milliGRAM(s) IV Push every 8 hours PRN Nausea and/or Vomiting  traMADol 50 milliGRAM(s) Oral every 6 hours PRN Severe Pain (7 - 10)      12-08-22 @ 07:01  -  12-09-22 @ 07:00  --------------------------------------------------------  IN: 460 mL / OUT: 0 mL / NET: 460 mL    12-09-22 @ 07:01  -  12-09-22 @ 23:32  --------------------------------------------------------  IN: 720 mL / OUT: 1000 mL / NET: -280 mL      PHYSICAL EXAM:      T(C): 37.2 (12-09-22 @ 16:16), Max: 37.2 (12-09-22 @ 16:16)  HR: 75 (12-09-22 @ 16:16) (66 - 76)  BP: 132/67 (12-09-22 @ 16:16) (127/72 - 145/80)  RR: 18 (12-09-22 @ 16:16) (16 - 19)  SpO2: 100% (12-09-22 @ 16:16) (93% - 100%)  Wt(kg): --  Lungs clear  Heart S1S2  Abd soft NT ND  Extremities:   tr edema                                    12.1   6.59  )-----------( 286      ( 08 Dec 2022 06:20 )             38.3     12-08    136  |  98  |  53<H>  ----------------------------<  76  3.6   |  27  |  4.95<H>    Ca    9.5      08 Dec 2022 06:20          Creatinine Trend: 4.95<--, 5.74<--, 4.48<--, 5.58<--, 3.58<--, 3.40<--      Assessment   ESRD; maintenance    Plan:  HD for today  UF as tolerated   Discharge planning       Marcel Carmona MD

## 2022-12-09 NOTE — PROGRESS NOTE ADULT - PROVIDER SPECIALTY LIST ADULT
Nephrology
Podiatry
Hospitalist
Nephrology
Podiatry
Hospitalist
Hospitalist
Infectious Disease
Podiatry
Hospitalist

## 2022-12-09 NOTE — DISCHARGE NOTE NURSING/CASE MANAGEMENT/SOCIAL WORK - NSDCFUADDAPPT_GEN_ALL_CORE_FT
Podiatry Discharge Instructions:  Follow up: Please follow up with Dr. Piedra within 1 week of discharge from the hospital, please call [phone] for appointment and discuss that you recently were seen in the hospital.  Wound Care: Please leave your dressing clean dry intact until your follow up appointment   Weight bearing: Please weight bear as tolerated in a surgical shoe.  Antibiotics: Please continue as instructed.

## 2022-12-09 NOTE — DIETITIAN INITIAL EVALUATION ADULT - OTHER INFO
Pt with PMH legally blind bilaterally, HTN, RA, ESRD on HD; admitted for acute R 4th toe OM, s/p partial amputation of 4th toe R foot (12/6).  Pt seen while receiving HD treatment.  Pt independent for ADLs PTA; lives with son who does food shopping/cooking.  Pt reports following renal diet at home, avoids Na and high potassium foods like bananas, tomatoes, oranges, avocados; good appetite and po intake PTA. Continues with good appetite, po intake % during admission; amenable to Nepro nutrition supplement for additional kcal and protein. Denies any chew/swallowing difficulty; denies N/V/D/C.  Wt stable at 73 kg for at least 1 year; UBW 85 kg x several years ago.

## 2022-12-09 NOTE — DISCHARGE NOTE NURSING/CASE MANAGEMENT/SOCIAL WORK - NSDCPEFALRISK_GEN_ALL_CORE
For information on Fall & Injury Prevention, visit: [email or web address] OR  [email or web address] OR  https://www.cdc.gov/steadi/patient.html

## 2022-12-09 NOTE — PROGRESS NOTE ADULT - SUBJECTIVE AND OBJECTIVE BOX
Patient is a 67y old  Male who presents with a chief complaint of WOUND INFECTION     (09 Dec 2022 12:16)      OVERNIGHT EVENTS:  Patients seen and examined at bedside this morning. No acute events overnight.    REVIEW OF SYSTEMS: denies chest pain/SOB, diaphoresis, no F/C, cough, dizziness, headache, blurry vision, nausea, vomiting, abdominal pain. All others review of systems negative     MEDICATIONS  (STANDING):  amLODIPine   Tablet 10 milliGRAM(s) Oral daily  buMETAnide 1 milliGRAM(s) Oral daily  cadexomer iodine 0.9% Gel 1 Application(s) Topical daily  heparin   Injectable 5000 Unit(s) SubCutaneous every 8 hours  hydrALAZINE 25 milliGRAM(s) Oral every 8 hours  pantoprazole    Tablet 40 milliGRAM(s) Oral before breakfast  predniSONE   Tablet 40 milliGRAM(s) Oral daily  senna 2 Tablet(s) Oral at bedtime    MEDICATIONS  (PRN):  acetaminophen     Tablet .. 650 milliGRAM(s) Oral every 6 hours PRN Temp greater or equal to 38C (100.4F), Mild Pain (1 - 3)  acetaminophen     Tablet .. 650 milliGRAM(s) Oral every 6 hours PRN Mild Pain (1 - 3)  aluminum hydroxide/magnesium hydroxide/simethicone Suspension 30 milliLiter(s) Oral every 4 hours PRN Dyspepsia  bisacodyl 5 milliGRAM(s) Oral every 12 hours PRN Constipation  melatonin 3 milliGRAM(s) Oral at bedtime PRN Insomnia  ondansetron Injectable 4 milliGRAM(s) IV Push every 8 hours PRN Nausea and/or Vomiting  traMADol 50 milliGRAM(s) Oral every 6 hours PRN Severe Pain (7 - 10)      Allergies    No Known Allergies    Intolerances        T(F): 97.7 (12-09-22 @ 13:39), Max: 98.4 (12-09-22 @ 12:15)  HR: 76 (12-09-22 @ 13:39) (66 - 78)  BP: 129/80 (12-09-22 @ 13:39) (127/72 - 150/84)  RR: 17 (12-09-22 @ 13:39) (16 - 19)  SpO2: 98% (12-09-22 @ 13:39) (93% - 100%)  Wt(kg): --    PHYSICAL EXAM:  GENERAL: NAD  HEAD:  Atraumatic, Normocephalic  EYES: PERRLA, conjunctiva and sclera clear  ENMT: Moist mucous membranes  NECK: Supple, No JVD, Normal thyroid  NERVOUS SYSTEM:  Alert & Awake  CHEST/LUNG: Clear to percussion bilaterally;   HEART: Regular rate and rhythm;   ABDOMEN: Soft, Nontender, Nondistended; Bowel sounds present  EXTREMITIES:  no edema BL LE  SKIN: moist    LABS:                        12.1   6.59  )-----------( 286      ( 08 Dec 2022 06:20 )             38.3     12-08    136  |  98  |  53<H>  ----------------------------<  76  3.6   |  27  |  4.95<H>    Ca    9.5      08 Dec 2022 06:20          Cultures;   CAPILLARY BLOOD GLUCOSE        Lipid panel:           RADIOLOGY & ADDITIONAL TESTS:    Imaging Personally Reviewed:  [x ] YES      Consultant(s) Notes Reviewed:  [x ] YES     Care Discussed with [x ] Consultants [X ] Patient [ ] Family  [x ]    [x ]  Other; RN

## 2022-12-09 NOTE — PROGRESS NOTE ADULT - NUTRITIONAL ASSESSMENT
This patient has been assessed with a concern for Malnutrition and has been determined to have a diagnosis/diagnoses of Moderate protein-calorie malnutrition.    This patient is being managed with:   Diet Regular-  For patients receiving Renal Replacement - No Protein Restr No Conc K No Conc Phos Low Sodium (RENAL)  Entered: Dec  3 2022 12:56AM    The following pending diet order is being considered for treatment of Moderate protein-calorie malnutrition:  Diet Renal Restrictions-  For patients receiving Renal Replacement - No Protein Restr No Conc K No Conc Phos Low Sodium  Supplement Feeding Modality:  Oral  Nepro Cans or Servings Per Day:  1       Frequency:  Daily  Entered: Dec  9 2022 12:43PM

## 2022-12-09 NOTE — DIETITIAN NUTRITION RISK NOTIFICATION - TREATMENT: THE FOLLOWING DIET HAS BEEN RECOMMENDED
Diet, Renal Restrictions:   For patients receiving Renal Replacement - No Protein Restr, No Conc K, No Conc Phos, Low Sodium  Supplement Feeding Modality:  Oral  Nepro Cans or Servings Per Day:  1       Frequency:  Daily (12-09-22 @ 12:44) [Pending Verification By Attending]  Diet, Regular:   For patients receiving Renal Replacement - No Protein Restr, No Conc K, No Conc Phos, Low Sodium (RENAL) (12-03-22 @ 00:58) [Active]

## 2022-12-09 NOTE — DIETITIAN INITIAL EVALUATION ADULT - PERTINENT MEDS FT
MEDICATIONS  (STANDING):  amLODIPine   Tablet 10 milliGRAM(s) Oral daily  buMETAnide 1 milliGRAM(s) Oral daily  cadexomer iodine 0.9% Gel 1 Application(s) Topical daily  heparin   Injectable 5000 Unit(s) SubCutaneous every 8 hours  hydrALAZINE 25 milliGRAM(s) Oral every 8 hours  pantoprazole    Tablet 40 milliGRAM(s) Oral before breakfast  piperacillin/tazobactam IVPB.. 3.375 Gram(s) IV Intermittent every 12 hours  predniSONE   Tablet 40 milliGRAM(s) Oral daily  senna 2 Tablet(s) Oral at bedtime    MEDICATIONS  (PRN):  acetaminophen     Tablet .. 650 milliGRAM(s) Oral every 6 hours PRN Temp greater or equal to 38C (100.4F), Mild Pain (1 - 3)  acetaminophen     Tablet .. 650 milliGRAM(s) Oral every 6 hours PRN Mild Pain (1 - 3)  aluminum hydroxide/magnesium hydroxide/simethicone Suspension 30 milliLiter(s) Oral every 4 hours PRN Dyspepsia  bisacodyl 5 milliGRAM(s) Oral every 12 hours PRN Constipation  melatonin 3 milliGRAM(s) Oral at bedtime PRN Insomnia  ondansetron Injectable 4 milliGRAM(s) IV Push every 8 hours PRN Nausea and/or Vomiting  traMADol 50 milliGRAM(s) Oral every 6 hours PRN Severe Pain (7 - 10)

## 2022-12-09 NOTE — PROGRESS NOTE ADULT - SUBJECTIVE AND OBJECTIVE BOX
MARLA WELLS III  MRN-[id number]    Follow Up:  OM    Interval History: the pt was seen and examined post HD, doing well, has no new complaints. The pt is afebrile, RA, no WBC.     PAST MEDICAL & SURGICAL HISTORY:  ESRD on dialysis          ROS:    [ ] Unobtainable because:  [x ] All other systems negative    Constitutional: no fever, no chills  Head: no trauma  Eyes: no vision changes, no eye pain  ENT:  no sore throat, no rhinorrhea  Cardiovascular:  no chest pain, no palpitation  Respiratory:  no SOB, no cough  GI:  no abd pain, no vomiting, no diarrhea  urinary: no dysuria, no hematuria, no flank pain  musculoskeletal:  no joint pain, no joint swelling  skin:  no rash  neurology:  no headache, no seizure, no change in mental status  psych: no anxiety, no depression         Allergies  No Known Allergies        ANTIMICROBIALS:      OTHER MEDS:  acetaminophen     Tablet .. 650 milliGRAM(s) Oral every 6 hours PRN  acetaminophen     Tablet .. 650 milliGRAM(s) Oral every 6 hours PRN  aluminum hydroxide/magnesium hydroxide/simethicone Suspension 30 milliLiter(s) Oral every 4 hours PRN  amLODIPine   Tablet 10 milliGRAM(s) Oral daily  bisacodyl 5 milliGRAM(s) Oral every 12 hours PRN  buMETAnide 1 milliGRAM(s) Oral daily  cadexomer iodine 0.9% Gel 1 Application(s) Topical daily  heparin   Injectable 5000 Unit(s) SubCutaneous every 8 hours  hydrALAZINE 25 milliGRAM(s) Oral every 8 hours  melatonin 3 milliGRAM(s) Oral at bedtime PRN  ondansetron Injectable 4 milliGRAM(s) IV Push every 8 hours PRN  pantoprazole    Tablet 40 milliGRAM(s) Oral before breakfast  predniSONE   Tablet 40 milliGRAM(s) Oral daily  senna 2 Tablet(s) Oral at bedtime  traMADol 50 milliGRAM(s) Oral every 6 hours PRN      Vital Signs Last 24 Hrs  T(C): 36.5 (09 Dec 2022 13:39), Max: 36.9 (09 Dec 2022 12:15)  T(F): 97.7 (09 Dec 2022 13:39), Max: 98.4 (09 Dec 2022 12:15)  HR: 76 (09 Dec 2022 13:39) (66 - 78)  BP: 129/80 (09 Dec 2022 13:39) (127/72 - 150/84)  BP(mean): --  RR: 17 (09 Dec 2022 13:39) (16 - 19)  SpO2: 98% (09 Dec 2022 13:39) (93% - 100%)    Parameters below as of 09 Dec 2022 12:15  Patient On (Oxygen Delivery Method): room air        Physical Exam:  General:    NAD,  non toxic  Head: atraumatic, normocephalic  Eye: normal sclera and conjunctiva  ENT:    no oral lesions, neck supple, some missing teeth   Cardio:     regular S1, S2,  no murmur,   Respiratory:    clear b/l,    no wheezing  abd:     soft,   BS +,   no tenderness  :   no CVAT,  no suprapubic tenderness,   no  arellano  Musculoskeletal:   no joint swelling,   no edema, gouty changes in fingers, toes and elbows   vascular: + right chest wall permacath c/d/i , +PIV   Skin:    no rash, foot dressed   Neurologic:     no focal deficit  psych: normal affect    WBC Count: 6.59 K/uL (12-08 @ 06:20)  WBC Count: 9.15 K/uL (12-07 @ 07:50)  WBC Count: 6.97 K/uL (12-06 @ 07:59)  WBC Count: 6.98 K/uL (12-05 @ 07:00)  WBC Count: 5.17 K/uL (12-03 @ 02:50)  WBC Count: 5.22 K/uL (12-02 @ 19:54)                            12.1   6.59  )-----------( 286      ( 08 Dec 2022 06:20 )             38.3       12-08    136  |  98  |  53<H>  ----------------------------<  76  3.6   |  27  |  4.95<H>    Ca    9.5      08 Dec 2022 06:20            Creatinine Trend: 4.95<--, 5.74<--, 4.48<--, 5.58<--, 3.58<--, 3.40<--      MICROBIOLOGY:  v  .Abscess right foot  12-03-22   Numerous Proteus mirabilis  Few Staphylococcus epidermidis "Susceptibilities not performed"  Numerous Bacteroides fragilis "Susceptibilities not performed"  --  Proteus mirabilis      C-Reactive Protein, Serum: 66 (12-03)      SARS-CoV-2 Result: NotDetec (12-05-22 @ 15:00)    Surgical Pathology Report (12.06.22 @ 14:00)    Surgical Pathology Report:   ACCESSION No:  [id number]  Patient:   MARLA WELLS III      Accession:                             [id number]    Collected Date/Time:                   12/6/2022 14:00 EST  Received Date/Time:                    12/7/2022 11:12 EST    Surgical Pathology Report - Auth (Verified)    Specimen(s) Submitted  1  Bone , skin- gout right  4 th toe and metatarsal    Final Diagnosis  Fourth toe, right, resection:  - Skin and soft tissue : Ulcer, granulation tissue and gouty tophi  - Bone : Gouty tophi and chronic osteomyelitis, focal  - Bone resection margin : Negative for gouty tophi and osteomyelitis    Verified by: Raymond Branham MD  (Electronic Signature)  Reported on: 12/09/22 11:08 [place] [address]  _________________________________________________________________    Clinical Information    Right 4th toe osteomyelitis & gout    Gross Description  Received:  In formalin labeled  "bone, skin-gout right 4th toe and  metatarsal head"  Size:  One fragment measuring 5.0 x 2.0 x 3.5 cm (length, width and  thickness)  Description:  Specimen consists of an amputated toe including the nail.  There is a 0.6 x 0.5 cm, roughly circular opening/disrupted area in the  dorsal and one lateral aspect of the toe, revealing congested soft tissue  and white "chalky" material. Skin slippage noted on the plantar surface.  Soft tissue margins of resection are appear congested with focal "chalky"  material. Soft tissue and bone margins of resection inked blue. Also  received in the same container is a 2.0 x 1.3 x 0.8 cm piece of bone  consistent with a metatarsal "head" with congestion, and a 2.0 x 2.0 x  0.5 cm aggregate of "chalky" material  Submitted:  Representativesections following fixation and acid  decalcification in 8 cassettes:  1A: Soft tissue margin of resection, inked blue  1B-1C: Sections from toe  1D: Representative bone section from toe  1E: Representative bone section from toe including entire margin of  resection, inked blue  1F-1G: Entire bone section from metatarsal "head" including margin of  resection, inked black  1H: Separate aggregate of "chalky" material    Nilam Reyes 12/07/2022 04:21 PM      Disclaimer  In addition to other data that may appear on the specimen containers, all  labels have been inspected to confirm the presence of the patient's name  and date of birth.    Histological processing performed at [facility], [place] [address].          RADIOLOGY:

## 2022-12-09 NOTE — DIETITIAN INITIAL EVALUATION ADULT - NS FNS DIET ORDER
Diet, Regular:   For patients receiving Renal Replacement - No Protein Restr, No Conc K, No Conc Phos, Low Sodium (RENAL) (12-03-22 @ 00:58) [Active]

## 2022-12-09 NOTE — PROGRESS NOTE ADULT - SUBJECTIVE AND OBJECTIVE BOX
Patient is a 67y old  Male who presents with a chief complaint of WOUND INFECTION     (09 Dec 2022 12:16)       INTERVAL HPI/OVERNIGHT EVENTS:  Patient seen and evaluated at bedside.  Pt is resting comfortable in NAD. Denies N/V/F/C.    Allergies    No Known Allergies    Intolerances        Vital Signs Last 24 Hrs  T(C): 36.5 (09 Dec 2022 13:39), Max: 36.9 (09 Dec 2022 12:15)  T(F): 97.7 (09 Dec 2022 13:39), Max: 98.4 (09 Dec 2022 12:15)  HR: 76 (09 Dec 2022 13:39) (66 - 78)  BP: 129/80 (09 Dec 2022 13:39) (127/72 - 150/84)  BP(mean): --  RR: 17 (09 Dec 2022 13:39) (16 - 19)  SpO2: 98% (09 Dec 2022 13:39) (93% - 100%)    Parameters below as of 09 Dec 2022 12:15  Patient On (Oxygen Delivery Method): room air        LABS:                        12.1   6.59  )-----------( 286      ( 08 Dec 2022 06:20 )             38.3     12-08    136  |  98  |  53<H>  ----------------------------<  76  3.6   |  27  |  4.95<H>    Ca    9.5      08 Dec 2022 06:20          CAPILLARY BLOOD GLUCOSE          Lower Extremity Physical Exam:    Vascular: DP/PT 2/4, B/L, CFT <3 seconds B/L, Temperature gradient warm to cool B/L.   Neuro: Epicritic sensation intact to the level of forefoot B/L.  Musculoskeletal/Ortho: Progressive Rheumatoid arthritis B/L with ulnar deviation of the lesser metatarsals. HAV B/L.   Skin: s/p right foot partial fourth ray resection closed (DOS 12/6/22), sutures intact, steri strip intact distally, 1mm central dehiscence, remainder of the skin well coapted, no hematoma noted, scant sanguinous drainage expressed, no fluctuance, flaps warm and viable. Left foot no wounds, no clinical signs of infection.       RADIOLOGY & ADDITIONAL TESTS:

## 2022-12-09 NOTE — PROGRESS NOTE ADULT - ASSESSMENT
68 yo M with ESRD, RA and HTN, presenting due to nonhealing R 4th toe wound. He has joint issues in setting of RA and was previously treated for foot infection at wound care which healed 1 yr ago. this time infection has gotten worse over the past 3 weeks causing severe pain and prompting ED visit.   Sed rate 42.   xray: Right fourth digit with destruction of the PIP joint with dislocation, suspicious for septic arthritis/osteomyelitis.  patient clearly has osteomyelitis of his toe and MRI likely does not need to be done   discussed with patient the risks of not amputating including long term antibiotics, spreading infection and higher amputation   risks of surgery per podiatry   Vascular Arterial lower extremity bilateral 10.29.21 No evidence of arterial occlusion in the bilateral lower extremities.  Elevated velocities within the bilateral lower extremity arteries, as above, compatible with segmental stenoses.     12/6: amputation today, continue IV antibiotics, continue regular sessions of hemodialysis   12/8: s/p partial amputation of 4th toe right foot, no fevers, RA, no leukocytosis, pathology is pending, Zosyn IV continued.   Attending addendum:  agree with above  would wait for pathology   if negative will likely stop antibiotics or give short course of PO antibiotics for skin and soft tissue infection  if margins are positive then will arrange for antibiotics to be given 3 days a week at his hemodialysis sessions  12/9: pathology report reviewed, margin is clean, no fever, RA, no WBC, will stop iv abx and will start po Keflex - will need four more days to complete 7 days course post op.      Plan:  stop (Zosyn) Piperacillin/tazobactam 3.375 grams every 12 hours  start Keflex 500 mg po q 6 hrs - need four more days  follow all cultures   pathology - margin is clean  HD per renal    Discussed with Dr. West  Discussed with Dr. Calle  Discussed with the pt 66 yo M with ESRD, RA and HTN, presenting due to nonhealing R 4th toe wound. He has joint issues in setting of RA and was previously treated for foot infection at wound care which healed 1 yr ago. this time infection has gotten worse over the past 3 weeks causing severe pain and prompting ED visit.   Sed rate 42.   xray: Right fourth digit with destruction of the PIP joint with dislocation, suspicious for septic arthritis/osteomyelitis.  patient clearly has osteomyelitis of his toe and MRI likely does not need to be done   discussed with patient the risks of not amputating including long term antibiotics, spreading infection and higher amputation   risks of surgery per podiatry   Vascular Arterial lower extremity bilateral 10.29.21 No evidence of arterial occlusion in the bilateral lower extremities.  Elevated velocities within the bilateral lower extremity arteries, as above, compatible with segmental stenoses.     12/6: amputation today, continue IV antibiotics, continue regular sessions of hemodialysis   12/8: s/p partial amputation of 4th toe right foot, no fevers, RA, no leukocytosis, pathology is pending, Zosyn IV continued.   Attending addendum:  agree with above  would wait for pathology   if negative will likely stop antibiotics or give short course of PO antibiotics for skin and soft tissue infection  if margins are positive then will arrange for antibiotics to be given 3 days a week at his hemodialysis sessions  12/9: pathology report reviewed, margin is clean, no fever, RA, no WBC, will stop iv abx and will start po Augmentin - will need four more days to complete 7 days course post op.      Plan:  stop (Zosyn) Piperacillin/tazobactam 3.375 grams every 12 hours  start po Augmentin - need four more days  follow all cultures   pathology - margin is clean  HD per renal    Discussed with Dr. West  Discussed with Dr. Calle  Discussed with the pt

## 2022-12-09 NOTE — PROGRESS NOTE ADULT - ASSESSMENT
66 yo M with ESRD, RA and HTN, presenting due to nonhealing R 4th toe wound. He has joint issues in setting of RA and was previously treated for foot infection at wound care which healed 1 yr ago. this time infection has gotten worse over the past 3 weeks causing severe pain and prompting ED visit. Podiatry saw pt in ED and recommended vanco/zosyn for suspected osteomyelitis. vitals stable. labs significant for lactate 3.7 improving to 1.7 after IVF. Sed rate 42.     Acute right 4th toe osteomyelitis   sepsis -POA- now ruled out, vitals stable   -cont current antibiotics and pain control.  -s/p right foot partial fourth ray resection closed (12/6/22)  - ID following and will help with antibiotic course.   -Right foot wound growing Proteus mirabilis, Staph epidermidis, Bacteroides fragilis (final).  -Final Diagnosis  Fourth toe, right, resection:  - Skin and soft tissue : Ulcer, granulation tissue and gouty tophi  - Bone : Gouty tophi and chronic osteomyelitis, focal  - Bone resection margin : Negative for gouty tophi and osteomyelitis  Moderate protein-calorie malnutrition.- Diet Regular-For patients receiving Renal Replacement - No Protein Restr No Conc K No Conc Phos Low Sodium (RENAL)  RA  -on prednisone   HTN -controlled. cont current meds. Monitor.   ESRD -Continue hemodialysis and keep net negative fluid balance   Legally blind bilaterally.    dvtpp-heparin sc   Full code

## 2022-12-09 NOTE — DIETITIAN INITIAL EVALUATION ADULT - PERSON TAUGHT/METHOD
Reviewed renal diet; low Na, no conc phosphorus, no conc potassium, high protein while on HD/verbal instruction/patient instructed

## 2022-12-09 NOTE — DIETITIAN INITIAL EVALUATION ADULT - PERTINENT LABORATORY DATA
12-08    136  |  98  |  53<H>  ----------------------------<  76  3.6   |  27  |  4.95<H>    Ca    9.5      08 Dec 2022 06:20

## 2022-12-09 NOTE — DISCHARGE NOTE NURSING/CASE MANAGEMENT/SOCIAL WORK - PATIENT PORTAL LINK FT
You can access the FollowMyHealth Patient Portal offered by [facility] by registering at the following website: [email or web address]. By joining [facility]’s FollowMyHealth portal, you will also be able to view your health information using other applications (apps) compatible with our system.

## 2024-04-30 NOTE — ED ADULT NURSE NOTE - ED STAT RN HANDOFF DETAILS 2
From: Bev Hernandez  To: Leigh Ann Goldman  Sent: 4/29/2024 8:34 PM CDT  Subject: Dr Naga Bonilla,  I'm sorry I forgot to ask about another referral. I see Dr Nielson on Thursday. I think I still have a referral, but I'm not sure. Would you be able to check that for me? If I don't have a referral, could you put one in for me?  Thank you,  Bev Hernandez  
covering primary RN Chelle. Report given to receiving ed hold RN Kimberly, pts history, current condition and reason for admission discussed, safety concerns addressed and reviewed. Pt A&Ox3, resting in bed, appears in NAD, maintaining on room air, pt currently in stable condition, IV flushes for patency and site shows no signs or symptoms of infiltrate, fluids infusing, dressing is clean dry and intact, pt is aware of plan of care. Pt education deemed successful at time of report after patient demonstrates successful teach back for proficiency. Pending repeat lactate once fluids finish and bed assignment on admission unit.

## 2024-06-18 NOTE — ED ADULT NURSE NOTE - CHPI ED NUR TIMING2
Per Dr. Quintero patient to continue Zometa Q6 months based of Dexa scan results.     AN INFUSION: Zometa added to appointment on 06/25  
gradual onset

## (undated) DEVICE — PACK ORTHO

## (undated) DEVICE — TOURNIQUET ESMARK 4"

## (undated) DEVICE — GLV 6.5 PROTEXIS (WHITE)

## (undated) DEVICE — BLADE SURGICAL #15 CARBON

## (undated) DEVICE — NDL HYPO SAFE 18G X 1.5" (PINK)

## (undated) DEVICE — DRAPE 1/2 SHEET 40X57"

## (undated) DEVICE — VENODYNE/SCD SLEEVE CALF MEDIUM

## (undated) DEVICE — NDL HYPO SAFE 25G X 1.5" (ORANGE)

## (undated) DEVICE — VISITEC 4X4

## (undated) DEVICE — SAW BLADE MICROAIRE SAGITTAL 9.5MMX25.4MMX0.6MM

## (undated) DEVICE — FRA-ESU BOVIE FORCE TRIAD T7J19731DX: Type: DURABLE MEDICAL EQUIPMENT

## (undated) DEVICE — SUT VICRYL 3-0 27" SH UNDYED

## (undated) DEVICE — SOL IRR POUR NS 0.9% 1000ML

## (undated) DEVICE — SAW BLADE MICROAIRE SAGITTAL 5.5X25.5X0.4

## (undated) DEVICE — SYR LUER LOK 10CC

## (undated) DEVICE — DRAPE EXTREMITY 87" X 128.5"

## (undated) DEVICE — WARMING BLANKET UPPER ADULT

## (undated) DEVICE — SUT ETHILON 4-0 18" PS-2